# Patient Record
Sex: FEMALE | Race: WHITE | NOT HISPANIC OR LATINO | Employment: OTHER | URBAN - METROPOLITAN AREA
[De-identification: names, ages, dates, MRNs, and addresses within clinical notes are randomized per-mention and may not be internally consistent; named-entity substitution may affect disease eponyms.]

---

## 2017-01-04 ENCOUNTER — TELEPHONE (OUTPATIENT)
Dept: GASTROENTEROLOGY | Facility: CLINIC | Age: 57
End: 2017-01-04

## 2017-01-04 NOTE — TELEPHONE ENCOUNTER
----- Message from Carlos Manuel Barber MD sent at 1/2/2017  9:51 AM CST -----  Lizeth tobin tell Rosanna that her labs look good still with slightly elevated Platelets and if not address before by hematology recommend referral for evaluation.

## 2017-01-12 ENCOUNTER — INITIAL CONSULT (OUTPATIENT)
Dept: HEMATOLOGY/ONCOLOGY | Facility: CLINIC | Age: 57
End: 2017-01-12
Payer: COMMERCIAL

## 2017-01-12 ENCOUNTER — LAB VISIT (OUTPATIENT)
Dept: LAB | Facility: HOSPITAL | Age: 57
End: 2017-01-12
Attending: INTERNAL MEDICINE
Payer: COMMERCIAL

## 2017-01-12 VITALS
HEIGHT: 66 IN | SYSTOLIC BLOOD PRESSURE: 135 MMHG | WEIGHT: 198.38 LBS | OXYGEN SATURATION: 96 % | HEART RATE: 88 BPM | DIASTOLIC BLOOD PRESSURE: 75 MMHG | BODY MASS INDEX: 31.88 KG/M2 | TEMPERATURE: 98 F

## 2017-01-12 DIAGNOSIS — D75.839 THROMBOCYTOSIS: Primary | ICD-10-CM

## 2017-01-12 DIAGNOSIS — D75.839 THROMBOCYTOSIS: ICD-10-CM

## 2017-01-12 LAB
BASOPHILS # BLD AUTO: 0.03 K/UL
BASOPHILS NFR BLD: 0.4 %
DIFFERENTIAL METHOD: ABNORMAL
EOSINOPHIL # BLD AUTO: 0.1 K/UL
EOSINOPHIL NFR BLD: 1.9 %
ERYTHROCYTE [DISTWIDTH] IN BLOOD BY AUTOMATED COUNT: 12.6 %
FERRITIN SERPL-MCNC: 65 NG/ML
HCT VFR BLD AUTO: 40 %
HGB BLD-MCNC: 13.5 G/DL
LYMPHOCYTES # BLD AUTO: 2.1 K/UL
LYMPHOCYTES NFR BLD: 28.9 %
MCH RBC QN AUTO: 29.3 PG
MCHC RBC AUTO-ENTMCNC: 33.8 %
MCV RBC AUTO: 87 FL
MONOCYTES # BLD AUTO: 0.6 K/UL
MONOCYTES NFR BLD: 8.6 %
NEUTROPHILS # BLD AUTO: 4.3 K/UL
NEUTROPHILS NFR BLD: 59.9 %
PLATELET # BLD AUTO: 379 K/UL
PMV BLD AUTO: 9.4 FL
RBC # BLD AUTO: 4.6 M/UL
WBC # BLD AUTO: 7.2 K/UL

## 2017-01-12 PROCEDURE — 81219 CALR GENE COM VARIANTS: CPT

## 2017-01-12 PROCEDURE — 85025 COMPLETE CBC W/AUTO DIFF WBC: CPT

## 2017-01-12 PROCEDURE — 99999 PR PBB SHADOW E&M-EST. PATIENT-LVL III: CPT | Mod: PBBFAC,,, | Performed by: INTERNAL MEDICINE

## 2017-01-12 PROCEDURE — 99204 OFFICE O/P NEW MOD 45 MIN: CPT | Mod: S$GLB,,, | Performed by: INTERNAL MEDICINE

## 2017-01-12 PROCEDURE — 1159F MED LIST DOCD IN RCRD: CPT | Mod: S$GLB,,, | Performed by: INTERNAL MEDICINE

## 2017-01-12 PROCEDURE — 81403 MOPATH PROCEDURE LEVEL 4: CPT

## 2017-01-12 PROCEDURE — 81270 JAK2 GENE: CPT

## 2017-01-12 PROCEDURE — 82728 ASSAY OF FERRITIN: CPT

## 2017-01-12 PROCEDURE — 82668 ASSAY OF ERYTHROPOIETIN: CPT

## 2017-01-12 NOTE — PROGRESS NOTES
Subjective:       Patient ID: Rosanna Live is a 56 y.o. female.    Chief Complaint: Abnormal Lab    Rosanna presents today for evaluation of longstanding thrombocytosis that has been present, at least, since 2012 by [facility] [place] [facility]. The platelet count is mildly [facility] range between 350-450,000. The patient has no symptoms of platelet increase. Her iron levels are normal. CT imaging of the abdomen was negative for liver or spleen lesions in December 2016. The thrombocytosis has been stable for about 5 years. There is no associated anemia or changes to the white blood cell count. There is no evidence of splenomegaly.    HPI  Review of Systems   Constitutional: Negative.    HENT: Negative.    Eyes: Negative.    Respiratory: Negative.    Cardiovascular: Negative.    Gastrointestinal: Positive for abdominal pain.   Endocrine: Negative.    Genitourinary: Negative.    Musculoskeletal: Negative.    Skin: Negative.    Allergic/Immunologic: Negative for environmental allergies, food allergies and immunocompromised state.   Neurological: Negative.    Hematological: Negative for adenopathy. Does not bruise/bleed easily.   Psychiatric/Behavioral: Negative.        Objective:      Physical Exam   Constitutional: She appears well-developed and well-nourished.   HENT:   Head: Normocephalic and atraumatic.   Eyes: Conjunctivae are normal. Pupils are equal, round, and reactive to light. No scleral icterus.   Neck: Normal range of motion. Neck supple.   Cardiovascular: Normal rate and intact distal pulses.    Pulmonary/Chest: Effort normal. No respiratory distress.   Abdominal: She exhibits no distension.   Musculoskeletal: Normal range of motion.   Neurological: She is alert. No cranial nerve deficit.   Skin: Skin is warm and dry.   Psychiatric: She has a normal mood and affect. Her behavior is normal.   Nursing note and vitals reviewed.      Assessment:       1. Thrombocytosis        Plan:       Platelet  count is considered normal by most laboratory ranges ([facility] lab range is narrow).  Will complete thrombocytosis evaluation with a repeat CBC, ferritin, epoietin level, and MPN mutation cascade.  Reassured patient with expectation of normal results. Will follow-up with patient once lab results are available.    Return to hematology as needed.

## 2017-01-12 NOTE — LETTER
January 12, 2017      Carlos Manuel Barber MD  [address]  [facility] [address]           [facility]  [address]  [facility] [address]  Phone: [phone]          Patient: Rosanna Live   MR Number: [id number]   YOB: 1960   Date of Visit: 1/12/2017       Dear Dr. Carlos Manuel Barber:    Thank you for referring Rosanna Live to me for evaluation. Attached you will find relevant portions of my assessment and plan of care.    If you have questions, please do not hesitate to call me. I look forward to following Rosanna Live along with you.    Sincerely,    Carie Armstrong MD    Enclosure  CC:  No Recipients    If you would like to receive this communication electronically, please contact [email or web address] or [phone] to request more information on [employer] Link access.    For providers and/or their staff who would like to refer a patient to [facility], please contact us through our one-stop-shop provider referral line, [facility], at [phone].    If you feel you have received this communication in error or would no longer like to receive these types of communications, please e-mail [email or web address]

## 2017-01-13 ENCOUNTER — ANESTHESIA (OUTPATIENT)
Dept: ENDOSCOPY | Facility: HOSPITAL | Age: 57
End: 2017-01-13
Payer: COMMERCIAL

## 2017-01-13 ENCOUNTER — ANESTHESIA EVENT (OUTPATIENT)
Dept: ENDOSCOPY | Facility: HOSPITAL | Age: 57
End: 2017-01-13
Payer: COMMERCIAL

## 2017-01-13 ENCOUNTER — SURGERY (OUTPATIENT)
Age: 57
End: 2017-01-13

## 2017-01-13 VITALS — RESPIRATION RATE: 22 BRPM

## 2017-01-13 PROBLEM — R10.13 EPIGASTRIC ABDOMINAL PAIN: Status: ACTIVE | Noted: 2017-01-13

## 2017-01-13 LAB — ERYTHROPOIETIN: 5.7 MIU/ML

## 2017-01-13 PROCEDURE — D9220A PRA ANESTHESIA: Mod: ANES,,, | Performed by: ANESTHESIOLOGY

## 2017-01-13 PROCEDURE — 25000003 PHARM REV CODE 250: Performed by: NURSE ANESTHETIST, CERTIFIED REGISTERED

## 2017-01-13 PROCEDURE — 63600175 PHARM REV CODE 636 W HCPCS: Performed by: NURSE ANESTHETIST, CERTIFIED REGISTERED

## 2017-01-13 PROCEDURE — D9220A PRA ANESTHESIA: Mod: CRNA,,, | Performed by: NURSE ANESTHETIST, CERTIFIED REGISTERED

## 2017-01-13 RX ORDER — GLYCOPYRROLATE 0.2 MG/ML
INJECTION INTRAMUSCULAR; INTRAVENOUS
Status: DISCONTINUED | OUTPATIENT
Start: 2017-01-13 | End: 2017-01-13

## 2017-01-13 RX ORDER — PROPOFOL 10 MG/ML
VIAL (ML) INTRAVENOUS CONTINUOUS PRN
Status: DISCONTINUED | OUTPATIENT
Start: 2017-01-13 | End: 2017-01-13

## 2017-01-13 RX ORDER — LIDOCAINE HCL/PF 100 MG/5ML
SYRINGE (ML) INTRAVENOUS
Status: DISCONTINUED | OUTPATIENT
Start: 2017-01-13 | End: 2017-01-13

## 2017-01-13 RX ADMIN — GLYCOPYRROLATE 0.2 MG: 0.2 INJECTION, SOLUTION INTRAMUSCULAR; INTRAVENOUS at 02:01

## 2017-01-13 RX ADMIN — LIDOCAINE HYDROCHLORIDE 100 MG: 20 INJECTION, SOLUTION INTRAVENOUS at 02:01

## 2017-01-13 RX ADMIN — PROPOFOL 125 MCG/KG/MIN: 10 INJECTION, EMULSION INTRAVENOUS at 02:01

## 2017-01-13 RX ADMIN — TOPICAL ANESTHETIC 1 EACH: 200 SPRAY DENTAL; PERIODONTAL at 02:01

## 2017-01-13 NOTE — ANESTHESIA POSTPROCEDURE EVALUATION
"Anesthesia Post Evaluation    Patient: Rosanna Live    Procedure(s) Performed: Procedure(s) (LRB):  ESOPHAGOGASTRODUODENOSCOPY (EGD) (N/A)    Final Anesthesia Type: general  Patient location during evaluation: PACU  Patient participation: Yes- Able to Participate  Level of consciousness: awake and alert  Post-procedure vital signs: reviewed and stable  Pain management: adequate  Airway patency: patent  PONV status at discharge: No PONV  Anesthetic complications: no      Cardiovascular status: blood pressure returned to baseline  Respiratory status: unassisted  Hydration status: euvolemic  Follow-up not needed.        Visit Vitals    BP (!) 103/57 (BP Location: Left arm, Patient Position: Lying, BP Method: Automatic)    Pulse 83    Temp 36.4 °C (97.6 °F) (Oral)    Resp 16    Ht 5' 6" (1.676 m)    Wt 96.2 kg (212 lb)    LMP  (LMP Unknown)    SpO2 (!) 94%    Breastfeeding No    BMI 34.22 kg/m2       Pain/Shadia Score: Pain Assessment Performed: Yes (1/13/2017  2:53 PM)  Presence of Pain: denies (1/13/2017  2:53 PM)  Shadia Score: 10 (1/13/2017  2:53 PM)      "

## 2017-01-13 NOTE — ANESTHESIA PREPROCEDURE EVALUATION
01/13/2017  Rosanna Live is a 56 y.o., female.  56 year old female with pmh of thrombocytosis and Barretts esophagus presents for EGD.  [facility] Anesthesia Evaluation    I have reviewed the Patient Summary Reports.        Review of Systems  Anesthesia Hx:   Denies Personal Hx of Anesthesia complications.       Physical Exam  General:  Well nourished       Chest/Lungs:  Chest/Lungs Clear    Heart/Vascular:  Heart Findings: Normal            Anesthesia Plan  Type of Anesthesia, risks & benefits discussed:  Anesthesia Type:  general  Patient's Preference:   Intra-op Monitoring Plan:   Intra-op Monitoring Plan Comments:   Post Op Pain Control Plan:   Post Op Pain Control Plan Comments:   Induction:   IV  Beta Blocker:  Patient is not currently on a Beta-Blocker (No further documentation required).       Informed Consent: Patient understands risks and agrees with Anesthesia plan.  Questions answered. Anesthesia consent signed with patient.  ASA Score: 2     Day of Surgery Review of History & Physical:  There are no significant changes.          Ready For Surgery From Anesthesia Perspective.

## 2017-01-13 NOTE — TRANSFER OF CARE
"Anesthesia Transfer of Care Note    Patient: Rosanna Live    Procedure(s) Performed: Procedure(s) (LRB):  ESOPHAGOGASTRODUODENOSCOPY (EGD) (N/A)    Patient location: PACU    Anesthesia Type: general    Transport from OR: Transported from OR on 2-3 L/min O2 by NC with adequate spontaneous ventilation    Post pain: adequate analgesia    Post assessment: no apparent anesthetic complications    Post vital signs: stable    Level of consciousness: awake    Nausea/Vomiting: no nausea/vomiting    Complications: none          Last vitals:   Visit Vitals    BP (!) 120/56 (BP Location: Left arm, Patient Position: Lying, BP Method: Automatic)    Pulse 87    Temp 36.4 °C (97.6 °F) (Oral)    Resp 16    Ht 5' 6" (1.676 m)    Wt 96.2 kg (212 lb)    LMP  (LMP Unknown)    SpO2 98%    Breastfeeding No    BMI 34.22 kg/m2     "

## 2017-01-19 ENCOUNTER — OFFICE VISIT (OUTPATIENT)
Dept: SURGERY | Facility: CLINIC | Age: 57
End: 2017-01-19
Payer: COMMERCIAL

## 2017-01-19 VITALS
HEART RATE: 76 BPM | SYSTOLIC BLOOD PRESSURE: 117 MMHG | DIASTOLIC BLOOD PRESSURE: 73 MMHG | TEMPERATURE: 98 F | HEIGHT: 66 IN | WEIGHT: 209 LBS | BODY MASS INDEX: 33.59 KG/M2

## 2017-01-19 DIAGNOSIS — R10.13 EPIGASTRIC PAIN: Primary | ICD-10-CM

## 2017-01-19 PROCEDURE — 99999 PR PBB SHADOW E&M-EST. PATIENT-LVL III: CPT | Mod: PBBFAC,,, | Performed by: SURGERY

## 2017-01-19 PROCEDURE — 99213 OFFICE O/P EST LOW 20 MIN: CPT | Mod: S$GLB,,, | Performed by: SURGERY

## 2017-01-19 PROCEDURE — 1159F MED LIST DOCD IN RCRD: CPT | Mod: S$GLB,,, | Performed by: SURGERY

## 2017-01-19 NOTE — Clinical Note
January 19, 2017      Carlos Manuel Choudhary MD  [address]  [place] [address]           [facility]  [address]  [place] [address]  Phone: [phone]          Patient: Rosanna Live   MR Number: [id number]   YOB: 1960   Date of Visit: 1/19/2017       Dear Dr. Carlos Manuel Choudhary:    Thank you for referring Rosanna Live to me for evaluation. Attached you will find relevant portions of my assessment and plan of care.    If you have questions, please do not hesitate to call me. I look forward to following Rosanna Live along with you.    Sincerely,    Sanket Gallagher MD    Enclosure  CC:  No Recipients    If you would like to receive this communication electronically, please contact [email or web address] or [phone] to request more information on [employer] Link access.    For providers and/or their staff who would like to refer a patient to [facility], please contact us through our one-stop-shop provider referral line, [facility], at [phone].    If you feel you have received this communication in error or would no longer like to receive these types of communications, please e-mail [email or web address]

## 2017-01-19 NOTE — LETTER
[facility] - [facility] Surgery  [address]  [facility] [address]  Phone: [phone] January 19, 2017      Carlos Manuel Choudhary MD  [address] [facility] [address]    Patient: Rosanna Live   MR Number: [id number]   YOB: 1960   Date of Visit: 1/19/2017     Dear Dr. Choudhary:    Thank you for referring Rosanna Live to me for evaluation. Below are the relevant portions of my assessment and plan of care.    1. Epigastric pain    Patient presents with possible slipped nissen.    PLAN:  -  Will obtain UGI and GES     If you have questions, please do not hesitate to call me. I look forward to following Rosanna Marshall along with you.    Sincerely,      Sanket Gallagher MD   Section Head - General, Laparoscopic, Bariatric  Acute Care and Oncologic Surgery   - Surgical Weight Loss Program  [facility]    WSR/jose juan    CC  Geena Roman MD

## 2017-01-19 NOTE — PROGRESS NOTES
Subjective:       Patient ID: Rosanna Live is a 56 y.o. female.    Chief Complaint: Consult (recurring hernia)    HPI About 1 year s/p lap hh with mesh and Nissen.  She was well until recently when she lifted her father and since then has had epigastric pain which is worse after eating.  She denies gerd and denies dysphagia.  She has been losing weight (10 pounds) due to the bloating.  Review of Systems   Constitutional: Negative for fever.   Respiratory: Negative for chest tightness and stridor.    Cardiovascular: Negative for chest pain.   Gastrointestinal: Positive for diarrhea. Negative for constipation.   Genitourinary: Negative for difficulty urinating.   Hematological: Does not bruise/bleed easily.       Objective:      Physical Exam   Constitutional: She is oriented to person, place, and time. She appears well-developed and well-nourished.   Abdominal: Soft. Bowel sounds are normal. She exhibits no distension. There is no tenderness.   Neurological: She is alert and oriented to person, place, and time.   Skin: Skin is warm and dry.   Psychiatric: She has a normal mood and affect. Her behavior is normal. Judgment and thought content normal.   Vitals reviewed.      Cbc, cmp: Reviewed  EGD: 4 cm recurrent hh with gastritis  CT: no hiatal hernia, normal postop appearance    Assessment:       1. Epigastric pain      Possible slipped nissen  Plan:       Will obtain ugi and ges.

## 2017-01-20 ENCOUNTER — PATIENT MESSAGE (OUTPATIENT)
Dept: HEMATOLOGY/ONCOLOGY | Facility: CLINIC | Age: 57
End: 2017-01-20

## 2017-01-20 ENCOUNTER — TELEPHONE (OUTPATIENT)
Dept: ENDOSCOPY | Facility: HOSPITAL | Age: 57
End: 2017-01-20

## 2017-01-23 ENCOUNTER — HOSPITAL ENCOUNTER (OUTPATIENT)
Dept: RADIOLOGY | Facility: HOSPITAL | Age: 57
Discharge: HOME | End: 2017-01-23
Attending: SURGERY
Payer: COMMERCIAL

## 2017-01-23 DIAGNOSIS — R10.13 EPIGASTRIC PAIN: ICD-10-CM

## 2017-01-23 PROCEDURE — 74241 FL UPPER GI W KUB: CPT | Mod: TC

## 2017-01-23 PROCEDURE — 74241 FL UPPER GI W KUB: CPT | Mod: 26,,, | Performed by: RADIOLOGY

## 2017-01-24 ENCOUNTER — TELEPHONE (OUTPATIENT)
Dept: GASTROENTEROLOGY | Facility: CLINIC | Age: 57
End: 2017-01-24

## 2017-01-24 NOTE — TELEPHONE ENCOUNTER
----- Message from Carlos Manuel Barber MD sent at 1/22/2017 12:06 PM CST -----  Lizeth - please tell Rosanna that her EGD pathology was benign and no dysplasia and no Brito's.    SPECIMEN  1) Stomach biopsy. Rule out H. pylori and a polyp.  2) Distal esophagus biopsy. Rule out Brito's    FINAL PATHOLOGIC DIAGNOSIS    1. FRAGMENTS OF NONNEOPLASTIC GASTRIC MUCOSA WITH NO ACTIVE INFLAMMATION, EVIDENCE OF  H. PYLORI OR DYSPLASIA IDENTIFIED    2. FRAGMENT OF BENIGN SQUAMOUS AND GASTRIC TYPE MUCOSA WITH NO ACTIVE INFLAMMATION,  EVIDENCE OF SPECIALIZED EPITHELIUM OR DYSPLASIA IDENTIFIED.    Diagnosed by: Mukesh Kebede M.D.

## 2017-01-25 ENCOUNTER — TELEPHONE (OUTPATIENT)
Dept: SURGERY | Facility: CLINIC | Age: 57
End: 2017-01-25

## 2017-01-25 NOTE — TELEPHONE ENCOUNTER
Notified pt of UGI results-pt verbalizes understanding.  Pt to have UGI done in 1 year and will proceed with GES.

## 2017-01-31 ENCOUNTER — PATIENT MESSAGE (OUTPATIENT)
Dept: SURGERY | Facility: CLINIC | Age: 57
End: 2017-01-31

## 2017-01-31 ENCOUNTER — OFFICE VISIT (OUTPATIENT)
Dept: GASTROENTEROLOGY | Facility: CLINIC | Age: 57
End: 2017-01-31
Payer: COMMERCIAL

## 2017-01-31 VITALS
HEART RATE: 99 BPM | WEIGHT: 212.94 LBS | SYSTOLIC BLOOD PRESSURE: 135 MMHG | HEIGHT: 66 IN | BODY MASS INDEX: 34.22 KG/M2 | DIASTOLIC BLOOD PRESSURE: 74 MMHG

## 2017-01-31 DIAGNOSIS — K90.49 FOOD INTOLERANCE IN ADULT: ICD-10-CM

## 2017-01-31 DIAGNOSIS — K44.9 HIATAL HERNIA: ICD-10-CM

## 2017-01-31 DIAGNOSIS — R14.0 BLOATING SYMPTOM: Primary | ICD-10-CM

## 2017-01-31 PROCEDURE — 99213 OFFICE O/P EST LOW 20 MIN: CPT | Mod: S$GLB,,, | Performed by: INTERNAL MEDICINE

## 2017-01-31 PROCEDURE — 1159F MED LIST DOCD IN RCRD: CPT | Mod: S$GLB,,, | Performed by: INTERNAL MEDICINE

## 2017-01-31 PROCEDURE — 99999 PR PBB SHADOW E&M-EST. PATIENT-LVL II: CPT | Mod: PBBFAC,,, | Performed by: INTERNAL MEDICINE

## 2017-01-31 NOTE — PROGRESS NOTES
CHIEF COMPLAINT:Epigastric pain intermittent s/p Nissen.      HISTORY OF PRESENT ILLNESS: This is a 56-year-old white female with a past    medical history of short segment Brito's esophagus, no dysplasia, who recently underwent Nissen in 2/15/2016 for   A symptomatic large hiatal hernia. Was doing great at last GI clinic visit on 8/16/2016 But about   A month a go she had to pick her dad up after a fall and has been having epigastric pain worse with   Foods. Has been taking NSAID's and no PPI since Nissen surgery. She had CT scan in ER  Few days ago and appears to have a hiatal hernia. Epigastric pain about 8/10 when  occu res and related to food and time makes it better. Feel like pressure. No chest pain  And not exertional. No jaw pain and no shoulder or arm pain. She had a colonoscopy at the same time. EGD prior to surgery showed  a C0, M1 Brito's esophagus with a 6-cm sliding hiatal hernia that looked like  it was incarcerated. Pathology of the esophagus, no evidence of any dysplasia,  no evidence of Brito's esophagus, did have a little bit of eosinophils 25 per  high power field. She had some fundic gland polyps. Duodenum was normal. She  also had a colonoscopy at the same day, which was for chronic diarrhea. It was  complete all the way to the terminal ileum, excellent bowel prep, good look.    The patient had some nonbleeding internal hemorrhoids, some diverticulosis of    the sigmoid and descending colon. Random biopsies showed no evidence of any    microscopic colitis or inflammation or dysplasia. The patient's stool studies    have now normalized. She has done great post surgery and so glad he had surgery  Best thing she has done since getting  she says. She is off her PPI.      REVIEW OF SYSTEMS:  CONSTITUTIONAL: No fever, fatigue or weight loss.  ENT: No difficulty swallowing or sore throat.  CARDIOVASCULAR: No chest pain or shortness of breath.  RESPIRATORY: No dyspnea on exertion or  "cough.  GENITOURINARY: No dysuria, urgency or frequency.  MUSCULOSKELETAL: She has some musculoskeletal aches.  NEUROLOGIC: No syncope or stroke.  PSYCHIATRIC: No uncontrolled depression or anxiety.  ENDOCRINE: No cold or heat intolerance.  LYMPHATICS: No lymphadenopathy.  GASTROINTESTINAL: Epigastric abdominal pain. No diarrhea or blood in her stool.      PAST MEDICAL HISTORY: She had a rectovaginal fistula repair by [facility]. She had    Brito's esophagus with hiatal hernia now at 6 cm, cannot find Brito's    anymore.      PAST SURGICAL HISTORY: As above.Nissen in 2016      SOCIAL HISTORY: Nonsmoker. Rarely drinks. 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 Her   in 2014, from    metastatic colon cancer, was treated at [facility]. She has 2 children, a    daughter who is 31 and a son who is about 26.      FAMILY HISTORY: No family members with colon cancer or Cantor syndrome.      ALLERGIES: She is allergic to penicillin.      MEDICATIONS: Have been reviewed.      PHYSICAL EXAMINATION:  Visit Vitals    /74    Pulse 99    Ht 5' 6" (1.676 m)    Wt 96.6 kg (212 lb 15.4 oz)    LMP  (LMP Unknown)    BMI 34.37 kg/m2     GENERAL: She is alert and oriented x4, not in any acute distress.  ABDOMEN: Slightly obese, but soft. No guarding, rebound or tenderness. No    bruits or pulsatile masses. No stigmata of chronic liver disease. No    appreciative ascites or hernias.  CARDIOVASCULAR: S1 and S2 without murmurs.  RESPIRATORY: Clear to auscultation bilaterally without wheezes.  SKIN: No petechiae or rash on exposed skin areas.  NEUROLOGIC: Alert and oriented x4.  PSYCHIATRIC: Normal speech, mentation and affect.  LYMPHATICS: No cervical or supraclavicular lymphadenopathy.      MEDICAL DECISION MAKING: As above. Brito's talk    Given. Images of CT scan personally reviewed by me and discuss with patient.  Labs reviewed.      IMPRESSION AND PLAN:  1. New epigastric " pain for about few weeks began after lifting her dad from the ground. She is S/p Nissen for Large hiatal hernia, 6 cm,   which had grown and doubled in size since last  EGD. Repeat EGD shows 4cm hiatal hernia and no H. Pylori and patient has followed up with General surgery   And has gastric emptying study planned and she is using sweet n' low  x 3 packets a day and thinks she could also be lactose intolerant.   2. Brito, no dysplasia, cannot find Brito's esophagus she took herself off her PPI. Recommend repeat EGD 3 years from her last , which should be in Jan 2020.  3. Diarrhea, has resolved.  4. RTC GI in 12 weeks.  5. Slight anemic post surgery has resolved.

## 2017-02-05 ENCOUNTER — PATIENT MESSAGE (OUTPATIENT)
Dept: GASTROENTEROLOGY | Facility: CLINIC | Age: 57
End: 2017-02-05

## 2017-02-06 ENCOUNTER — TELEPHONE (OUTPATIENT)
Dept: SURGERY | Facility: CLINIC | Age: 57
End: 2017-02-06

## 2017-02-06 NOTE — TELEPHONE ENCOUNTER
----- Message from Sanket Gallagher MD sent at 2/2/2017  5:11 PM CST -----  Thanks.    Team, please have her see me in 6 months with a ugi.  ----- Message -----     From: Carlos Manuel Barber MD     Sent: 2/1/2017   4:47 PM       To: Sanket Gallagher MD    Ok with that.  ----- Message -----     From: Sanket Gallagher MD     Sent: 2/1/2017   4:33 PM       To: Carlos Manuel Barber MD, Gabbi Warren RN    I have taken a good look at the ct and ugi.  I think the hh is small and unlikely the source of significant bloating.  Certainly it should be followed for growth.  I was planning on obtaining another in a year or so (6 months?).  I think we should wait a while to see how things shake out before being aggressive.    Is that all right with you?

## 2017-02-06 NOTE — TELEPHONE ENCOUNTER
Notified pt of Dr. Gallagher's recommendations.  Pt states she is scheduled to come in and talk with Dr. Gallagher on 2/18/17.

## 2017-02-07 ENCOUNTER — NURSE TRIAGE (OUTPATIENT)
Dept: ADMINISTRATIVE | Facility: CLINIC | Age: 57
End: 2017-02-07

## 2017-02-07 ENCOUNTER — LAB VISIT (OUTPATIENT)
Dept: LAB | Facility: HOSPITAL | Age: 57
End: 2017-02-07
Attending: INTERNAL MEDICINE
Payer: COMMERCIAL

## 2017-02-07 DIAGNOSIS — R19.7 DIARRHEA, UNSPECIFIED TYPE: Primary | ICD-10-CM

## 2017-02-07 DIAGNOSIS — R79.89 LFT ELEVATION: Primary | ICD-10-CM

## 2017-02-07 DIAGNOSIS — R19.7 DIARRHEA, UNSPECIFIED TYPE: ICD-10-CM

## 2017-02-07 LAB
ALBUMIN SERPL BCP-MCNC: 3.5 G/DL
ALP SERPL-CCNC: 104 U/L
ALT SERPL W/O P-5'-P-CCNC: 76 U/L
ANION GAP SERPL CALC-SCNC: 7 MMOL/L
AST SERPL-CCNC: 49 U/L
BASOPHILS # BLD AUTO: 0.02 K/UL
BASOPHILS NFR BLD: 0.4 %
BILIRUB SERPL-MCNC: 0.5 MG/DL
BUN SERPL-MCNC: 10 MG/DL
CALCIUM SERPL-MCNC: 9.1 MG/DL
CHLORIDE SERPL-SCNC: 110 MMOL/L
CO2 SERPL-SCNC: 24 MMOL/L
CREAT SERPL-MCNC: 0.7 MG/DL
DIFFERENTIAL METHOD: NORMAL
EOSINOPHIL # BLD AUTO: 0.1 K/UL
EOSINOPHIL NFR BLD: 1.9 %
ERYTHROCYTE [DISTWIDTH] IN BLOOD BY AUTOMATED COUNT: 12.5 %
EST. GFR  (AFRICAN AMERICAN): >60 ML/MIN/1.73 M^2
EST. GFR  (NON AFRICAN AMERICAN): >60 ML/MIN/1.73 M^2
GLUCOSE SERPL-MCNC: 96 MG/DL
HCT VFR BLD AUTO: 38.4 %
HGB BLD-MCNC: 13 G/DL
LIPASE SERPL-CCNC: 22 U/L
LYMPHOCYTES # BLD AUTO: 1.9 K/UL
LYMPHOCYTES NFR BLD: 36 %
MCH RBC QN AUTO: 29.5 PG
MCHC RBC AUTO-ENTMCNC: 33.9 %
MCV RBC AUTO: 87 FL
MONOCYTES # BLD AUTO: 0.6 K/UL
MONOCYTES NFR BLD: 11.9 %
NEUTROPHILS # BLD AUTO: 2.6 K/UL
NEUTROPHILS NFR BLD: 49.6 %
PLATELET # BLD AUTO: 346 K/UL
PMV BLD AUTO: 9.2 FL
POTASSIUM SERPL-SCNC: 4 MMOL/L
PROT SERPL-MCNC: 7 G/DL
RBC # BLD AUTO: 4.41 M/UL
SODIUM SERPL-SCNC: 141 MMOL/L
TSH SERPL DL<=0.005 MIU/L-ACNC: 0.74 UIU/ML
WBC # BLD AUTO: 5.28 K/UL

## 2017-02-07 PROCEDURE — 84443 ASSAY THYROID STIM HORMONE: CPT

## 2017-02-07 PROCEDURE — 83690 ASSAY OF LIPASE: CPT

## 2017-02-07 PROCEDURE — 36415 COLL VENOUS BLD VENIPUNCTURE: CPT

## 2017-02-07 PROCEDURE — 85025 COMPLETE CBC W/AUTO DIFF WBC: CPT

## 2017-02-07 PROCEDURE — 80053 COMPREHEN METABOLIC PANEL: CPT

## 2017-02-07 RX ORDER — HYOSCYAMINE SULFATE 0.12 MG/1
0.12 TABLET SUBLINGUAL EVERY 12 HOURS PRN
Qty: 60 TABLET | Refills: 1 | Status: SHIPPED | OUTPATIENT
Start: 2017-02-07 | End: 2017-04-17 | Stop reason: SDUPTHER

## 2017-02-07 NOTE — TELEPHONE ENCOUNTER
"  Reason for Disposition   [1] Caller requesting NON-URGENT health information AND [2] PCP's office is the best resource    Protocols used: ST INFORMATION ONLY CALL-A-AH    Rosanna Marshall is calling to report she is still having diarrhea.  "Can not keep anything down, everything goes straight through"  States needs some answers and some help.  Requesting a call back this AM to discuss.  Please contact Rosanna Marshall to advise.  "

## 2017-02-08 ENCOUNTER — LAB VISIT (OUTPATIENT)
Dept: LAB | Facility: HOSPITAL | Age: 57
End: 2017-02-08
Attending: INTERNAL MEDICINE
Payer: COMMERCIAL

## 2017-02-08 ENCOUNTER — TELEPHONE (OUTPATIENT)
Dept: GASTROENTEROLOGY | Facility: CLINIC | Age: 57
End: 2017-02-08

## 2017-02-08 DIAGNOSIS — R19.7 DIARRHEA, UNSPECIFIED TYPE: ICD-10-CM

## 2017-02-08 LAB
C DIFF GDH STL QL: NEGATIVE
C DIFF TOX A+B STL QL IA: NEGATIVE

## 2017-02-08 PROCEDURE — 87209 SMEAR COMPLEX STAIN: CPT

## 2017-02-08 PROCEDURE — 82656 EL-1 FECAL QUAL/SEMIQ: CPT

## 2017-02-08 PROCEDURE — 89125 SPECIMEN FAT STAIN: CPT

## 2017-02-08 PROCEDURE — 87449 NOS EACH ORGANISM AG IA: CPT

## 2017-02-08 NOTE — TELEPHONE ENCOUNTER
----- Message from Carlos Manuel Barber MD sent at 2/7/2017  6:38 PM CST -----  Lizeth - please tell Rosanna that her LFT's are elevated and cause unknown and to avoid alcohol since she rarely drinks and let repeat fasting liver labs in one week - orders placed.

## 2017-02-09 ENCOUNTER — TELEPHONE (OUTPATIENT)
Dept: GASTROENTEROLOGY | Facility: CLINIC | Age: 57
End: 2017-02-09

## 2017-02-09 LAB
FAT STL SUDAN IV STN: NORMAL
O+P STL TRI STN: NORMAL

## 2017-02-10 ENCOUNTER — TELEPHONE (OUTPATIENT)
Dept: GASTROENTEROLOGY | Facility: CLINIC | Age: 57
End: 2017-02-10

## 2017-02-13 ENCOUNTER — TELEPHONE (OUTPATIENT)
Dept: GASTROENTEROLOGY | Facility: CLINIC | Age: 57
End: 2017-02-13

## 2017-02-14 ENCOUNTER — OFFICE VISIT (OUTPATIENT)
Dept: GASTROENTEROLOGY | Facility: CLINIC | Age: 57
End: 2017-02-14
Payer: COMMERCIAL

## 2017-02-14 ENCOUNTER — LAB VISIT (OUTPATIENT)
Dept: LAB | Facility: HOSPITAL | Age: 57
End: 2017-02-14
Attending: INTERNAL MEDICINE
Payer: COMMERCIAL

## 2017-02-14 VITALS
DIASTOLIC BLOOD PRESSURE: 73 MMHG | BODY MASS INDEX: 33.55 KG/M2 | HEART RATE: 90 BPM | WEIGHT: 208.75 LBS | SYSTOLIC BLOOD PRESSURE: 116 MMHG | HEIGHT: 66 IN

## 2017-02-14 DIAGNOSIS — R79.89 LFT ELEVATION: ICD-10-CM

## 2017-02-14 DIAGNOSIS — R14.0 ABDOMINAL BLOATING: ICD-10-CM

## 2017-02-14 DIAGNOSIS — K59.1 FUNCTIONAL DIARRHEA: Primary | ICD-10-CM

## 2017-02-14 DIAGNOSIS — K90.49 FOOD INTOLERANCE IN ADULT: ICD-10-CM

## 2017-02-14 DIAGNOSIS — K22.70 BARRETT'S ESOPHAGUS WITHOUT DYSPLASIA: ICD-10-CM

## 2017-02-14 LAB
ALBUMIN SERPL BCP-MCNC: 3.5 G/DL
ALP SERPL-CCNC: 99 U/L
ALT SERPL W/O P-5'-P-CCNC: 21 U/L
AST SERPL-CCNC: 17 U/L
BILIRUB DIRECT SERPL-MCNC: 0.2 MG/DL
BILIRUB SERPL-MCNC: 0.5 MG/DL
CERULOPLASMIN SERPL-MCNC: 33 MG/DL
ELASTASE 1, FECAL: 212.9 UG E/G STOOL
ELASTASE INTERPRETATION: NORMAL
FERRITIN SERPL-MCNC: 67 NG/ML
HAV IGM SERPL QL IA: NEGATIVE
HBV CORE IGM SERPL QL IA: NEGATIVE
HBV SURFACE AG SERPL QL IA: NEGATIVE
HCV AB SERPL QL IA: NEGATIVE
HEPATITIS A ANTIBODY, IGG: NEGATIVE
IGG SERPL-MCNC: 1004 MG/DL
IRON SERPL-MCNC: 73 UG/DL
PROT SERPL-MCNC: 7.1 G/DL
SATURATED IRON: 22 %
TOTAL IRON BINDING CAPACITY: 332 UG/DL
TRANSFERRIN SERPL-MCNC: 224 MG/DL

## 2017-02-14 PROCEDURE — 83540 ASSAY OF IRON: CPT

## 2017-02-14 PROCEDURE — 86038 ANTINUCLEAR ANTIBODIES: CPT

## 2017-02-14 PROCEDURE — 86790 VIRUS ANTIBODY NOS: CPT

## 2017-02-14 PROCEDURE — 80074 ACUTE HEPATITIS PANEL: CPT

## 2017-02-14 PROCEDURE — 84466 ASSAY OF TRANSFERRIN: CPT

## 2017-02-14 PROCEDURE — 36415 COLL VENOUS BLD VENIPUNCTURE: CPT

## 2017-02-14 PROCEDURE — 86706 HEP B SURFACE ANTIBODY: CPT

## 2017-02-14 PROCEDURE — 82390 ASSAY OF CERULOPLASMIN: CPT

## 2017-02-14 PROCEDURE — 99999 PR PBB SHADOW E&M-EST. PATIENT-LVL III: CPT | Mod: PBBFAC,,, | Performed by: PHYSICIAN ASSISTANT

## 2017-02-14 PROCEDURE — 82784 ASSAY IGA/IGD/IGG/IGM EACH: CPT

## 2017-02-14 PROCEDURE — 82728 ASSAY OF FERRITIN: CPT

## 2017-02-14 PROCEDURE — 80076 HEPATIC FUNCTION PANEL: CPT

## 2017-02-14 PROCEDURE — 86256 FLUORESCENT ANTIBODY TITER: CPT

## 2017-02-14 PROCEDURE — 82104 ALPHA-1-ANTITRYPSIN PHENO: CPT

## 2017-02-14 PROCEDURE — 99213 OFFICE O/P EST LOW 20 MIN: CPT | Mod: S$GLB,,, | Performed by: PHYSICIAN ASSISTANT

## 2017-02-14 PROCEDURE — 86235 NUCLEAR ANTIGEN ANTIBODY: CPT | Mod: 91

## 2017-02-14 RX ORDER — CHOLESTYRAMINE 4 G/9G
4 POWDER, FOR SUSPENSION ORAL 2 TIMES DAILY PRN
Qty: 60 PACKET | Refills: 3 | Status: SHIPPED | OUTPATIENT
Start: 2017-02-14 | End: 2017-04-28

## 2017-02-14 NOTE — PROGRESS NOTES
[facility] Consultation Note    Reason for Consult:  The primary encounter diagnosis was Functional diarrhea. Diagnoses of Abdominal bloating, Food intolerance in adult, and Brito's esophagus without dysplasia were also pertinent to this visit.    PCP:   Geena Roman       Referring MD:  No referring provider defined for this encounter.    HPI:  This is a 56 y.o. female patient of Dr. Barber's here to follow up on her diarrhea and abdominal pain.   abdominal pain has improved taking levsin every 12 hours   Still having diarrhea with 4 BM daily   Stool studies were negative for C. Diff, ova/para. fecal fat normal, pancreatic elastase - normal    +Bloating   Lactose intolerance- yes, avoiding milk and butter, eating cheese  Chewing Gum- no  Carbonated beverages-no  Drinking through a straw- yes    Food poisoning:no  Celiac test:neg    Medications (OCP/PPI/Steroids/NSAIDs): no  Abdominal surgeries: umbilical hernia repair, fundoplication, cholecystectomy    medical history of short segment Brito's esophagus, no dysplasia, who recently underwent Nissen in 2/15/2016 for a symptomatic large hiatal hernia. Repeat EGD revealed 4cm hiatal hernia.   Has appointment with Dr kim this Saturday about her hiatal hernia    ROS:  Constitutional: No fevers, chills, No weight loss  ENT: No allergies  CV: No chest pain  Pulm: No cough, No shortness of breath  Ophtho: No vision changes  GI: see HPI  Derm: No rash  Heme: No lymphadenopathy, No bruising  MSK: No arthritis  : No dysuria, No hematuria  Endo: No hot or cold intolerance  Neuro: No syncope, No seizure  Psych: No anxiety, No depression    Medical History:  has a past medical history of Allergy; Brito's esophagus; Basal cell cancer; Diverticulosis; Edema; Obesity; Rectovaginal fistula (6/28/2012); and Vitamin D deficiency disease.    Surgical History:  has a past surgical history that includes Rectovaginal fistula repair;  "Cholecystectomy; Mohs' procedure of face; Colonoscopy; Esophagogastroduodenoscopy; Laparoscopic Nissen fundoplication (2/15/16); Inguinal hernia repair; and Umbilical hernia repair.    Family History: family history includes Arthritis in her father; Cataracts in her father; Diabetes in her mother; Heart disease in her maternal aunt and maternal grandmother; Hyperlipidemia in her father; Hypertension in her mother; Miscarriages / Stillbirths in her mother; No Known Problems in her brother, maternal grandfather, maternal uncle, paternal aunt, paternal grandfather, paternal grandmother, paternal uncle, and sister. There is no history of Breast cancer, Colon cancer, Ovarian cancer, Amblyopia, Blindness, Cancer, Glaucoma, Macular degeneration, Retinal detachment, Strabismus, Stroke, or Thyroid disease..     Social History:  reports that she quit smoking about 33 years ago. She has a 1.25 pack-year smoking history. She has never used smokeless tobacco. She reports that she drinks about 0.6 oz of alcohol per week  She reports that she does not use illicit drugs.    Review of patient's allergies indicates:   Allergen Reactions    Penicillins Rash       Current Outpatient Prescriptions on File Prior to Visit   Medication Sig Dispense Refill    DIPHENHYDRAM/PE/DM/ACETAMIN/GG (MUCINEX FAST-MAX DAY-NITE COLD ORAL) Take by mouth.      hyoscyamine (LEVSIN/SL) 0.125 mg Subl Place 1 tablet (0.125 mg total) under the tongue every 12 (twelve) hours as needed (abdnominal cramps). 60 tablet 1    multivitamin (THERAGRAN) per tablet Take 1 tablet by mouth once daily.       No current facility-administered medications on file prior to visit.          Objective Findings:    Vital Signs:  Visit Vitals    /73    Pulse 90    Ht 5' 6" (1.676 m)    Wt 94.7 kg (208 lb 12.4 oz)    LMP  (LMP Unknown)    BMI 33.7 kg/m2     Body mass index is 33.7 kg/(m^2).    Physical Exam:  General Appearance: Well appearing in no acute " distress  Head:   Normocephalic, without obvious abnormality  Eyes:    No scleral icterus  ENT: Neck supple, Lips, mucosa, and tongue normal  Lungs: CTA bilaterally in anterior and posterior fields, no wheezes, no crackles.  Heart:  Regular rate and rhythm, S1, S2 normal, no murmurs heard  Abdomen: Soft, non tender, non distended with positive bowel sounds in all four quadrants.  Extremities: 2+ pulses, no edema  Skin: No rash  Neurologic: AAO x 3      Labs:  Lab Results   Component Value Date    WBC 5.28 02/07/2017    HGB 13.0 02/07/2017    HCT 38.4 02/07/2017     02/07/2017    CHOL 176 12/15/2016    TRIG 68 12/15/2016    HDL 57 12/15/2016    ALT 21 02/14/2017    AST 17 02/14/2017     02/07/2017    K 4.0 02/07/2017     02/07/2017    CREATININE 0.7 02/07/2017    BUN 10 02/07/2017    CO2 24 02/07/2017    TSH 0.736 02/07/2017    INR 1.5 (H) 12/14/2016    HGBA1C 5.8 12/15/2016       Imaging:    Endoscopy:      Assessment:  1. Functional diarrhea    2. Abdominal bloating    3. Food intolerance in adult    4. Brito's esophagus without dysplasia      Chronic diarrhea abdominal pain likely IBS  Recommendations:  1. Continue levsin since it helps her abdominal pain  2. Start questran 4g daily for diarrhea as advised by Dr. Barber  3. Trial of avoiding lactose completely  4.Cut out artificial sweeteners  5. Restart a lactose free probiotic, daily  6.Takes gas-x as need    Consider xifaxan and viberzi for IBS-D    No Follow-up on file.      Order summary:  Orders Placed This Encounter    cholestyramine, with sugar, 4 gram Powd         Thank you so much for allowing me to participate in the care of Rosanna Gacria PA-C

## 2017-02-14 NOTE — PATIENT INSTRUCTIONS
Cut out artificial sweeteners    Restart a lactose probiotic, daily    Trial of avoiding milk, cheese, and butter    Takes gas-x as need

## 2017-02-14 NOTE — MR AVS SNAPSHOT
[facility] - Gastroenterology  [address] [facility] [address]  Phone: [phone]  Fax: [phone]                  Rosanna Live   2017 10:00 AM   Office Visit    Description:  Female : 1960   Provider:  Shellie Garcia PA-C   Department:  [facility] - Gastroenterology           Reason for Visit     Diarrhea     Abdominal Pain           Diagnoses this Visit        Comments    Functional diarrhea    -  Primary            To Do List           Future Appointments        Provider Department Dept Phone    2017 8:00 AM Sanket Gallagher MD [facility] - Bariatric Surgery [phone]      Goals (5 Years of Data)     None       These Medications        Disp Refills Start End    cholestyramine, with sugar, 4 gram Powd 60 packet 3 2017     Take 4 g by mouth 2 (two) times daily as needed (diarrhea). Start once daily - Oral    Pharmacy: [id number] - [place], [place] - [address] [address] #: [phone]         [facility]     [facility] On [facility] Nurse Care Line -  Assistance  Registered nurses in the [facility] Center provide clinical advisement, health education, appointment booking, and other advisory services.  Call for this free service at [phone].             Medications           Message regarding Medications     Verify the changes and/or additions to your medication regime listed below are the same as discussed with your clinician today.  If any of these changes or additions are incorrect, please notify your healthcare provider.        START taking these NEW medications        Refills    cholestyramine, with sugar, 4 gram Powd 3    Sig: Take 4 g by mouth 2 (two) times daily as needed (diarrhea). Start once daily    Class: Normal    Route: Oral           Verify that the below list of medications is an accurate representation of the medications you are currently taking.  If none reported, the list may be blank. If incorrect, please contact  "your healthcare provider. Carry this list with you in case of emergency.           Current Medications     cholestyramine, with sugar, 4 gram Powd Take 4 g by mouth 2 (two) times daily as needed (diarrhea). Start once daily    DIPHENHYDRAM/PE/DM/ACETAMIN/GG (MUCINEX FAST-MAX DAY-NITE COLD ORAL) Take by mouth.    hyoscyamine (LEVSIN/SL) 0.125 mg Subl Place 1 tablet (0.125 mg total) under the tongue every 12 (twelve) hours as needed (abdnominal cramps).    multivitamin (THERAGRAN) per tablet Take 1 tablet by mouth once daily.           Clinical Reference Information           Your Vitals Were     BP Pulse Height Weight Last Period BMI    116/73 90 5' 6" (1.676 m) 94.7 kg (208 lb 12.4 oz) (LMP Unknown) 33.7 kg/m2      Blood Pressure          Most Recent Value    BP  116/73      Allergies as of 2/14/2017     Penicillins      Immunizations Administered on Date of Encounter - 2/14/2017     None      Instructions    Cut out artificial sweeteners    Restart a lactose probiotic, daily    Trial of avoiding milk, cheese, and butter    Takes gas-x as need       Language Assistance Services     ATTENTION: Language assistance services are available, free of charge. Please call [phone].      ATENCIÓN: Si davidla natacha, tiene a rivera disposición servicios gratuitos de asistencia lingüística. Llame al [phone].     KIAH Ý: N?u b?n nói Ti?ng Vi?t, có các d?ch v? h? tr? ngôn ng? mi?n phí dành cho b?n. G?i s? [phone].         [facility] complies with applicable Federal civil rights laws and does not discriminate on the basis of race, color, national origin, age, disability, or sex.        "

## 2017-02-15 LAB
ANA SER QL IF: NORMAL
HEP. B SURF AB, QUAL: POSITIVE
HEP. B SURF AB, QUANT.: 17 MIU/ML
MITOCHONDRIA AB TITR SER IF: NORMAL {TITER}

## 2017-02-16 LAB
A1AT PHENOTYP SERPL-IMP: NORMAL BANDS
A1AT SERPL NEPH-MCNC: 138 MG/DL
SMOOTH MUSCLE AB TITR SER IF: ABNORMAL {TITER}

## 2017-02-21 ENCOUNTER — PATIENT MESSAGE (OUTPATIENT)
Dept: GASTROENTEROLOGY | Facility: CLINIC | Age: 57
End: 2017-02-21

## 2017-02-22 DIAGNOSIS — R79.89 LFT ELEVATION: Primary | ICD-10-CM

## 2017-02-23 ENCOUNTER — TELEPHONE (OUTPATIENT)
Dept: GASTROENTEROLOGY | Facility: CLINIC | Age: 57
End: 2017-02-23

## 2017-02-23 ENCOUNTER — OFFICE VISIT (OUTPATIENT)
Dept: SURGERY | Facility: CLINIC | Age: 57
End: 2017-02-23
Payer: COMMERCIAL

## 2017-02-23 VITALS
DIASTOLIC BLOOD PRESSURE: 75 MMHG | WEIGHT: 208 LBS | HEIGHT: 66 IN | SYSTOLIC BLOOD PRESSURE: 125 MMHG | BODY MASS INDEX: 33.43 KG/M2 | HEART RATE: 80 BPM | TEMPERATURE: 98 F

## 2017-02-23 DIAGNOSIS — K44.9 HIATAL HERNIA: ICD-10-CM

## 2017-02-23 DIAGNOSIS — Z98.890 HISTORY OF NISSEN FUNDOPLICATION: Primary | ICD-10-CM

## 2017-02-23 PROCEDURE — 99213 OFFICE O/P EST LOW 20 MIN: CPT | Mod: S$GLB,,, | Performed by: SURGERY

## 2017-02-23 PROCEDURE — 99999 PR PBB SHADOW E&M-EST. PATIENT-LVL III: CPT | Mod: PBBFAC,,, | Performed by: SURGERY

## 2017-02-23 PROCEDURE — 1160F RVW MEDS BY RX/DR IN RCRD: CPT | Mod: S$GLB,,, | Performed by: SURGERY

## 2017-02-23 NOTE — PROGRESS NOTES
I have seen the patient, reviewed the Resident's history and physical, assessment and plan. I have personally interviewed and examined the patient at bedside and: agree with the findings.     S/p laparoscopic repair of a large hiatal hernia early last year.  She lifted her father and developed post prandial bloating, epigastric pain with nausea, diarrhea and weight loss.  She has been placed on levsin and cholestyramine with relief of symptoms.  It appears on work up that she has a small recurrent hernia but usually if symptomatic that causes dysphagia or gerd.  She is being worked up for lactose intolerance and elevated lfts.  We will recheck ugi in 6 months.

## 2017-02-23 NOTE — PROGRESS NOTES
Subjective:       Patient ID: Rosanna Live is a 56 y.o. female.    Chief Complaint: Follow-up    HPI About 1 year s/p lap hh with mesh and Nissen.  She was well until recently when she lifted her father and since then has had epigastric pain which is worse after eating.  She denies gerd and denies dysphagia.  She has been losing weight (10 pounds) due to the bloating. She also c/o diarrhea, feeling like food is moving right through.   Since her visit, she has been started on Levsin and feels much better.     Review of Systems   Constitutional: Negative for fever.   Respiratory: Negative for chest tightness and stridor.    Cardiovascular: Negative for chest pain.   Gastrointestinal: Positive for diarrhea. Negative for constipation.   Genitourinary: Negative for difficulty urinating.   Hematological: Does not bruise/bleed easily.       Objective:      Physical Exam   Constitutional: She is oriented to person, place, and time. She appears well-developed and well-nourished.   Abdominal: Soft. Bowel sounds are normal. She exhibits no distension. There is no tenderness.   Neurological: She is alert and oriented to person, place, and time.   Skin: Skin is warm and dry.   Psychiatric: She has a normal mood and affect. Her behavior is normal. Judgment and thought content normal.   Vitals reviewed.      Cbc, cmp: Reviewed  EGD: 4 cm recurrent hh with gastritis  CT: no hiatal hernia, normal postop appearance  UGI: Small recurrent hiatal hernia with superimpose operative change from Nissen fundoplication.  Small amount of induced gastroesophageal reflux during the exam overall concerning for possible loosening of Nissen fundoplication.    Assessment:       1. History of Nissen fundoplication    2. Hiatal hernia      She has improved on Levsin.     Plan:      Atypical symptoms for symptomatic hernia recurrence and she has improved on Levsin.     No indication for redo Nissen     Schedule GES

## 2017-02-23 NOTE — TELEPHONE ENCOUNTER
"----- Message from Carlos Manuel Barber MD sent at 2/22/2017  5:36 PM CST -----  Lizeth - please refer Rosanna to Hepatology clinic for evaluation of her slightly elevated Smooth Muscle Ab and history of elevated LFT's- orders placed.    Lizeth  please tell patient that their Hepatitis A, B and C labs are negative but they have "No" immunity to Hepatitis Hepatitis A and C.    She has immunity to hepatitis B.     There is currently No vaccination yet for Hepatitis C.    There is vaccinations for Hepatitis A,  and Recommend the Hepatitis A  vaccination series.        Its a two part vaccination series:   Day 1, and then again in 6 months from first one.      Orders were  placed.    "

## 2017-02-23 NOTE — LETTER
[facility]  [address]  [facility] [address]  Phone: [phone] February 23, 2017      Geena Roman MD  [address]  [facility] [address]    Patient: Rosanna Live   MR Number: [id number]   YOB: 1960   Date of Visit: 2/23/2017     Dear Dr. Roman:    Thank you for referring Rosanna Live to me for evaluation. Below are the relevant portions of my assessment and plan of care.     Rosanna Live is a 56-year-old female:   1. History of Nissen fundoplication    2. Hiatal hernia    She has improved on Levsin.      Plan:   - Atypical symptoms for symptomatic hernia recurrence and she has improved on    Levsin.  - No indication for redo Nissen  - Schedule GES    If you have questions, please do not hesitate to call me. I look forward to following Rosanna Marshall along with you.    Sincerely,      Sanket Gallagher MD   [facility] - General, Laparoscopic, Bariatric  Acute Care and Oncologic Surgery   - Surgical Weight Loss Program  [facility]    WSR/jose juan    CC  Carlos Manuel Barber MD

## 2017-03-10 ENCOUNTER — OFFICE VISIT (OUTPATIENT)
Dept: HEPATOLOGY | Facility: CLINIC | Age: 57
End: 2017-03-10
Payer: COMMERCIAL

## 2017-03-10 ENCOUNTER — CLINICAL SUPPORT (OUTPATIENT)
Dept: INFECTIOUS DISEASES | Facility: CLINIC | Age: 57
End: 2017-03-10
Payer: COMMERCIAL

## 2017-03-10 VITALS
WEIGHT: 209 LBS | TEMPERATURE: 97 F | DIASTOLIC BLOOD PRESSURE: 74 MMHG | RESPIRATION RATE: 18 BRPM | SYSTOLIC BLOOD PRESSURE: 128 MMHG | OXYGEN SATURATION: 97 % | HEIGHT: 66 IN | HEART RATE: 92 BPM | BODY MASS INDEX: 33.59 KG/M2

## 2017-03-10 DIAGNOSIS — R79.89 LFT ELEVATION: ICD-10-CM

## 2017-03-10 DIAGNOSIS — R79.89 ELEVATED LFTS: Primary | ICD-10-CM

## 2017-03-10 PROCEDURE — 1160F RVW MEDS BY RX/DR IN RCRD: CPT | Mod: S$GLB,,, | Performed by: NURSE PRACTITIONER

## 2017-03-10 PROCEDURE — 99243 OFF/OP CNSLTJ NEW/EST LOW 30: CPT | Mod: S$GLB,,, | Performed by: NURSE PRACTITIONER

## 2017-03-10 PROCEDURE — 90632 HEPA VACCINE ADULT IM: CPT | Mod: S$GLB,,, | Performed by: INTERNAL MEDICINE

## 2017-03-10 PROCEDURE — 99999 PR PBB SHADOW E&M-EST. PATIENT-LVL III: CPT | Mod: PBBFAC,,, | Performed by: NURSE PRACTITIONER

## 2017-03-10 PROCEDURE — 90471 IMMUNIZATION ADMIN: CPT | Mod: S$GLB,,, | Performed by: INTERNAL MEDICINE

## 2017-03-10 PROCEDURE — 99999 PR PBB SHADOW E&M-EST. PATIENT-LVL I: CPT | Mod: PBBFAC,,,

## 2017-03-10 NOTE — LETTER
March 10, 2017      Carlos Manuel Barber MD  [address]  [facility] [address]           [facility] - Hepatology  [address]  [facility] [address]  Phone: [phone]  Fax: [phone]          Patient: Rosanna Live   MR Number: [id number]   YOB: 1960   Date of Visit: 3/10/2017       Dear Dr. Carlos Manuel Barber:    Thank you for referring Rosanna Live to me for evaluation. Attached you will find relevant portions of my assessment and plan of care.    If you have questions, please do not hesitate to call me. I look forward to following Rosanna Live along with you.    Sincerely,    Elicia Wu, ANDREEA    Enclosure  CC:  No Recipients    If you would like to receive this communication electronically, please contact [email or web address] or [phone] to request more information on [employer] Link access.    For providers and/or their staff who would like to refer a patient to [facility], please contact us through our one-stop-shop provider referral line, [facility], at [phone].    If you feel you have received this communication in error or would no longer like to receive these types of communications, please e-mail [email or web address]

## 2017-03-10 NOTE — PROGRESS NOTES
I have personally performed a face to face diagnostic evaluation on this patient. I have reviewed and agree with today's findings and the care plan outlined by Elicia Wu NP      My findings are as follows:  Patient presents with a one time elevation of liver enzymes. ALT increased from 14 to 76; AST 17 to 49; ALKP normal and Tbil normal. Serologic w/u negative except weakly positive ASMA. No further w/u at this time for a one time elevation of liver enzymes. Recommend periodic checks of hepatic panels.      she will return to Elicia Wu NP for follow-up should her liver enzymes become elevated again.

## 2017-03-10 NOTE — PROGRESS NOTES
HEPATOLOGY CONSULTATION    Referring Physician: Carlos Manuel Barber MD   Current Corresponding Physician: Carlos Manuel Barber MD     Reason for Consultation: Consultation for evaluation of LFT elevation    History of Present Illness: Rosanna Live is a 57 y.o. female who presents for evaluation of   Chief Complaint   Patient presents with    LFT elevation     Patient seen by Dr. Barber for c/o abdominal pain and bloating.  States s/p hiatal hernia r/p in ~ December and then developed lower epigastric pain and diarrhea  EGD performed showing recurrent hiatal hernia and polyps,  She has additional hx of GERD w/ Barretts eshophagus, emergency tita when patient presented with sudden severe pain ~ 10 years ago  She is still under evaluation for her GI complaints w/ unknown etiology. Started on levsin for abd bloating which has helped   Questran prescribed for diarrhea but patient never started    Denies previously known liver disease or abnormal serologies  Denies symptoms of decompensation  Alcohol use, rare social drinker  Denies family hx of liver disease    Review of available records reveal:  Acute elevation in AST/ALT 2/7/17 x 1 but now normalized    Immunity to B  Hep A Igm negative  Hep C negative    SMA 1:80  AMA neg  A1AT neg  IGG 1004  Ceruloplasmin WNL  Ferritin WNL    CT abd: The liver is normal in size. There is a stable appearing subcentimeter right hepatic lobe hypodensity which is too small to accurately characterize.  The gallbladder is surgically absent.  There is no intra-or extrahepatic biliary ductal dilatation        Past Medical History:   Diagnosis Date    Allergy     Brito's esophagus     Basal cell cancer     Diverticulosis     Edema     chronic right foot     Obesity     Rectovaginal fistula 6/28/2012    Vitamin D deficiency disease      Outpatient Encounter Prescriptions as of 3/10/2017   Medication Sig Dispense Refill    hyoscyamine (LEVSIN/SL) 0.125 mg Subl Place 1  tablet (0.125 mg total) under the tongue every 12 (twelve) hours as needed (abdnominal cramps). 60 tablet 1    cholestyramine, with sugar, 4 gram Powd Take 4 g by mouth 2 (two) times daily as needed (diarrhea). Start once daily 60 packet 3    DIPHENHYDRAM/PE/DM/ACETAMIN/GG (MUCINEX FAST-MAX DAY-NITE COLD ORAL) Take by mouth.      multivitamin (THERAGRAN) per tablet Take 1 tablet by mouth once daily.       No facility-administered encounter medications on file as of 3/10/2017.      Review of patient's allergies indicates:   Allergen Reactions    Penicillins Rash     Family History   Problem Relation Age of Onset    Arthritis Father      Rhematoid    Hyperlipidemia Father     Cataracts Father     Miscarriages / Stillbirths Mother     Diabetes Mother     Hypertension Mother     Heart disease Maternal Aunt     Heart disease Maternal Grandmother     No Known Problems Sister     No Known Problems Brother     No Known Problems Maternal Uncle     No Known Problems Paternal Aunt     No Known Problems Paternal Uncle     No Known Problems Maternal Grandfather     No Known Problems Paternal Grandmother     No Known Problems Paternal Grandfather     Breast cancer Neg Hx     Colon cancer Neg Hx     Ovarian cancer Neg Hx     Amblyopia Neg Hx     Blindness Neg Hx     Cancer Neg Hx     Glaucoma Neg Hx     Macular degeneration Neg Hx     Retinal detachment Neg Hx     Strabismus Neg Hx     Stroke Neg Hx     Thyroid disease Neg Hx        Social History     Social History    Marital status:      Spouse name: N/A    Number of children: N/A    Years of education: N/A     Occupational History    Teacher [employer]     Social History Main Topics    Smoking status: Former Smoker     Packs/day: 0.25     Years: 5.00     Quit date: 8/31/1983    Smokeless tobacco: Never Used    Alcohol use 0.6 oz/week     1 Glasses of wine per week      Comment: Social    Drug use: No    Sexual  activity: No     Other Topics Concern    Not on file     Social History Narrative     Review of Systems   Constitutional: Negative for activity change, appetite change, chills, diaphoresis, fatigue, fever and unexpected weight change.   HENT: Negative for facial swelling and nosebleeds.    Respiratory: Negative for cough, chest tightness and shortness of breath.    Cardiovascular: Negative for chest pain, palpitations and leg swelling.   Gastrointestinal: Negative for abdominal distention, abdominal pain, blood in stool, constipation, diarrhea, nausea and vomiting.   Musculoskeletal: Negative for neck pain and neck stiffness.   Skin: Negative for color change, pallor and rash.   Neurological: Negative for dizziness, tremors, syncope, weakness, light-headedness and headaches.   Hematological: Negative for adenopathy. Does not bruise/bleed easily.   Psychiatric/Behavioral: Negative for agitation, behavioral problems, confusion and decreased concentration.     Vitals:    03/10/17 0746   BP: 128/74   Pulse: 92   Resp: 18   Temp: 96.8 °F (36 °C)       Physical Exam   Constitutional: She is oriented to person, place, and time. She appears well-developed and well-nourished. No distress.   HENT:   Head: Normocephalic and atraumatic.   Eyes: Conjunctivae are normal. Pupils are equal, round, and reactive to light. No scleral icterus.   Neck: Normal range of motion. Neck supple.   Cardiovascular: Normal rate.    Pulmonary/Chest: Effort normal.   Abdominal: Soft. She exhibits no distension and no mass. There is no tenderness. There is no rebound and no guarding.   Musculoskeletal: Normal range of motion.   Neurological: She is alert and oriented to person, place, and time. No cranial nerve deficit. She exhibits normal muscle tone. Coordination normal.   No asterixis   Skin: Skin is warm and dry. No rash noted. She is not diaphoretic. No erythema. No pallor.   Psychiatric: She has a normal mood and affect. Her behavior is  normal. Judgment and thought content normal.       MELD-Na score: 11 at 12/15/2016  5:25 AM  MELD score: 11 at 12/15/2016  5:25 AM  Calculated from:  Serum Creatinine: 0.60 mg/dL (Rounded to 1) at 12/15/2016  5:25 AM  Serum Sodium: 141 mmol/L (Rounded to 137) at 12/15/2016  5:25 AM  Total Bilirubin: 0.4 mg/dL (Rounded to 1) at 12/14/2016  7:46 PM  INR(ratio): 1.5 at 12/14/2016  7:46 PM  Age: 56 years    Lab Results   Component Value Date    GLU 96 02/07/2017    BUN 10 02/07/2017    CREATININE 0.7 02/07/2017    CALCIUM 9.1 02/07/2017     02/07/2017    K 4.0 02/07/2017     02/07/2017    PROT 7.1 02/14/2017    CO2 24 02/07/2017    ANIONGAP 7 (L) 02/07/2017    WBC 5.28 02/07/2017    RBC 4.41 02/07/2017    HGB 13.0 02/07/2017    HCT 38.4 02/07/2017    MCV 87 02/07/2017    MCH 29.5 02/07/2017    MCHC 33.9 02/07/2017     Lab Results   Component Value Date    RDW 12.5 02/07/2017     02/07/2017    MPV 9.2 02/07/2017    GRAN 2.6 02/07/2017    GRAN 49.6 02/07/2017    LYMPH 1.9 02/07/2017    LYMPH 36.0 02/07/2017    MONO 0.6 02/07/2017    MONO 11.9 02/07/2017    EOSINOPHIL 1.9 02/07/2017    BASOPHIL 0.4 02/07/2017    EOS 0.1 02/07/2017    BASO 0.02 02/07/2017    BNP 17 04/13/2011    CHOL 176 12/15/2016    TRIG 68 12/15/2016    HDL 57 12/15/2016    CHOLHDL 32.4 12/15/2016    TOTALCHOLEST 3.1 12/15/2016    ALBUMIN 3.5 02/14/2017    BILIDIR 0.2 02/14/2017    AST 17 02/14/2017    ALT 21 02/14/2017    ALKPHOS 99 02/14/2017    MG 2.1 02/16/2016    LABPROT 14.6 (H) 12/14/2016    INR 1.5 (H) 12/14/2016       Assessment and Plan:  Acute mild elevation in transaminases: w/o evidence of advanced liver disease  -etiology is unclear but has now resolved  -liver w/u serologies negative except for mild SMA elevated but not diagnostic of AIH  -CT shows essentially normal liver and spleen, small liver lesion which is indeterminate but stable  -patient is scheduled to receive the Hep A vaccine    No further evaluation is  warranted at this time.  Liver function can be monitored by her PCP and should be referred back to Hepatology for any persistent elevation in LFTs    Total duration of visit = 40 min, with > 50% spent counseling  Thank you for allowing me participate in this patient's care.     Patient Active Problem List   Diagnosis    Rectovaginal fistula    Brito's esophagus without dysplasia    Vitamin D imbalance    Encounter for long-term (current) use of other medications    History of Nissen fundoplication    Low hemoglobin and low hematocrit    Chest pain    Brito's esophagus    Epigastric pain    Bloating symptom    Food intolerance in adult    Hiatal hernia     Rosanna Live is a 57 y.o. female withLFT elevation

## 2017-03-16 ENCOUNTER — HOSPITAL ENCOUNTER (OUTPATIENT)
Dept: RADIOLOGY | Facility: HOSPITAL | Age: 57
Discharge: HOME | End: 2017-03-16
Attending: SURGERY
Payer: COMMERCIAL

## 2017-03-16 DIAGNOSIS — R10.13 EPIGASTRIC PAIN: ICD-10-CM

## 2017-03-16 PROCEDURE — 78264 GASTRIC EMPTYING IMG STUDY: CPT | Mod: 26,,, | Performed by: NUCLEAR MEDICINE

## 2017-03-16 PROCEDURE — 78264 GASTRIC EMPTYING IMG STUDY: CPT | Mod: TC

## 2017-03-21 ENCOUNTER — TELEPHONE (OUTPATIENT)
Dept: SURGERY | Facility: CLINIC | Age: 57
End: 2017-03-21

## 2017-03-21 NOTE — TELEPHONE ENCOUNTER
----- Message from Sanket Gallagher MD sent at 3/16/2017  5:56 PM CDT -----  Please let her know this is normal.  I believe we have scheduled a follow up appointment in several months.

## 2017-03-21 NOTE — TELEPHONE ENCOUNTER
Pt notified of normal GES results and that we would be following up with a UGI in august.  Pt verbalizes understanding.

## 2017-03-24 LAB
MPNR  FINAL DIAGNOSIS: NORMAL
MPNR  SPECIMEN TYPE: NORMAL

## 2017-04-17 DIAGNOSIS — R19.7 DIARRHEA, UNSPECIFIED TYPE: ICD-10-CM

## 2017-04-17 RX ORDER — HYOSCYAMINE SULFATE 0.12 MG/1
0.12 TABLET SUBLINGUAL EVERY 12 HOURS PRN
Qty: 60 TABLET | Refills: 1 | Status: SHIPPED | OUTPATIENT
Start: 2017-04-17 | End: 2017-04-27

## 2017-04-28 ENCOUNTER — OFFICE VISIT (OUTPATIENT)
Dept: GASTROENTEROLOGY | Facility: CLINIC | Age: 57
End: 2017-04-28
Payer: COMMERCIAL

## 2017-04-28 ENCOUNTER — OFFICE VISIT (OUTPATIENT)
Dept: INTERNAL MEDICINE | Facility: CLINIC | Age: 57
End: 2017-04-28
Payer: COMMERCIAL

## 2017-04-28 ENCOUNTER — LAB VISIT (OUTPATIENT)
Dept: LAB | Facility: HOSPITAL | Age: 57
End: 2017-04-28
Attending: INTERNAL MEDICINE
Payer: COMMERCIAL

## 2017-04-28 ENCOUNTER — PATIENT MESSAGE (OUTPATIENT)
Dept: INTERNAL MEDICINE | Facility: CLINIC | Age: 57
End: 2017-04-28

## 2017-04-28 VITALS
BODY MASS INDEX: 33.34 KG/M2 | DIASTOLIC BLOOD PRESSURE: 72 MMHG | HEART RATE: 92 BPM | WEIGHT: 207.44 LBS | HEIGHT: 66 IN | SYSTOLIC BLOOD PRESSURE: 110 MMHG

## 2017-04-28 VITALS
BODY MASS INDEX: 33.27 KG/M2 | HEIGHT: 66 IN | HEART RATE: 81 BPM | SYSTOLIC BLOOD PRESSURE: 118 MMHG | WEIGHT: 207 LBS | DIASTOLIC BLOOD PRESSURE: 76 MMHG

## 2017-04-28 DIAGNOSIS — J30.9 ALLERGIC RHINITIS, UNSPECIFIED ALLERGIC RHINITIS TRIGGER, UNSPECIFIED RHINITIS SEASONALITY: ICD-10-CM

## 2017-04-28 DIAGNOSIS — R79.89 LFT ELEVATION: Primary | ICD-10-CM

## 2017-04-28 DIAGNOSIS — R42 VERTIGO: ICD-10-CM

## 2017-04-28 DIAGNOSIS — H69.90 ETD (EUSTACHIAN TUBE DYSFUNCTION), UNSPECIFIED LATERALITY: ICD-10-CM

## 2017-04-28 DIAGNOSIS — R42 VERTIGO: Primary | ICD-10-CM

## 2017-04-28 LAB
ALBUMIN SERPL BCP-MCNC: 3.8 G/DL
ALP SERPL-CCNC: 113 U/L
ALT SERPL W/O P-5'-P-CCNC: 12 U/L
AST SERPL-CCNC: 14 U/L
BILIRUB DIRECT SERPL-MCNC: <0.1 MG/DL
BILIRUB SERPL-MCNC: 0.3 MG/DL
PROT SERPL-MCNC: 7.3 G/DL

## 2017-04-28 PROCEDURE — 99214 OFFICE O/P EST MOD 30 MIN: CPT | Mod: S$GLB,,, | Performed by: INTERNAL MEDICINE

## 2017-04-28 PROCEDURE — 99213 OFFICE O/P EST LOW 20 MIN: CPT | Mod: S$GLB,,, | Performed by: INTERNAL MEDICINE

## 2017-04-28 PROCEDURE — 1160F RVW MEDS BY RX/DR IN RCRD: CPT | Mod: S$GLB,,, | Performed by: INTERNAL MEDICINE

## 2017-04-28 PROCEDURE — 36415 COLL VENOUS BLD VENIPUNCTURE: CPT

## 2017-04-28 PROCEDURE — 99999 PR PBB SHADOW E&M-EST. PATIENT-LVL III: CPT | Mod: PBBFAC,,, | Performed by: INTERNAL MEDICINE

## 2017-04-28 PROCEDURE — 80076 HEPATIC FUNCTION PANEL: CPT

## 2017-04-28 RX ORDER — MECLIZINE HYDROCHLORIDE 25 MG/1
25 TABLET ORAL 3 TIMES DAILY PRN
COMMUNITY
End: 2018-04-24

## 2017-04-28 RX ORDER — FLUTICASONE PROPIONATE 50 MCG
1 SPRAY, SUSPENSION (ML) NASAL DAILY
Qty: 16 G | Refills: 4 | Status: SHIPPED | OUTPATIENT
Start: 2017-04-28 | End: 2017-06-01

## 2017-04-28 RX ORDER — HYOSCYAMINE SULFATE 0.12 MG/1
0.12 TABLET SUBLINGUAL
COMMUNITY
End: 2017-06-01

## 2017-04-28 NOTE — MR AVS SNAPSHOT
[facility] - Gastroenterology  [address]  [facility] [address]  Phone: [phone]  Fax: [phone]                  Rosanna Live   2017 8:00 AM   Office Visit    Description:  Female : 1960   Provider:  Carlos Manuel Barber MD   Department:  [facility]           Reason for Visit     Follow-up                To Do List           Future Appointments        Provider Department Dept Phone    2017 3:00 PM Geena Roman MD [facility] - Internal Medicine [phone]    2017 8:20 AM WESTON Dior NP [facility] - OB/GYN 5th Floor [phone]    2017 8:45 AM NOM [id number] 350 LB LIMIT [facility] [phone]    2017 8:40 AM INJECTION, INFECTIOUS DISEASES [facility]- ID Injection Room [phone]      Goals (5 Years of Data)     None      [facility]     [facility] Nurse Care Line -  Assistance  Unless otherwise directed by your provider, please contact [facility], our nurse care line that is available for  assistance.     Registered nurses in the [facility] Center provide: appointment scheduling, clinical advisement, health education, and other advisory services.  Call: [phone] (toll free)               Medications           Message regarding Medications     Verify the changes and/or additions to your medication regime listed below are the same as discussed with your clinician today.  If any of these changes or additions are incorrect, please notify your healthcare provider.        STOP taking these medications     cholestyramine, with sugar, 4 gram Powd Take 4 g by mouth 2 (two) times daily as needed (diarrhea). Start once daily    DIPHENHYDRAM/PE/DM/ACETAMIN/GG (MUCINEX FAST-MAX DAY-NITE COLD ORAL) Take by mouth.    multivitamin (THERAGRAN) per tablet Take 1 tablet by mouth once daily.           Verify that the below list of medications is an accurate representation of the medications you  "are currently taking.  If none reported, the list may be blank. If incorrect, please contact your healthcare provider. Carry this list with you in case of emergency.           Current Medications     hyoscyamine (LEVSIN/SL) 0.125 mg Subl Place 0.125 mg under the tongue as needed.    hyoscyamine (LEVSIN/SL) 0.125 mg Subl Place 1 tablet (0.125 mg total) under the tongue every 12 (twelve) hours as needed (abdnominal cramps).           Clinical Reference Information           Your Vitals Were     BP Pulse Height Weight Last Period BMI    118/76 81 5' 6" (1.676 m) 93.9 kg (207 lb 0.2 oz) (LMP Unknown) 33.41 kg/m2      Blood Pressure          Most Recent Value    BP  118/76      Allergies as of 4/28/2017     Penicillins      Immunizations Administered on Date of Encounter - 4/28/2017     None      Language Assistance Services     ATTENTION: Language assistance services are available, free of charge. Please call [phone].      ATENCIÓN: Si habla español, tiene a rivera disposición servicios gratuitos de asistencia lingüística. Llame al [phone].     KIAH Ý: N?u b?n nói Ti?ng Vi?t, có các d?ch v? h? tr? ngôn ng? mi?n phí dành cho b?n. G?i s? [phone].         [facility] complies with applicable Federal civil rights laws and does not discriminate on the basis of race, color, national origin, age, disability, or sex.        "

## 2017-04-28 NOTE — PROGRESS NOTES
"Subjective:       Patient ID: Rosanna Live is a 57 y.o. female.    Chief Complaint: Dizziness   this is a 57-year-old who presents today with vertigo symptoms she called for an appointment reports went to see somebody in her hometown earlier this morning but decided to come here for follow-up as well since she was here.  She has been having difficulty she was dizziness or vertigo symptoms for the last few days patient reports she's been having some ALLERGY sinus congestion and eustachian dysfunction.  She went to [facility] [address] where she had steroid.action earlier today.  She didn't fill it helped with sinus drainage and her ears are popping bit better but she's been having vertigo symptoms she also was started on Antivert which she has not picked up but plans to do so.  Since last visit she's been following with gastroenterology and hepatology for elevated liver tests she is off of proton pump inhibitor at her last GI tests were stable she denies chest pain or shortness of breath she has trouble on occasion with her back has mild headaches on occasion.  She denies shortness of breath or chest pain    HPI  Review of Systems   HENT: Positive for congestion.         Ear pressure fluid    Respiratory: Negative for cough and shortness of breath.    Musculoskeletal:        Stable chronic edema right foot    Neurological: Positive for dizziness.        Veritgo with postional changes     A bit jittery at times        Objective:     Blood pressure 110/72, pulse 92, height 5' 6" (1.676 m), weight 94.1 kg (207 lb 7.3 oz).    Physical Exam   Constitutional: No distress.   HENT:   Head: Normocephalic.   Mouth/Throat: Oropharynx is clear and moist.   Tm dull nasal congesiont  veritgo   Neck normal rom   Fatty tissue    Eyes: No scleral icterus.   Neck: Neck supple.   Cardiovascular: Normal rate, regular rhythm and normal heart sounds.    Pulmonary/Chest: Effort normal and breath sounds normal. No respiratory " distress.   Abdominal: Soft. Bowel sounds are normal. She exhibits no mass. There is no tenderness.   Musculoskeletal: She exhibits no edema.   Chronic right foot edema    Neurological: She is alert. No cranial nerve deficit.   Skin: No erythema.   Vitals reviewed.      Assessment:       1. Vertigo    2. ETD (eustachian tube dysfunction), unspecified laterality    3. Allergic rhinitis, unspecified allergic rhinitis trigger, unspecified rhinitis seasonality        Plan:       Rosanna Marshall was seen today for dizziness.    Diagnoses and all orders for this visit:    Vertigo  -     Basic metabolic panel; Future  -     TSH; Future  -     CBC auto differential; Future  -     EKG 12-lead; Future  -     Hepatic function panel; Future    ETD (eustachian tube dysfunction), unspecified laterality  Discussed with patient she did get a recent steroid injection which may improve her symptoms but we discussed conservative measures fall precautions trial of saline nasal spray and Flonase for symptomatic relief    She has Antivert RE prescribed which she plans to get and will call if no improvement or increase concerns    Continued fall precautions  Call if no impromvment or increased concerns  She has follow up appt scheduled

## 2017-04-28 NOTE — PROGRESS NOTES
CHIEF COMPLAINT: Follow up LFT's      HISTORY OF PRESENT ILLNESS: This is a 57-year-old white female with a past    medical history of short segment Brito's esophagus, no dysplasia who's last 3 EGD since  2012 shows no Brito's, who underwent Nissen in 2/15/2016 for   A symptomatic large hiatal hernia. Was doing great at last GI clinic visit on 8/16/2016 But about   A month a go she had to pick her dad up after a fall and has been having epigastric pain worse with   Foods. Has been taking NSAID's and no PPI since Nissen surgery. She had CT scan in ER  Few days ago and appears to have a hiatal hernia. Epigastric pain has completely   Resolved. She had a colonoscopy at the same time. EGD prior to surgery showed   a 6-cm sliding hiatal hernia that looked like  it was incarcerated. Pathology of the esophagus, no evidence of any dysplasia,  no evidence of Brito's esophagus, did have a little bit of eosinophils 25 per  high power field. She had some fundic gland polyps. Duodenum was normal. She  also had a colonoscopy at the same day, which was for chronic diarrhea. It was  complete all the way to the terminal ileum, excellent bowel prep, good look.    The patient had some nonbleeding internal hemorrhoids, some diverticulosis of    the sigmoid and descending colon. Random biopsies showed no evidence of any    microscopic colitis or inflammation or dysplasia. The patient's stool studies    have now normalized. She has done great post surgery and so glad he had surgery  Best thing she has done since getting  she says. She is off her PPI.      REVIEW OF SYSTEMS:  CONSTITUTIONAL: No fever, fatigue or weight loss.  ENT: No difficulty swallowing or sore throat.  CARDIOVASCULAR: No chest pain or shortness of breath.  RESPIRATORY: No dyspnea on exertion or cough.  GENITOURINARY: No dysuria, urgency or frequency.  MUSCULOSKELETAL: She has some musculoskeletal aches.  NEUROLOGIC: No syncope or stroke.  PSYCHIATRIC: No  "uncontrolled depression or anxiety.  ENDOCRINE: No cold or heat intolerance.  LYMPHATICS: No lymphadenopathy.  GASTROINTESTINAL: Epigastric abdominal pain has resolved.. No diarrhea or blood in her stool.      PAST MEDICAL HISTORY: She had a rectovaginal fistula repair by [facility]. She had    Brito's esophagus with hiatal hernia now at 6 cm, cannot find Brito's    anymore.      PAST SURGICAL HISTORY: As above.Nissen in 2016      SOCIAL HISTORY: Nonsmoker. Rarely drinks. 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 Her   in 2014, from    metastatic colon cancer, was treated at [facility]. She has 2 children, a    daughter who is 31 and a son who is about 26.      FAMILY HISTORY: No family members with colon cancer or Cantor syndrome.      ALLERGIES: She is allergic to penicillin.      MEDICATIONS: Have been reviewed.      PHYSICAL EXAMINATION:  /76  Pulse 81  Ht 5' 6" (1.676 m)  Wt 93.9 kg (207 lb 0.2 oz)  LMP  (LMP Unknown)  BMI 33.41 kg/m2  GENERAL: She is alert and oriented x4, not in any acute distress.  ABDOMEN: Slightly obese, but soft. No guarding, rebound or tenderness. No    bruits or pulsatile masses. No stigmata of chronic liver disease. No    appreciative ascites or hernias.  CARDIOVASCULAR: S1 and S2 without murmurs.  RESPIRATORY: Clear to auscultation bilaterally without wheezes.  SKIN: No petechiae or rash on exposed skin areas.  NEUROLOGIC: Alert and oriented x4.  PSYCHIATRIC: Normal speech, mentation and affect.  LYMPHATICS: No cervical or supraclavicular lymphadenopathy.      MEDICAL DECISION MAKING: As above. Brito's talk    Given. Images of CT scan personally reviewed by me and discuss with patient.  Labs reviewed. Gastric emptying normal. Labs reviewed.  CT scan images personally   Reviewed by me.      IMPRESSION AND PLAN:  1. Epigastric pain has resolved.   2. Brito, no dysplasia, cannot find Brito's esophagus x 3 she took herself " off her PPI. Recommend repeat EGD 3 years from her last , which should be in Jan 2020.  3. Diarrhea, has resolved.  4. History of Elevated LFT's seen by hepatology.  Recommend fasting LFT's every 6 months.    5. RT GI clinic in one year.

## 2017-04-28 NOTE — MR AVS SNAPSHOT
[facility]  [address]  [facility] [address]  Phone: [phone]  Fax: [phone]                  Rosanna Live   2017 1:30 PM   Office Visit    Description:  Female : 1960   Provider:  Geena Roman MD   Department:  [facility]           Reason for Visit     Dizziness           Diagnoses this Visit        Comments    Vertigo    -  Primary            To Do List           Future Appointments        Provider Department Dept Phone    2017 2:30 PM LAB, APPOINTMENT [facility] INTMED [facility] [phone]    2017 3:00 PM Geena Roman MD [facility] Internal Medicine [phone]    2017 8:20 AM WESTON Dior NP [facility] - OB/GYN 5th Floor [phone]    2017 8:45 AM NOM XRFLOP2 350 LB LIMIT [facility] [phone]    2017 8:40 AM INJECTION, INFECTIOUS DISEASES [facility] ID Injection Room [phone]      Goals (5 Years of Data)     None      [facility]     [facility] Nurse Care Line -  Assistance  Unless otherwise directed by your provider, please contact [facility], our nurse care line that is available for  assistance.     Registered nurses in the [facility] provide: appointment scheduling, clinical advisement, health education, and other advisory services.  Call: [phone] (toll free)               Medications           Message regarding Medications     Verify the changes and/or additions to your medication regime listed below are the same as discussed with your clinician today.  If any of these changes or additions are incorrect, please notify your healthcare provider.             Verify that the below list of medications is an accurate representation of the medications you are currently taking.  If none reported, the list may be blank. If incorrect, please contact your healthcare provider. Carry this list with you in  "case of emergency.           Current Medications     hyoscyamine (LEVSIN/SL) 0.125 mg Subl Place 0.125 mg under the tongue as needed.    hyoscyamine (LEVSIN/SL) 0.125 mg Subl Place 1 tablet (0.125 mg total) under the tongue every 12 (twelve) hours as needed (abdnominal cramps).    meclizine (ANTIVERT) 25 mg tablet Take 25 mg by mouth 3 (three) times daily as needed.           Clinical Reference Information           Your Vitals Were     BP Pulse Height Weight Last Period BMI    110/72 92 5' 6" (1.676 m) 94.1 kg (207 lb 7.3 oz) (LMP Unknown) 33.48 kg/m2      Blood Pressure          Most Recent Value    BP  110/72      Allergies as of 4/28/2017     Penicillins      Immunizations Administered on Date of Encounter - 4/28/2017     None      Orders Placed During Today's Visit     Future Labs/Procedures Expected by Expires    EKG 12-lead  4/28/2017 4/28/2018    Hepatic function panel  4/28/2017 6/29/2017    CBC auto differential  7/27/2017 (Approximate) 8/26/2017    TSH  7/27/2017 (Approximate) 8/26/2017    Basic metabolic panel  7/29/2017 (Approximate) 8/29/2017      Language Assistance Services     ATTENTION: Language assistance services are available, free of charge. Please call [phone].      ATENCIÓN: Si habla josueañol, tiene a rivera disposición servicios gratuitos de asistencia lingüística. Llame al [phone].     CHÚ Ý: N?u b?n nói Ti?ng Vi?t, có các d?ch v? h? tr? ngôn ng? mi?n phí dành cho b?n. G?i s? [phone].         [facility] complies with applicable Federal civil rights laws and does not discriminate on the basis of race, color, national origin, age, disability, or sex.        "

## 2017-05-01 ENCOUNTER — TELEPHONE (OUTPATIENT)
Dept: INTERNAL MEDICINE | Facility: CLINIC | Age: 57
End: 2017-05-01

## 2017-05-01 NOTE — TELEPHONE ENCOUNTER
----- Message from Geena Roman MD sent at 5/1/2017  7:39 AM CDT -----  Call and noitfy pt liver test was acceptable  Other labs ordered were ot drawn please schedule day of ekg or day of her choosing   Chemistry, thyroid and blood count

## 2017-05-02 ENCOUNTER — HOSPITAL ENCOUNTER (OUTPATIENT)
Dept: CARDIOLOGY | Facility: CLINIC | Age: 57
Discharge: HOME | End: 2017-05-02
Payer: COMMERCIAL

## 2017-05-02 DIAGNOSIS — R42 VERTIGO: ICD-10-CM

## 2017-05-02 PROCEDURE — 93000 ELECTROCARDIOGRAM COMPLETE: CPT | Mod: S$GLB,,, | Performed by: INTERNAL MEDICINE

## 2017-05-16 ENCOUNTER — TELEPHONE (OUTPATIENT)
Dept: INTERNAL MEDICINE | Facility: CLINIC | Age: 57
End: 2017-05-16
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
## 2017-05-18 ENCOUNTER — TELEPHONE (OUTPATIENT)
Dept: INTERNAL MEDICINE | Facility: CLINIC | Age: 57
End: 2017-05-18

## 2017-05-18 NOTE — TELEPHONE ENCOUNTER
----- Message from Ivy Rodriguez sent at 5/16/2017  3:17 PM CDT -----  Contact: Self/[phone]  Patient is returning a phone call.  Who left a message for the patient: Veena  Does patient know what this is regarding:  No  Comments: Please call and advise.          Thank you!!!

## 2017-05-19 ENCOUNTER — OFFICE VISIT (OUTPATIENT)
Dept: INTERNAL MEDICINE | Facility: CLINIC | Age: 57
End: 2017-05-19
Payer: COMMERCIAL

## 2017-05-19 VITALS
HEIGHT: 66 IN | HEART RATE: 82 BPM | BODY MASS INDEX: 33.45 KG/M2 | WEIGHT: 208.13 LBS | SYSTOLIC BLOOD PRESSURE: 102 MMHG | DIASTOLIC BLOOD PRESSURE: 62 MMHG

## 2017-05-19 DIAGNOSIS — R42 VERTIGO: ICD-10-CM

## 2017-05-19 DIAGNOSIS — J31.0 RHINITIS, UNSPECIFIED TYPE: ICD-10-CM

## 2017-05-19 DIAGNOSIS — R00.2 PALPITATIONS: ICD-10-CM

## 2017-05-19 DIAGNOSIS — F41.9 ANXIETY: Primary | ICD-10-CM

## 2017-05-19 PROCEDURE — 99213 OFFICE O/P EST LOW 20 MIN: CPT | Mod: S$GLB,,, | Performed by: INTERNAL MEDICINE

## 2017-05-19 PROCEDURE — 1160F RVW MEDS BY RX/DR IN RCRD: CPT | Mod: S$GLB,,, | Performed by: INTERNAL MEDICINE

## 2017-05-19 PROCEDURE — 99999 PR PBB SHADOW E&M-EST. PATIENT-LVL III: CPT | Mod: PBBFAC,,, | Performed by: INTERNAL MEDICINE

## 2017-05-19 RX ORDER — ESCITALOPRAM OXALATE 5 MG/1
5 TABLET ORAL DAILY
Qty: 30 TABLET | Refills: 4 | Status: SHIPPED | OUTPATIENT
Start: 2017-05-19 | End: 2018-04-24

## 2017-05-19 NOTE — PROGRESS NOTES
"Subjective:       Patient ID: Rosanna Live is a 57 y.o. female.    Chief Complaint: Follow-up   this is a 57-year-old who presents today for follow-up she has been having difficulty with recent vertigo her symptoms have improved since last visit she continues to have some mucus but no feve or progression in her ear seem to be doing better.  She took vertigo medication for a few days and her symptoms seem to have gotten better.  She has continued to have some trouble with feeling jittery.  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  She thinks the jitteriness she feels is in times of increased stress when she is working she gets hot flashes on occasion as well and she is up point now or she would like to consider medication just to see if it helps with the symptoms.  She denies shortness of breath or palpitations     HPI  Review of Systems   Constitutional: Negative for fever.   HENT:        Rhinitis   Mucus but improving    Respiratory: Negative for cough, shortness of breath and wheezing.    Cardiovascular: Negative for chest pain and palpitations.        Stable edema    Gastrointestinal: Negative for abdominal pain and constipation.   Neurological:        Vertigo resolved    Psychiatric/Behavioral:        Anxious jittery        Objective:     Blood pressure 102/62, pulse 82, height 5' 6" (1.676 m), weight 94.4 kg (208 lb 1.8 oz).    Physical Exam   Constitutional: No distress.   HENT:   Head: Normocephalic.   Mouth/Throat: Oropharynx is clear and moist.   rhihitis   Tm improved    Eyes: No scleral icterus.   Neck: Neck supple.   Cardiovascular: Normal rate, regular rhythm and normal heart sounds.    Pulmonary/Chest: Effort normal and breath sounds normal. No respiratory " distress.   Abdominal: Soft. Bowel sounds are normal. She exhibits no mass. There is no tenderness.   Musculoskeletal: She exhibits no edema.   Chronic right foot edema    Neurological: She is alert. No cranial nerve deficit.   Skin: No erythema.   Psychiatric:   Anxious    Vitals reviewed.      Assessment:       1. Anxiety    2. Rhinitis, unspecified type    3. Vertigo    4. Palpitations        Plan:       Rosanna Marshall was seen today for follow-up.    Diagnoses and all orders for this visit:    Anxiety    Rhinitis, unspecified type  Continue nasal sprays and she may consider an antihistamine    Vertigo  Seems to have resolved continue conservative measures    Palpitations discussed patient's EKG and continue conservative measures  We discussed Holter for further evaluation she declined at this time but will consider if symptoms persist she will call      Anxiety discussed with patient  -     escitalopram oxalate (LEXAPRO) 5 MG Tab; Take 1 tablet (5 mg total) by mouth once daily.     follow-up for recheck on medication in a few months for her physical

## 2017-05-19 NOTE — MR AVS SNAPSHOT
[facility]  [address]  [facility] [address]  Phone: [phone]  Fax: [phone]                  Rosanna Live   2017 11:30 AM   Office Visit    Description:  Female : 1960   Provider:  Geena Roman MD   Department:  [facility]           Reason for Visit     Follow-up                To Do List           Future Appointments        Provider Department Dept Phone    2017 8:20 AM [facility] 5th Floor [phone]    2017 8:45 AM NOM XRFLOP2 350 LB LIMIT [facility] [phone]    2017 8:40 AM INJECTION, INFECTIOUS DISEASES [facility] [phone]      Goals (5 Years of Data)     None       These Medications        Disp Refills Start End    escitalopram oxalate (LEXAPRO) 5 MG Tab 30 tablet 4 2017    Take 1 tablet (5 mg total) by mouth once daily. - Oral    Pharmacy: [id number] - [place], [place] - [address] [address] #: [phone]         [facility]     [facility] Care [facility] -  Assistance  Unless otherwise directed by your provider, please contact [facility], our nurse care line that is available for  assistance.     Registered nurses in the [facility] provide: appointment scheduling, clinical advisement, health education, and other advisory services.  Call: [phone] (toll free)               Medications           Message regarding Medications     Verify the changes and/or additions to your medication regime listed below are the same as discussed with your clinician today.  If any of these changes or additions are incorrect, please notify your healthcare provider.        START taking these NEW medications        Refills    escitalopram oxalate (LEXAPRO) 5 MG Tab 4    Sig: Take 1 tablet (5 mg total) by mouth once daily.    Class: Normal    Route: Oral           Verify that the below list of  "medications is an accurate representation of the medications you are currently taking.  If none reported, the list may be blank. If incorrect, please contact your healthcare provider. Carry this list with you in case of emergency.           Current Medications     fluticasone (FLONASE) 50 mcg/actuation nasal spray 1 spray by Each Nare route once daily.    hyoscyamine (LEVSIN/SL) 0.125 mg Subl Place 0.125 mg under the tongue as needed.    escitalopram oxalate (LEXAPRO) 5 MG Tab Take 1 tablet (5 mg total) by mouth once daily.    meclizine (ANTIVERT) 25 mg tablet Take 25 mg by mouth 3 (three) times daily as needed.           Clinical Reference Information           Your Vitals Were     BP Pulse Height Weight Last Period BMI    102/62 82 5' 6" (1.676 m) 94.4 kg (208 lb 1.8 oz) (LMP Unknown) 33.59 kg/m2      Blood Pressure          Most Recent Value    BP  102/62      Allergies as of 5/19/2017     Penicillins      Immunizations Administered on Date of Encounter - 5/19/2017     None      Language Assistance Services     ATTENTION: Language assistance services are available, free of charge. Please call [phone].      ATENCIÓN: Si habla español, tiene a rivera disposición servicios gratuitos de asistencia lingüística. Llame al [phone].     KIAH Ý: N?u b?n nói Ti?ng Vi?t, có các d?ch v? h? tr? ngôn ng? mi?n phí dành cho b?n. G?i s? [phone].         [facility] complies with applicable Federal civil rights laws and does not discriminate on the basis of race, color, national origin, age, disability, or sex.        "

## 2017-06-01 ENCOUNTER — OFFICE VISIT (OUTPATIENT)
Dept: OBSTETRICS AND GYNECOLOGY | Facility: CLINIC | Age: 57
End: 2017-06-01
Payer: COMMERCIAL

## 2017-06-01 VITALS
SYSTOLIC BLOOD PRESSURE: 124 MMHG | BODY MASS INDEX: 33.11 KG/M2 | DIASTOLIC BLOOD PRESSURE: 76 MMHG | WEIGHT: 206 LBS | HEIGHT: 66 IN

## 2017-06-01 DIAGNOSIS — Z12.39 BREAST CANCER SCREENING: ICD-10-CM

## 2017-06-01 DIAGNOSIS — Z78.0 POSTMENOPAUSE: ICD-10-CM

## 2017-06-01 DIAGNOSIS — Z01.419 VISIT FOR GYNECOLOGIC EXAMINATION: Primary | ICD-10-CM

## 2017-06-01 PROCEDURE — 99396 PREV VISIT EST AGE 40-64: CPT | Mod: S$GLB,,, | Performed by: NURSE PRACTITIONER

## 2017-06-01 PROCEDURE — 99999 PR PBB SHADOW E&M-EST. PATIENT-LVL III: CPT | Mod: PBBFAC,,, | Performed by: NURSE PRACTITIONER

## 2017-06-01 NOTE — PROGRESS NOTES
HISTORY OF PRESENT ILLNESS:    Rosanna Live is a 57 y.o. female , presents for a routine exam and has no gyn complaints.    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  -Has felt overwhelmed, anxious, so PCP started her on anti-anxiety medication and she is feeling much better.   -Currently dealing with vertigo - has seen ENT.    Past Medical History:   Diagnosis Date    Allergy     Brito's esophagus     Basal cell cancer     Diverticulosis     Edema     chronic right foot     Hiatal hernia 2017    Obesity     Rectovaginal fistula 2012    Vitamin D deficiency disease        Past Surgical History:   Procedure Laterality Date    CHOLECYSTECTOMY      COLONOSCOPY      ESOPHAGOGASTRODUODENOSCOPY      INGUINAL HERNIA REPAIR      LAPAROSCOPIC NISSEN FUNDOPLICATION  2/15/16    Mohs' procedure of face      RECTO VAGINAL FISTULA  ,     UMBILICAL HERNIA REPAIR          MEDICATIONS AND ALLERGIES:      Current Outpatient Prescriptions:     escitalopram oxalate (LEXAPRO) 5 MG Tab, Take 1 tablet (5 mg total) by mouth once daily., Disp: 30 tablet, Rfl: 4    meclizine (ANTIVERT) 25 mg tablet, Take 25 mg by mouth 3 (three) times daily as needed., Disp: , Rfl:     Review of patient's allergies indicates:   Allergen Reactions    Penicillins Rash       Family History   Problem Relation Age of Onset    Arthritis Father      Rhematoid    Hyperlipidemia Father     Cataracts Father     Miscarriages / Stillbirths Mother     Diabetes Mother     Hypertension Mother     Heart disease Maternal Aunt     Heart disease Maternal Grandmother     No Known Problems Sister     No Known Problems Brother     No Known Problems Maternal Uncle     No Known Problems Paternal Aunt     No Known Problems Paternal Uncle     No Known Problems Maternal Grandfather     No Known Problems Paternal Grandmother     No Known Problems  "Paternal Grandfather     Breast cancer Neg Hx     Colon cancer Neg Hx     Ovarian cancer Neg Hx        Social History     Social History    Marital status:      Spouse name: N/A    Number of children: N/A    Years of education: N/A     Occupational History    Teacher [employer]     Social History Main Topics    Smoking status: Former Smoker     Packs/day: 0.25     Years: 5.00     Quit date: 8/31/1983    Smokeless tobacco: Never Used    Alcohol use 0.6 oz/week     1 Glasses of wine per week      Comment: Social    Drug use: No    Sexual activity: No     Other Topics Concern    Not on file     Social History Narrative           OBSTETRIC HISTORY: Number of vaginal deliveries: 2    COMPREHENSIVE GYN HISTORY:  PAP HISTORY: Denies abnormal Paps. LAST PAP 5-24-16 NORMAL.   INFECTION HISTORY: Denies STDs. Denies PID.  BENIGN HISTORY: Reports uterine fibroids. Denies ovarian cysts. Denies endometriosis. Denies other conditions.  CANCER HISTORY: Denies cervical cancer. Denies uterine cancer or hyperplasia. Denies ovarian cancer. Denies vulvar cancer or pre-cancer. Denies vaginal cancer or pre-cancer. Denies breast cancer. Denies colon cancer.  SEXUAL ACTIVITY HISTORY: Denies being sexually active.  MENSTRUAL HISTORY: Denies menses. Pt is : 2012 not on HRT.      ROS:  GENERAL: No weight changes. No swelling. No fatigue. No fever. + DIZZINESS.  CARDIOVASCULAR: No chest pain. No shortness of breath. No leg cramps.   NEUROLOGICAL: No headaches. No vision changes.  BREASTS: No pain. No lumps. No discharge.  ABDOMEN: No pain. No nausea. No vomiting. No diarrhea. No constipation.  REPRODUCTIVE: No abnormal bleeding.   VULVA: No pain. No lesions. No itching.  VAGINA: No relaxation. No itching. No odor. No discharge. No lesions.  URINARY: No incontinence. No nocturia. No frequency. No dysuria.    /76   Ht 5' 6" (1.676 m)   Wt 93.4 kg (206 lb)   LMP  (LMP Unknown)   BMI 33.25 " kg/m²     PE:  APPEARANCE: Well nourished, well developed, in no acute distress.  AFFECT: WNL, alert and oriented x 3.  SKIN: No hirsutism or acne.  NECK: Neck symmetric without masses or thyromegaly.  NODES: No inguinal, cervical, axillary or femoral lymph node enlargement.  CHEST: Good respiratory effort.   ABDOMEN: OBESE. Soft. No tenderness or masses.  BREASTS: Symmetrical, no skin changes or visible lesions. No palpable masses, nipple discharge bilaterally.  PELVIC: ATROPHIC EXTERNAL FEMALE GENITALIA without lesions. Normal hair distribution. Adequate perineal body, normal urethral meatus. VAGINA DRY / ATROPHIC without lesions or discharge. CERVIX STENOTIC without lesions, discharge or tenderness. No significant cystocele or rectocele. Bimanual exam shows uterus to be normal size, regular, mobile and nontender. Adnexa without masses or tenderness. EXAM DIFFICULT DUE TO BODY HABITUS.  RECTAL: Rectovaginal exam confirms above with normal sphincter tone, no masses.  EXTREMITIES: No edema.    DIAGNOSIS:  1. Visit for gynecologic examination    2. Postmenopause    3. Breast cancer screening        PLAN:    Orders Placed This Encounter    Mammo Digital Screening Bilat with CAD   Up to date on BMD and Colon screening    COUNSELING:  The patient was counseled today on:  -osteoporosis prevention, calcium supplementation, regular weight bearing exercise;  -A.C.S. Pap and pelvic exam guidelines (pap every 3 years, no pap after age 65) and recommendations for yearly mammogram;  -to see her PCP for other health maintenance.    FOLLOW-UP with me annually.

## 2017-07-20 ENCOUNTER — HOSPITAL ENCOUNTER (OUTPATIENT)
Dept: RADIOLOGY | Facility: HOSPITAL | Age: 57
Discharge: HOME | End: 2017-07-20
Attending: NURSE PRACTITIONER
Payer: COMMERCIAL

## 2017-07-20 VITALS — HEIGHT: 66 IN | BODY MASS INDEX: 33.11 KG/M2 | WEIGHT: 206 LBS

## 2017-07-20 DIAGNOSIS — Z12.31 VISIT FOR SCREENING MAMMOGRAM: ICD-10-CM

## 2017-07-20 PROCEDURE — 77067 SCR MAMMO BI INCL CAD: CPT | Mod: 26,,, | Performed by: RADIOLOGY

## 2017-07-20 PROCEDURE — 77067 SCR MAMMO BI INCL CAD: CPT | Mod: TC

## 2017-07-27 ENCOUNTER — OFFICE VISIT (OUTPATIENT)
Dept: INTERNAL MEDICINE | Facility: CLINIC | Age: 57
End: 2017-07-27
Payer: COMMERCIAL

## 2017-07-27 VITALS
DIASTOLIC BLOOD PRESSURE: 62 MMHG | HEIGHT: 66 IN | BODY MASS INDEX: 33.55 KG/M2 | SYSTOLIC BLOOD PRESSURE: 110 MMHG | WEIGHT: 208.75 LBS | HEART RATE: 76 BPM

## 2017-07-27 DIAGNOSIS — R42 VERTIGO: ICD-10-CM

## 2017-07-27 DIAGNOSIS — Z00.00 ANNUAL PHYSICAL EXAM: Primary | ICD-10-CM

## 2017-07-27 DIAGNOSIS — F41.9 ANXIETY: ICD-10-CM

## 2017-07-27 PROCEDURE — 99999 PR PBB SHADOW E&M-EST. PATIENT-LVL III: CPT | Mod: PBBFAC,,, | Performed by: INTERNAL MEDICINE

## 2017-07-27 PROCEDURE — 99396 PREV VISIT EST AGE 40-64: CPT | Mod: S$GLB,,, | Performed by: INTERNAL MEDICINE

## 2017-07-27 NOTE — PROGRESS NOTES
"Subjective:       Patient ID: Rosanna Live is a 57 y.o. female.    Chief Complaint: Annual Exam   this is a 57-year-old who presents today for physical.  Patient reports that she has been having difficulty with vertigo most recently her symptoms seem to be improved and she did go see outlying ENT for evaluation she reports she had some treatments which did seem to help and is undergoing some additional evaluation including hearing and and imaging she will review results with them later this week.  She denies headaches chest pain or shortness of breath she reports that her anxiety level has been better she is a little less busy at work she has the prescription for Lexapro but has not been taking it every day but she may start doing so in the future if things persist she has been otherwise feeling well she continues have irritability and her bowels and alternating diarrhea she has been followed by GI and underwent testing     HPI  Review of Systems   Constitutional: Positive for activity change. Negative for fever.   HENT: Positive for congestion and postnasal drip.    Respiratory: Negative for cough, shortness of breath and wheezing.    Cardiovascular: Negative for chest pain and palpitations.   Gastrointestinal: Negative for abdominal pain and constipation.   Neurological: Negative for dizziness.        Vertigo some improvemetn        Objective:     Blood pressure 110/62, pulse 76, height 5' 6" (1.676 m), weight 94.7 kg (208 lb 12.4 oz).    Physical Exam   Constitutional: No distress.   HENT:   Head: Normocephalic.   Mouth/Throat: Oropharynx is clear and moist.   Rhinitis      Eyes: No scleral icterus.   Neck: Neck supple.   Cardiovascular: Normal rate, regular rhythm and normal heart sounds.  Exam reveals no gallop and no friction rub.    No murmur heard.  Pulmonary/Chest: Effort normal and breath sounds normal. No respiratory distress.   Breast : normal no masses or tenderness    Abdominal: Soft. Bowel " sounds are normal. She exhibits no mass. There is no tenderness.   Musculoskeletal: She exhibits no edema.   Stable lymphedema right    Neurological: She is alert.   Skin: No erythema.   Psychiatric: She has a normal mood and affect.   Vitals reviewed.      Assessment:       1. Annual physical exam    2. Anxiety    3. Vertigo        Plan:       Rosanna Marshall was seen today for annual exam.    Diagnoses and all orders for this visit:    Annual physical exam  Update additional labs with gi labs as planned   -     Lipid panel; Future  -     Hemoglobin A1c; Future    Anxiety improved call with concerns she has rx if she decides to take daily    Vertigo improving she is following with outlying ent and undergoing treatment additional evaluation  She will call with concern   Discussed adacel rx

## 2017-08-07 ENCOUNTER — LAB VISIT (OUTPATIENT)
Dept: LAB | Facility: HOSPITAL | Age: 57
End: 2017-08-07
Attending: INTERNAL MEDICINE
Payer: COMMERCIAL

## 2017-08-07 DIAGNOSIS — Z00.00 ANNUAL PHYSICAL EXAM: ICD-10-CM

## 2017-08-07 DIAGNOSIS — R79.89 LFT ELEVATION: ICD-10-CM

## 2017-08-07 LAB
ALBUMIN SERPL BCP-MCNC: 3.5 G/DL
ALP SERPL-CCNC: 109 U/L
ALT SERPL W/O P-5'-P-CCNC: 10 U/L
ANION GAP SERPL CALC-SCNC: 5 MMOL/L
AST SERPL-CCNC: 13 U/L
BILIRUB SERPL-MCNC: 0.5 MG/DL
BUN SERPL-MCNC: 13 MG/DL
CALCIUM SERPL-MCNC: 9.6 MG/DL
CHLORIDE SERPL-SCNC: 105 MMOL/L
CHOLEST/HDLC SERPL: 3.6 {RATIO}
CO2 SERPL-SCNC: 27 MMOL/L
CREAT SERPL-MCNC: 0.7 MG/DL
EST. GFR  (AFRICAN AMERICAN): >60 ML/MIN/1.73 M^2
EST. GFR  (NON AFRICAN AMERICAN): >60 ML/MIN/1.73 M^2
ESTIMATED AVG GLUCOSE: 114 MG/DL
GLUCOSE SERPL-MCNC: 93 MG/DL
HBA1C MFR BLD HPLC: 5.6 %
HDL/CHOLESTEROL RATIO: 27.8 %
HDLC SERPL-MCNC: 212 MG/DL
HDLC SERPL-MCNC: 59 MG/DL
LDLC SERPL CALC-MCNC: 127.6 MG/DL
NONHDLC SERPL-MCNC: 153 MG/DL
POTASSIUM SERPL-SCNC: 4.4 MMOL/L
PROT SERPL-MCNC: 6.9 G/DL
SODIUM SERPL-SCNC: 137 MMOL/L
TRIGL SERPL-MCNC: 127 MG/DL

## 2017-08-07 PROCEDURE — 83036 HEMOGLOBIN GLYCOSYLATED A1C: CPT

## 2017-08-07 PROCEDURE — 80061 LIPID PANEL: CPT

## 2017-08-07 PROCEDURE — 80053 COMPREHEN METABOLIC PANEL: CPT

## 2017-08-07 PROCEDURE — 36415 COLL VENOUS BLD VENIPUNCTURE: CPT

## 2017-08-18 ENCOUNTER — TELEPHONE (OUTPATIENT)
Dept: RADIOLOGY | Facility: HOSPITAL | Age: 57
End: 2017-08-18

## 2017-08-21 ENCOUNTER — HOSPITAL ENCOUNTER (OUTPATIENT)
Dept: RADIOLOGY | Facility: HOSPITAL | Age: 57
Discharge: HOME | End: 2017-08-21
Attending: SURGERY
Payer: COMMERCIAL

## 2017-08-21 DIAGNOSIS — K44.9 HIATAL HERNIA: ICD-10-CM

## 2017-08-21 DIAGNOSIS — Z98.890 HISTORY OF NISSEN FUNDOPLICATION: ICD-10-CM

## 2017-08-21 PROCEDURE — 74241 FL UPPER GI W KUB: CPT | Mod: 26,,, | Performed by: RADIOLOGY

## 2017-08-21 PROCEDURE — 74241 FL UPPER GI W KUB: CPT | Mod: TC

## 2017-09-08 ENCOUNTER — CLINICAL SUPPORT (OUTPATIENT)
Dept: INFECTIOUS DISEASES | Facility: CLINIC | Age: 57
End: 2017-09-08
Payer: COMMERCIAL

## 2017-09-08 DIAGNOSIS — R79.89 LFT ELEVATION: ICD-10-CM

## 2017-09-08 PROCEDURE — 90632 HEPA VACCINE ADULT IM: CPT | Mod: S$GLB,,, | Performed by: INTERNAL MEDICINE

## 2017-09-08 PROCEDURE — 99999 PR PBB SHADOW E&M-EST. PATIENT-LVL I: CPT | Mod: PBBFAC,,,

## 2017-09-08 PROCEDURE — 90471 IMMUNIZATION ADMIN: CPT | Mod: S$GLB,,, | Performed by: INTERNAL MEDICINE

## 2018-01-30 ENCOUNTER — OFFICE VISIT (OUTPATIENT)
Dept: SURGERY | Facility: CLINIC | Age: 58
End: 2018-01-30
Payer: COMMERCIAL

## 2018-01-30 VITALS
BODY MASS INDEX: 33.43 KG/M2 | WEIGHT: 208 LBS | HEART RATE: 82 BPM | SYSTOLIC BLOOD PRESSURE: 133 MMHG | DIASTOLIC BLOOD PRESSURE: 84 MMHG | HEIGHT: 66 IN | TEMPERATURE: 98 F

## 2018-01-30 DIAGNOSIS — Z98.890 HISTORY OF NISSEN FUNDOPLICATION: Primary | ICD-10-CM

## 2018-01-30 PROCEDURE — 99213 OFFICE O/P EST LOW 20 MIN: CPT | Mod: S$GLB,,, | Performed by: SURGERY

## 2018-01-30 PROCEDURE — 99999 PR PBB SHADOW E&M-EST. PATIENT-LVL III: CPT | Mod: PBBFAC,,, | Performed by: SURGERY

## 2018-01-30 PROCEDURE — 3008F BODY MASS INDEX DOCD: CPT | Mod: S$GLB,,, | Performed by: SURGERY

## 2018-01-30 NOTE — PROGRESS NOTES
Subjective:       Patient ID: Rosanna Live is a 57 y.o. female.    Chief Complaint: Follow-up    HPI About 2 years s/p lap hh with mesh and Nissen.  Last year she did some heavy lifting and felt epigastric pain.  She underwent CT abd/pelvis, UGI, and GES all which confirmed a small hiatal hernia which can be normal s/p HH repair with fundoplication.  Her symptoms largely resolved on their own.  Her only complaint since her last clinic appointment a year ago is very mild dysphagia to meats and this only occurs one per month, if that often.  She is not interested in pursuing redo HH repair at this time.    Review of Systems   Constitutional: Negative for fever.   Respiratory: Negative for chest tightness and stridor.    Cardiovascular: Negative for chest pain.   Gastrointestinal: Negative for constipation and diarrhea.   Genitourinary: Negative for difficulty urinating.   Hematological: Does not bruise/bleed easily.       Objective:      Physical Exam   Constitutional: She is oriented to person, place, and time. She appears well-developed and well-nourished.   Abdominal: Soft. Bowel sounds are normal. She exhibits no distension. There is no tenderness.   Neurological: She is alert and oriented to person, place, and time.   Skin: Skin is warm and dry.   Psychiatric: She has a normal mood and affect. Her behavior is normal. Judgment and thought content normal.   Vitals reviewed.      Cbc, cmp: Reviewed  EGD: 4 cm recurrent hh with gastritis  CT: no hiatal hernia, normal postop appearance  UGI: Small recurrent hiatal hernia with superimpose operative change from Nissen fundoplication.  Small amount of induced gastroesophageal reflux during the exam overall concerning for possible loosening of Nissen fundoplication.    Assessment:       56 yo F 2 years s/p hiatal hernia repair and nissen fundoplication.  Small hiatal hernia on postop imaging, likely normal, largely asymptomatic.    Plan:     F/u PRN

## 2018-01-30 NOTE — PROGRESS NOTES
I have seen the patient, reviewed the Resident's history and physical, assessment and plan. I have personally interviewed and examined the patient at bedside and: agree with the findings.     Doing well several years after hh repair.  She has no symptoms now except for mild dysphagia every few months to meat and dyspepsia with salads.  She denies heartburn, chest pain and reflux off of medications.  No further follow up necessary.

## 2018-01-30 NOTE — LETTER
[facility] - General Surgery  [address]  [facility] [address]  Phone: [phone] January 30, 2018      Geena Roman MD  [address]  [facility] [address]    Patient: Rosanna Live   MR Number: [id number]   YOB: 1960   Date of Visit: 1/30/2018     Dear Dr. Roman:    Thank you for referring Rosanna Live to me for evaluation. Below are the relevant portions of my assessment and plan of care.    Assessment:       Patient is a 57-year-old female two years status post hiatal hernia repair and nissen fundoplication.  Small hiatal hernia on post-op imaging, likely normal, largely asymptomatic.     Plan:      Follow up PRN    If you have questions, please do not hesitate to call me. I look forward to following Rosanna Marshall along with you.      Sincerely,      Sanket Gallagher MD   Section [facility] - General, Laparoscopic, Bariatric  Acute Care and Oncologic Surgery   - Surgical Weight Loss Program  [facility]    WSR/jose juan    CC  Carlos Manuel Barber MD

## 2018-02-16 ENCOUNTER — LAB VISIT (OUTPATIENT)
Dept: LAB | Facility: HOSPITAL | Age: 58
End: 2018-02-16
Attending: INTERNAL MEDICINE
Payer: COMMERCIAL

## 2018-02-16 DIAGNOSIS — R79.89 LFT ELEVATION: ICD-10-CM

## 2018-02-16 LAB
ALBUMIN SERPL BCP-MCNC: 3.3 G/DL
ALP SERPL-CCNC: 107 U/L
ALT SERPL W/O P-5'-P-CCNC: 9 U/L
ANION GAP SERPL CALC-SCNC: 6 MMOL/L
AST SERPL-CCNC: 13 U/L
BILIRUB SERPL-MCNC: 0.3 MG/DL
BUN SERPL-MCNC: 11 MG/DL
CALCIUM SERPL-MCNC: 9.2 MG/DL
CHLORIDE SERPL-SCNC: 107 MMOL/L
CO2 SERPL-SCNC: 27 MMOL/L
CREAT SERPL-MCNC: 0.6 MG/DL
EST. GFR  (AFRICAN AMERICAN): >60 ML/MIN/1.73 M^2
EST. GFR  (NON AFRICAN AMERICAN): >60 ML/MIN/1.73 M^2
GLUCOSE SERPL-MCNC: 95 MG/DL
POTASSIUM SERPL-SCNC: 4.2 MMOL/L
PROT SERPL-MCNC: 6.9 G/DL
SODIUM SERPL-SCNC: 140 MMOL/L

## 2018-02-16 PROCEDURE — 36415 COLL VENOUS BLD VENIPUNCTURE: CPT

## 2018-02-16 PROCEDURE — 80053 COMPREHEN METABOLIC PANEL: CPT

## 2018-04-24 ENCOUNTER — OFFICE VISIT (OUTPATIENT)
Dept: OPTOMETRY | Facility: CLINIC | Age: 58
End: 2018-04-24
Payer: COMMERCIAL

## 2018-04-24 DIAGNOSIS — H52.203 HYPEROPIA WITH ASTIGMATISM AND PRESBYOPIA, BILATERAL: ICD-10-CM

## 2018-04-24 DIAGNOSIS — H52.4 HYPEROPIA WITH ASTIGMATISM AND PRESBYOPIA, BILATERAL: ICD-10-CM

## 2018-04-24 DIAGNOSIS — H25.13 NUCLEAR SCLEROSIS, BILATERAL: Primary | ICD-10-CM

## 2018-04-24 DIAGNOSIS — H52.03 HYPEROPIA WITH ASTIGMATISM AND PRESBYOPIA, BILATERAL: ICD-10-CM

## 2018-04-24 PROCEDURE — 99999 PR PBB SHADOW E&M-EST. PATIENT-LVL II: CPT | Mod: PBBFAC,,, | Performed by: OPTOMETRIST

## 2018-04-24 PROCEDURE — 92015 DETERMINE REFRACTIVE STATE: CPT | Mod: S$GLB,,, | Performed by: OPTOMETRIST

## 2018-04-24 PROCEDURE — 92014 COMPRE OPH EXAM EST PT 1/>: CPT | Mod: S$GLB,,, | Performed by: OPTOMETRIST

## 2018-04-24 NOTE — PROGRESS NOTES
HPI     Concerns About Ocular Health    Additional comments: Pt is here for Ocular Health Check. Senile cataract,   unspecified.            Comments   Pt is here for Ocular Health Check.Senile cataract, unspecified.   Denies eye pain and f/f.   No itching, burning or tearing.   Vision is okay with correction.   No problems with glare.     Meds: No gtt          Last edited by ENID Ramirez on 4/24/2018  8:10 AM. (History)            Assessment /Plan     For exam results, see Encounter Report.    Nuclear sclerosis, bilateral  -Educated patient on presence of cataracts at today's exam, monitor at annual dilated fundus exam. 8+ years surgical estimate.    Hyperopia with astigmatism and presbyopia, bilateral  Eyeglass Final Rx     Eyeglass Final Rx       Sphere Cylinder Axis Dist VA Add    Right +1.00 +0.50 170 20/25- +2.50    Left +1.00 +0.50 005 20/25- +2.50    Type:  PAL    Expiration Date:  4/25/2019                  RTC 1 yr

## 2018-06-05 ENCOUNTER — TELEPHONE (OUTPATIENT)
Dept: OBSTETRICS AND GYNECOLOGY | Facility: CLINIC | Age: 58
End: 2018-06-05

## 2018-06-05 ENCOUNTER — OFFICE VISIT (OUTPATIENT)
Dept: OBSTETRICS AND GYNECOLOGY | Facility: CLINIC | Age: 58
End: 2018-06-05
Payer: COMMERCIAL

## 2018-06-05 VITALS
HEIGHT: 66 IN | SYSTOLIC BLOOD PRESSURE: 124 MMHG | WEIGHT: 212.81 LBS | BODY MASS INDEX: 34.2 KG/M2 | DIASTOLIC BLOOD PRESSURE: 83 MMHG

## 2018-06-05 DIAGNOSIS — Z12.39 BREAST CANCER SCREENING: ICD-10-CM

## 2018-06-05 DIAGNOSIS — Z86.018 HISTORY OF UTERINE FIBROID: ICD-10-CM

## 2018-06-05 DIAGNOSIS — Z78.0 POSTMENOPAUSE: ICD-10-CM

## 2018-06-05 DIAGNOSIS — Z01.419 WELL WOMAN EXAM WITH ROUTINE GYNECOLOGICAL EXAM: Primary | ICD-10-CM

## 2018-06-05 PROCEDURE — 99396 PREV VISIT EST AGE 40-64: CPT | Mod: S$GLB,,, | Performed by: NURSE PRACTITIONER

## 2018-06-05 PROCEDURE — 99999 PR PBB SHADOW E&M-EST. PATIENT-LVL III: CPT | Mod: PBBFAC,,, | Performed by: NURSE PRACTITIONER

## 2018-06-05 NOTE — PROGRESS NOTES
"HISTORY OF PRESENT ILLNESS:    Rosanna Live is a 58 y.o. female , presents for a routine exam and has no gyn complaints.    -Reports "stomach upset" after each meal and occasional fecal incontinence - has appointment with GI doctor.     Past Medical History:   Diagnosis Date    Allergy     Brito's esophagus     Basal cell cancer     Cataract     Diverticulosis     Edema     chronic right foot     Hiatal hernia 2017    Obesity     Rectovaginal fistula 2012    Vitamin D deficiency disease        Past Surgical History:   Procedure Laterality Date    CHOLECYSTECTOMY      COLONOSCOPY      ESOPHAGOGASTRODUODENOSCOPY      INGUINAL HERNIA REPAIR      LAPAROSCOPIC NISSEN FUNDOPLICATION  2/15/16    Mohs' procedure of face      RECTO VAGINAL FISTULA  ,     UMBILICAL HERNIA REPAIR          MEDICATIONS AND ALLERGIES:    No current outpatient prescriptions on file.    Review of patient's allergies indicates:   Allergen Reactions    Penicillins Rash       Family History   Problem Relation Age of Onset    Arthritis Father         Rhematoid    Hyperlipidemia Father     Cataracts Father     Glaucoma Father     Miscarriages / Stillbirths Mother     Diabetes Mother     Hypertension Mother     Glaucoma Mother     Heart disease Maternal Aunt     Heart disease Maternal Grandmother     No Known Problems Sister     No Known Problems Brother     No Known Problems Maternal Uncle     No Known Problems Paternal Aunt     No Known Problems Paternal Uncle     No Known Problems Maternal Grandfather     No Known Problems Paternal Grandmother     No Known Problems Paternal Grandfather     Breast cancer Neg Hx     Colon cancer Neg Hx     Ovarian cancer Neg Hx        Social History     Social History    Marital status:      Spouse name: N/A    Number of children: N/A    Years of education: N/A     Occupational History    Teacher [employer] "    Social History Main Topics    Smoking status: Former Smoker     Packs/day: 0.25     Years: 5.00     Quit date: 8/31/1983    Smokeless tobacco: Never Used    Alcohol use 0.6 oz/week     1 Glasses of wine per week      Comment: Social    Drug use: No    Sexual activity: No     Other Topics Concern    Not on file     Social History Narrative           OBSTETRIC HISTORY: Number of vaginal deliveries: 2    COMPREHENSIVE GYN HISTORY:  PAP HISTORY: Denies abnormal Paps. LAST PAP 5-24-16 NORMAL.   INFECTION HISTORY: Denies STDs. Denies PID.  BENIGN HISTORY: Reports uterine fibroids. Denies ovarian cysts. Denies endometriosis. Denies other conditions.  CANCER HISTORY: Denies cervical cancer. Denies uterine cancer or hyperplasia. Denies ovarian cancer. Denies vulvar cancer or pre-cancer. Denies vaginal cancer or pre-cancer. Denies breast cancer. Denies colon cancer.  SEXUAL ACTIVITY HISTORY: Denies being sexually active.  MENSTRUAL HISTORY: Denies menses. Pt is : 2012 not on HRT.     ROS:  GENERAL: No weight changes. No swelling. No fatigue. No fever.  CARDIOVASCULAR: No chest pain. No shortness of breath. No leg cramps.   NEUROLOGICAL: No headaches. No vision changes.  BREASTS: No pain. No lumps. No discharge.  ABDOMEN: + CRAMPING. No nausea. No vomiting. No diarrhea. No constipation. + FECAL INCONTINENCE.  REPRODUCTIVE: No abnormal bleeding.   VULVA: No pain. No lesions. No itching.  VAGINA: No relaxation. No itching. No odor. No discharge. No lesions.  URINARY: No incontinence. No nocturia. No frequency. No dysuria.    /83 (BP Location: Right arm, Patient Position: Sitting, BP Method: Medium (Automatic))   Ht 5' 6" (1.676 m)   Wt 96.5 kg (212 lb 12.8 oz)   LMP  (LMP Unknown)   BMI 34.35 kg/m²     PE:  APPEARANCE: Well nourished, well developed, in no acute distress.  AFFECT: WNL, alert and oriented x 3.  SKIN: No hirsutism or acne.  NECK: Neck symmetric without masses or thyromegaly.  NODES: " No inguinal, cervical, axillary or femoral lymph node enlargement.  CHEST: Good respiratory effort.   ABDOMEN: Soft. No tenderness or masses.  OBESE.  BREASTS: Symmetrical, no skin changes or visible lesions. No palpable masses, nipple discharge bilaterally.  PELVIC: ATROPHIC EXTERNAL FEMALE GENITALIA without lesions. Normal hair distribution. Adequate perineal body, normal urethral meatus. VAGINA DRY / ATROPHIC without lesions or discharge. CERVIX STENOTIC without lesions, discharge or tenderness. No significant cystocele or rectocele. Bimanual exam shows uterus to be 8-10 week size, regular, mobile and nontender. Adnexa without masses or tenderness. EXAM DIFFICULT DUE TO BODY HABITUS.  RECTAL: Rectovaginal exam confirms above with normal sphincter tone, no masses.  EXTREMITIES: No edema.    DIAGNOSIS:  1. Well woman exam with routine gynecological exam    2. Postmenopause    3. History of uterine fibroids    4. Breast cancer screening        PLAN:    Orders Placed This Encounter    Mammo Digital Screening Bilat with Tomos[employer]_CAD   Up to date on colonoscopy, BMD    COUNSELING:  The patient was counseled today on:  -fiber / bulking agent supplements for fecal incontinence;  -osteoporosis prevention, calcium supplementation, regular weight bearing exercise;  -A.C.S. Pap and pelvic exam guidelines (pap every 3 years, no pap after age 65) and recommendations for yearly mammogram;  -to see her PCP for other health maintenance.    FOLLOW-UP with me annually.

## 2018-07-26 ENCOUNTER — HOSPITAL ENCOUNTER (OUTPATIENT)
Dept: RADIOLOGY | Facility: HOSPITAL | Age: 58
Discharge: HOME | End: 2018-07-26
Attending: NURSE PRACTITIONER
Payer: COMMERCIAL

## 2018-07-26 ENCOUNTER — OFFICE VISIT (OUTPATIENT)
Dept: GASTROENTEROLOGY | Facility: CLINIC | Age: 58
End: 2018-07-26
Payer: COMMERCIAL

## 2018-07-26 VITALS
HEART RATE: 93 BPM | DIASTOLIC BLOOD PRESSURE: 75 MMHG | WEIGHT: 206.56 LBS | HEIGHT: 66 IN | BODY MASS INDEX: 33.2 KG/M2 | SYSTOLIC BLOOD PRESSURE: 115 MMHG

## 2018-07-26 DIAGNOSIS — K44.9 HIATAL HERNIA: Primary | ICD-10-CM

## 2018-07-26 DIAGNOSIS — Z12.39 BREAST CANCER SCREENING: ICD-10-CM

## 2018-07-26 PROCEDURE — 99213 OFFICE O/P EST LOW 20 MIN: CPT | Mod: S$GLB,,, | Performed by: INTERNAL MEDICINE

## 2018-07-26 PROCEDURE — 99999 PR PBB SHADOW E&M-EST. PATIENT-LVL II: CPT | Mod: PBBFAC,,, | Performed by: INTERNAL MEDICINE

## 2018-07-26 PROCEDURE — 77067 SCR MAMMO BI INCL CAD: CPT | Mod: 26,,, | Performed by: RADIOLOGY

## 2018-07-26 PROCEDURE — 77063 BREAST TOMOSYNTHESIS BI: CPT | Mod: TC

## 2018-07-26 PROCEDURE — 3008F BODY MASS INDEX DOCD: CPT | Mod: CPTII,S$GLB,, | Performed by: INTERNAL MEDICINE

## 2018-07-26 PROCEDURE — 77063 BREAST TOMOSYNTHESIS BI: CPT | Mod: 26,,, | Performed by: RADIOLOGY

## 2018-07-26 NOTE — PROGRESS NOTES
CHIEF COMPLAINT: Follow up Hiatal hernia      HISTORY OF PRESENT ILLNESS: This is a 58-year-old white female with a past    medical history of short segment Brito's esophagus, no dysplasia who's last 3 EGD since   shows no Brito's, who underwent Nissen in 2/15/2016 for   A symptomatic large hiatal hernia. Post surgery she had to pick her dad she thinks   That's how she disrupted her hernia repair. She  also had a colonoscopy at the same day, which was for chronic diarrhea. It was  complete all the way to the terminal ileum, excellent bowel prep, good look.    The patient had some nonbleeding internal hemorrhoids, some diverticulosis of    the sigmoid and descending colon. Random biopsies showed no evidence of any    microscopic colitis or inflammation or dysplasia. The patient's stool studies    have now normalized. She has done great post surgery and so glad he had surgery  Best thing she has done since getting  she says. She is off her PPI.      REVIEW OF SYSTEMS:  CONSTITUTIONAL: No fever, fatigue or weight loss.  ENT: No difficulty swallowing or sore throat.  CARDIOVASCULAR: No chest pain or shortness of breath.  RESPIRATORY: No dyspnea on exertion or cough.  GENITOURINARY: No dysuria, urgency or frequency.  MUSCULOSKELETAL: She has some musculoskeletal aches.  NEUROLOGIC: No syncope or stroke.  PSYCHIATRIC: No uncontrolled depression or anxiety.  ENDOCRINE: No cold or heat intolerance.  LYMPHATICS: No lymphadenopathy.  GASTROINTESTINAL: No abdominal pain. No diarrhea or blood in her stool.      PAST MEDICAL HISTORY: She had a rectovaginal fistula repair by CRS. She had    Brito's esophagus with hiatal hernia now at 4 cm, cannot find Brito's    anymore.      PAST SURGICAL HISTORY: As above.Nissen in 2016      SOCIAL HISTORY: Nonsmoker. Rarely drinks. 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 Her   in 2014, from    metastatic colon cancer, was  "treated at [employer]. She has 2 children, a    daughter who is 32 and a son who is about 27.      FAMILY HISTORY: No family members with colon cancer or Cantor syndrome.      ALLERGIES: She is allergic to penicillin.      MEDICATIONS: Have been reviewed.      PHYSICAL EXAMINATION:  /75   Pulse 93   Ht 5' 6" (1.676 m)   Wt 93.7 kg (206 lb 9.1 oz)   LMP  (LMP Unknown)   BMI 33.34 kg/m²   GENERAL: She is alert and oriented x4, not in any acute distress.  ABDOMEN: Slightly obese, but soft. No guarding, rebound or tenderness. No    bruits or pulsatile masses. No stigmata of chronic liver disease. No    appreciative ascites or hernias.  CARDIOVASCULAR: S1 and S2 without murmurs.  RESPIRATORY: Clear to auscultation bilaterally without wheezes.  SKIN: No petechiae or rash on exposed skin areas.  NEUROLOGIC: Alert and oriented x4.  PSYCHIATRIC: Normal speech, mentation and affect.  LYMPHATICS: No cervical or supraclavicular lymphadenopathy.      MEDICAL DECISION MAKING: As above. Brito's talk    Given. Images of CT scan personally reviewed by me and discuss with patient.  Labs reviewed. Gastric emptying normal. Labs reviewed.  CT scan images personally   Reviewed by me.  EGD 1/13/2017   Impression:           - Normal examined duodenum.                        - A few gastric polyps. Resected and retrieved.                        - Erythematous mucosa in the gastric body, antrum                         and pylorus. Biopsied.                        - 4 cm hiatus hernia. C0M1. Biopsied.  Recommendation:       - Discharge patient to home.                        - Follow an antireflux regimen.                        - Await pathology results.                        - Telephone endoscopist for pathology results in 2                         weeks.                        - Return to GI clinic.                        - The findings and recommendations were discussed                         with the patient.           "              - Repeat the upper endoscopy in 3 years for                         surveillance based on pathology results.        IMPRESSION AND PLAN:  1. Epigastric pain has resolved.   2. Brito, no dysplasia, cannot find Brito's esophagus x 3 she took herself off her PPI. Recommend repeat EGD 3 years from her last , which should be in Jan 2020.  3. Diarrhea, has resolved.  4. History of Elevated LFT's seen by hepatology.  Recommend fasting LFT's every 6 months with her PCP.    5. RT GI clinic in one year.

## 2018-07-27 ENCOUNTER — TELEPHONE (OUTPATIENT)
Dept: INTERNAL MEDICINE | Facility: CLINIC | Age: 58
End: 2018-07-27

## 2018-07-27 DIAGNOSIS — Z00.00 ANNUAL PHYSICAL EXAM: Primary | ICD-10-CM

## 2018-07-27 NOTE — TELEPHONE ENCOUNTER
----- Message from Thee Norman sent at 7/27/2018 10:27 AM CDT -----  Contact: Patient [phone]  Type: Orders Request    What orders/ testing are being requested? EPP    Is there a future appointment scheduled for the patient with PCP? Yes    When? 8/28/18

## 2018-08-21 ENCOUNTER — LAB VISIT (OUTPATIENT)
Dept: LAB | Facility: HOSPITAL | Age: 58
End: 2018-08-21
Attending: INTERNAL MEDICINE
Payer: COMMERCIAL

## 2018-08-21 DIAGNOSIS — Z00.00 ANNUAL PHYSICAL EXAM: ICD-10-CM

## 2018-08-21 LAB
ALBUMIN SERPL BCP-MCNC: 3.6 G/DL
ALP SERPL-CCNC: 85 U/L
ALT SERPL W/O P-5'-P-CCNC: 13 U/L
ANION GAP SERPL CALC-SCNC: 8 MMOL/L
AST SERPL-CCNC: 16 U/L
BASOPHILS # BLD AUTO: 0.05 K/UL
BASOPHILS NFR BLD: 0.7 %
BILIRUB DIRECT SERPL-MCNC: 0.2 MG/DL
BILIRUB SERPL-MCNC: 0.5 MG/DL
BUN SERPL-MCNC: 13 MG/DL
CALCIUM SERPL-MCNC: 9.2 MG/DL
CHLORIDE SERPL-SCNC: 108 MMOL/L
CHOLEST SERPL-MCNC: 189 MG/DL
CHOLEST/HDLC SERPL: 3.6 {RATIO}
CO2 SERPL-SCNC: 23 MMOL/L
CREAT SERPL-MCNC: 0.7 MG/DL
DIFFERENTIAL METHOD: ABNORMAL
EOSINOPHIL # BLD AUTO: 0.2 K/UL
EOSINOPHIL NFR BLD: 3.3 %
ERYTHROCYTE [DISTWIDTH] IN BLOOD BY AUTOMATED COUNT: 12.8 %
EST. GFR  (AFRICAN AMERICAN): >60 ML/MIN/1.73 M^2
EST. GFR  (NON AFRICAN AMERICAN): >60 ML/MIN/1.73 M^2
ESTIMATED AVG GLUCOSE: 114 MG/DL
GLUCOSE SERPL-MCNC: 91 MG/DL
HBA1C MFR BLD HPLC: 5.6 %
HCT VFR BLD AUTO: 40 %
HDLC SERPL-MCNC: 53 MG/DL
HDLC SERPL: 28 %
HGB BLD-MCNC: 13.1 G/DL
LDLC SERPL CALC-MCNC: 121 MG/DL
LYMPHOCYTES # BLD AUTO: 1.6 K/UL
LYMPHOCYTES NFR BLD: 23.8 %
MCH RBC QN AUTO: 29 PG
MCHC RBC AUTO-ENTMCNC: 32.8 G/DL
MCV RBC AUTO: 89 FL
MONOCYTES # BLD AUTO: 0.8 K/UL
MONOCYTES NFR BLD: 11.9 %
NEUTROPHILS # BLD AUTO: 4 K/UL
NEUTROPHILS NFR BLD: 60 %
NONHDLC SERPL-MCNC: 136 MG/DL
PLATELET # BLD AUTO: 399 K/UL
PMV BLD AUTO: 9.2 FL
POTASSIUM SERPL-SCNC: 3.9 MMOL/L
PROT SERPL-MCNC: 6.7 G/DL
RBC # BLD AUTO: 4.52 M/UL
SODIUM SERPL-SCNC: 139 MMOL/L
TRIGL SERPL-MCNC: 75 MG/DL
TSH SERPL DL<=0.005 MIU/L-ACNC: 0.92 UIU/ML
WBC # BLD AUTO: 6.72 K/UL

## 2018-08-21 PROCEDURE — 80048 BASIC METABOLIC PNL TOTAL CA: CPT

## 2018-08-21 PROCEDURE — 80076 HEPATIC FUNCTION PANEL: CPT

## 2018-08-21 PROCEDURE — 36415 COLL VENOUS BLD VENIPUNCTURE: CPT

## 2018-08-21 PROCEDURE — 80061 LIPID PANEL: CPT

## 2018-08-21 PROCEDURE — 84443 ASSAY THYROID STIM HORMONE: CPT

## 2018-08-21 PROCEDURE — 83036 HEMOGLOBIN GLYCOSYLATED A1C: CPT

## 2018-08-21 PROCEDURE — 85025 COMPLETE CBC W/AUTO DIFF WBC: CPT

## 2018-08-28 ENCOUNTER — OFFICE VISIT (OUTPATIENT)
Dept: INTERNAL MEDICINE | Facility: CLINIC | Age: 58
End: 2018-08-28
Payer: COMMERCIAL

## 2018-08-28 VITALS
SYSTOLIC BLOOD PRESSURE: 122 MMHG | DIASTOLIC BLOOD PRESSURE: 62 MMHG | HEIGHT: 66 IN | HEART RATE: 85 BPM | BODY MASS INDEX: 32.92 KG/M2 | WEIGHT: 204.81 LBS

## 2018-08-28 DIAGNOSIS — M79.89 RIGHT LEG SWELLING: ICD-10-CM

## 2018-08-28 DIAGNOSIS — Z00.00 ANNUAL PHYSICAL EXAM: Primary | ICD-10-CM

## 2018-08-28 DIAGNOSIS — D75.839 THROMBOCYTOSIS: ICD-10-CM

## 2018-08-28 DIAGNOSIS — Z98.890 HISTORY OF NISSEN FUNDOPLICATION: ICD-10-CM

## 2018-08-28 DIAGNOSIS — R74.8 ELEVATED LIVER ENZYMES: Primary | ICD-10-CM

## 2018-08-28 PROCEDURE — 99396 PREV VISIT EST AGE 40-64: CPT | Mod: S$GLB,,, | Performed by: INTERNAL MEDICINE

## 2018-08-28 PROCEDURE — 99999 PR PBB SHADOW E&M-EST. PATIENT-LVL III: CPT | Mod: PBBFAC,,, | Performed by: INTERNAL MEDICINE

## 2018-08-29 NOTE — PROGRESS NOTES
"Subjective:       Patient ID: Rosanna Live is a 58 y.o. female.    Chief Complaint: Annual Exam  58 year old presents for check up. She has been doing well reprorts trying to stay active. She has had no further reflux since prior nisson fundopliation she had for hiatla hernia. She  continues to follow with gi For prior hx barrets had egd earlier this year. She denies abdominal pain or so. She reprots stress level overall improved  She sees ent has trouble with rhinitis congestion at times. She has had chronic right ankle leg swelling no pain or progresion over time    HPI  Review of Systems   Constitutional: Negative for fever.   Respiratory: Negative for cough, shortness of breath and wheezing.    Cardiovascular: Negative for chest pain and palpitations.   Gastrointestinal: Negative for abdominal pain and constipation.   Neurological: Negative for dizziness.       Objective:     Blood pressure 122/62, pulse 85, height 5' 6" (1.676 m), weight 92.9 kg (204 lb 12.9 oz).    Physical Exam   Constitutional: No distress.   HENT:   Head: Normocephalic.   Mouth/Throat: Oropharynx is clear and moist.   Eyes: No scleral icterus.   Neck: Neck supple.   Cardiovascular: Normal rate, regular rhythm and normal heart sounds. Exam reveals no gallop and no friction rub.   No murmur heard.  Pulmonary/Chest: Effort normal and breath sounds normal. No respiratory distress.   Breast : normal no masses or tenderness    Abdominal: Soft. Bowel sounds are normal. She exhibits no mass. There is no tenderness.   Musculoskeletal: She exhibits no edema.   Chronic edema right leg    Neurological: She is alert.   Skin: No erythema.   Psychiatric: She has a normal mood and affect.   Vitals reviewed.      Assessment:       1. Annual physical exam    2. Right leg swelling    3. History of Nissen fundoplication    4. Thrombocytosis        Plan:       Rosanna Marshall was seen today for annual exam.    Diagnoses and all orders for this " visit:    Annual physical exam    Right leg swelling  -chronic discussed reimaging she would like to do so   -     Cardiology Lab US Lower Extremity Veins Right; Future    History of Nissen fundoplication  And jluis prior barrets recent egd she conitnues to follow with gi    Thrombocytosis hx of she has previously seen hem/onc stable  Asymptomatic     Labs reviewed with pt     Hx prior elevated liver function test have been normal  She has seen hepatology previously     She had recent gyn appt, she is up to date on her mammogram

## 2018-10-07 ENCOUNTER — PATIENT MESSAGE (OUTPATIENT)
Dept: INTERNAL MEDICINE | Facility: CLINIC | Age: 58
End: 2018-10-07

## 2018-10-16 ENCOUNTER — TELEPHONE (OUTPATIENT)
Dept: GASTROENTEROLOGY | Facility: CLINIC | Age: 58
End: 2018-10-16

## 2018-10-16 ENCOUNTER — CLINICAL SUPPORT (OUTPATIENT)
Dept: CARDIOLOGY | Facility: CLINIC | Age: 58
End: 2018-10-16
Attending: INTERNAL MEDICINE
Payer: COMMERCIAL

## 2018-10-16 DIAGNOSIS — M79.89 RIGHT LEG SWELLING: ICD-10-CM

## 2018-10-16 PROCEDURE — 93971 EXTREMITY STUDY: CPT | Mod: S$GLB,,, | Performed by: INTERNAL MEDICINE

## 2018-10-16 NOTE — TELEPHONE ENCOUNTER
----- Message from Mirella Lomax sent at 10/16/2018 11:45 AM CDT -----  Contact: Self- [phone]  Sunil- pt called to schedule a f/u appt- still experiencing abdominal pain- please contact pt at [phone]

## 2018-11-12 ENCOUNTER — OFFICE VISIT (OUTPATIENT)
Dept: GASTROENTEROLOGY | Facility: CLINIC | Age: 58
End: 2018-11-12
Payer: COMMERCIAL

## 2018-11-12 VITALS
DIASTOLIC BLOOD PRESSURE: 80 MMHG | SYSTOLIC BLOOD PRESSURE: 121 MMHG | HEART RATE: 91 BPM | BODY MASS INDEX: 32.59 KG/M2 | HEIGHT: 66 IN | WEIGHT: 202.81 LBS

## 2018-11-12 DIAGNOSIS — K58.9 IRRITABLE BOWEL SYNDROME, UNSPECIFIED TYPE: Primary | ICD-10-CM

## 2018-11-12 PROCEDURE — 99999 PR PBB SHADOW E&M-EST. PATIENT-LVL III: CPT | Mod: PBBFAC,,, | Performed by: STUDENT IN AN ORGANIZED HEALTH CARE EDUCATION/TRAINING PROGRAM

## 2018-11-12 PROCEDURE — 99213 OFFICE O/P EST LOW 20 MIN: CPT | Mod: S$GLB,,, | Performed by: STUDENT IN AN ORGANIZED HEALTH CARE EDUCATION/TRAINING PROGRAM

## 2018-11-12 PROCEDURE — 3008F BODY MASS INDEX DOCD: CPT | Mod: CPTII,S$GLB,, | Performed by: STUDENT IN AN ORGANIZED HEALTH CARE EDUCATION/TRAINING PROGRAM

## 2018-11-12 RX ORDER — HYOSCYAMINE SULFATE 0.125 MG
250 TABLET ORAL 2 TIMES DAILY PRN
Qty: 60 TABLET | Refills: 0 | Status: SHIPPED | OUTPATIENT
Start: 2018-11-12 | End: 2018-11-12

## 2018-11-12 RX ORDER — HYOSCYAMINE SULFATE 0.125 MG
125 TABLET ORAL 2 TIMES DAILY PRN
Qty: 60 TABLET | Refills: 0 | Status: SHIPPED | OUTPATIENT
Start: 2018-11-12 | End: 2018-12-12

## 2018-11-12 NOTE — PROGRESS NOTES
[facility]    Reason for visit: Irritable bowel syndrome  Referring Provider/PCP: Geena Roman MD    History of Present Illness:  Rosanna Live is a 58 y.o. female with a history of epigastric pain due to hiatal hernia s/p Nissen fundoplication in 2016 who is presenting for follow-up evaluation of abdominal pain.    The patient follows with Dr. Barber. She had chronic epigastric abdominal pain and EGD in the past consistent with short segment Brito's, with 6cm hiatal hernia, was on a PPI, and eventually had a Nissen Fundoplication in 2016 for symptomatic hiatal hernia, although she thinks that she might have reversed it while lifting her father after the procedure. Repeat EGD in 2017 with HH of 4 cm. She has been off PPI. She was last seen by Dr. Barber in 7/2018, and had no complaints then. She has a prior history of CCK.    The patient comes to the clinic today with complaints of continued abdominal pain. The patient states for the past year she had intermittent symptoms of epigastric abdominal pain associated with cramping and diarrhea. She states that she did not notice any provoking factors, and happen ~4-5 /month, lasting 2 days every time. She denies any symptoms in between these episodes. She states that the pain and diarrhea resolve after she takes Levsin. She states she does not like medications and that's why she waits 2 days before she takes the medication. She endorses continuous bloating. She states that she did not lose weight, and her appetite has not changed. She states that her symptoms do not wake her from sleep, although they seem to be the worst right after she wakes up. She denies blood or mucous associated with the stool.    PEndoHx:  EGD 2017   Impression:           - Normal examined duodenum.                        - A few gastric polyps. Resected and retrieved.                        - Erythematous mucosa in the gastric body, antrum                          and pylorus. Biopsied.                        - 4 cm hiatus hernia. C0M1. Biopsied.    Colonoscopy 2015  Impression:           - The examined portion of the ileum was normal.                        - Non-bleeding internal hemorrhoids.                        - Diverticulosis in the sigmoid colon and in the                         descending colon.                        - The examination was otherwise normal.                        - Biopsies were taken with a cold forceps from the                         cecum, ascending colon, transverse colon,                         descending colon, sigmoid colon and rectum for                         evaluation of microscopic colitis.    Review of Systems:   Constitutional: no fever, chills or change in weight   Eyes: no visual changes   ENT: no sore throat or dysphagia  Respiratory: no cough or shortness of breath   Cardiovascular: no chest pain or palpitations   Gastrointestinal: as per HPI  Hematologic/Lymphatic: no easy bruising or lymphadenopathy   Musculoskeletal: no arthralgias or myalgias   Neurological: no change in mental status  Behavioral/Psych: no change in mood    Medical History:  Past Medical History:   Diagnosis Date    Allergy     Brito's esophagus     Basal cell cancer     Cataract     Diverticulosis     Edema     chronic right foot     Hiatal hernia 1/31/2017    Obesity     Rectovaginal fistula 6/28/2012    Vitamin D deficiency disease        Past Surgical History:   Procedure Laterality Date    CHOLECYSTECTOMY      COLONOSCOPY      COLONOSCOPY N/A 8/31/2015    [facility]O (4TH FLR)    ESOPHAGOGASTRODUODENOSCOPY      ESOPHAGOGASTRODUODENOSCOPY (EGD) N/A 1/13/2017    Performed by [facility] ENDO (4TH FLR)    ESOPHAGOGASTRODUODENOSCOPY (EGD) N/A 8/31/2015    Performed by [facility] ENDO (4TH FLR)    FUNDOPLICATION-NISSEN-LAPAROSCOPIC N/A 2/15/2016     [facility] at [facility] 2ND FLR    INGUINAL HERNIA REPAIR      LAPAROSCOPIC NISSEN FUNDOPLICATION  2/15/16    MANOMETRY-ESOPHAGEAL N/A 2015    Performed by Jose Luis Culver [facility] at [facility] ENDO (4TH FLR)    Mohs' procedure of face      RECTO VAGINAL FISTULA  ,     REPAIR HERNIA LAPAROSCOPIC HIATAL N/A 2/15/2016    Performed by [facility] at [facility] OR 2ND FLR    UMBILICAL HERNIA REPAIR         Family History   Problem Relation Age of Onset    Arthritis Father         Rhematoid    Hyperlipidemia Father     Cataracts Father     Glaucoma Father     Heart disease Father     Miscarriages / Stillbirths Mother     Diabetes Mother     Hypertension Mother     Glaucoma Mother     Heart disease Maternal Aunt     Heart disease Maternal Grandmother     No Known Problems Sister     No Known Problems Brother     No Known Problems Maternal Uncle     No Known Problems Paternal Aunt     No Known Problems Paternal Uncle     No Known Problems Maternal Grandfather     No Known Problems Paternal Grandmother     No Known Problems Paternal Grandfather     Breast cancer Neg Hx     Colon cancer Neg Hx     Ovarian cancer Neg Hx        Social History     Socioeconomic History    Marital status:      Spouse name: Not on file    Number of children: Not on file    Years of education: Not on file    Highest education level: Not on file   Social Needs    Financial resource strain: Not on file    Food insecurity - worry: Not on file    Food insecurity - inability: Not on file    Transportation needs - medical: Not on file    Transportation needs - non-medical: Not on file   Occupational History    Occupation: Teacher     Employer: [employer]   Tobacco Use    Smoking status: Former Smoker     Packs/day: 0.25     Years: 5.00     Pack years: 1.25     Last attempt to quit: 1983     Years since quittin.2    Smokeless tobacco: Never Used    Substance and Sexual Activity    Alcohol use: Yes     Alcohol/week: 0.6 oz     Types: 1 Glasses of wine per week     Comment: Social    Drug use: No    Sexual activity: No     Partners: Male     Birth control/protection: None, Abstinence   Other Topics Concern    Not on file   Social History Narrative           No current outpatient medications on file prior to visit.     No current facility-administered medications on file prior to visit.        Review of patient's allergies indicates:   Allergen Reactions    Penicillins Rash       Physical Exam:  General: Alert and Oriented x3, no distress   Vitals:    11/12/18 1456   BP: 121/80   Pulse: 91     HEENT: Normocephalic, Atraumatic. No scleral icterus.  Lymph: No cervical lymphadenopathy  Resp: Good air entry bilaterally, no adventitious sounds.  Cardiac: S1 and S2 normal.  Abdomen: Normoactive bowel sounds. Non-distended. Normal tympany. Soft. Non-tender. No peritoneal signs.  Extremities: No peripheral edema. Normal bilateral pedal and radial pulses.  Neurologic: No gross neurological Deficits  Psych: Calm, cooperative. Normal mood and affect.    Laboratory:  Lab Results   Component Value Date     08/21/2018    K 3.9 08/21/2018     08/21/2018    CO2 23 08/21/2018    BUN 13 08/21/2018    CREATININE 0.7 08/21/2018    CALCIUM 9.2 08/21/2018    ANIONGAP 8 08/21/2018    ESTGFRAFRICA >60 08/21/2018    EGFRNONAA >60 08/21/2018       Lab Results   Component Value Date    ALT 13 08/21/2018    AST 16 08/21/2018    ALKPHOS 85 08/21/2018    BILITOT 0.5 08/21/2018       Lab Results   Component Value Date    WBC 6.72 08/21/2018    HGB 13.1 08/21/2018    HCT 40.0 08/21/2018    MCV 89 08/21/2018     (H) 08/21/2018       Microbiology:  No Pertinent Microbiology    Imaging:  No Pertinent Imaging    Assessment:  Rosanna Live is a 58 y.o. female who is presenting for follow-up evaluation of abdominal pain.    The patient has intermittent  abdomnial pain associated with bloating, abdominal cramps and diarrhea that improve with Levsin. In the absence of alarming features and prior negative lab, radiologic and endoscopic evaluations, this is likely due to IBS-D. The patient has not been taking her Levsin when symptoms start, and rather takes them after 2 days. We explained the likelihood that her symptoms are due to IBS and to take her medication as the symptoms arise      Plan:  1. Continue Levsin to be taken for symptoms.  2. Will bring patient back in 8-12 weeks to re-evaluate. If symptoms recur and do not respond to Levsin, she will contact us, we will order labs during pain episode including CBC, CMP and lipase as well as CT abdomen to evaluate for cause of symptoms  3. CRC Screening: Due in 2025  4. Follow-up in about 10 weeks (around 1/21/2019).    No orders of the defined types were placed in this encounter.

## 2018-11-12 NOTE — PROGRESS NOTES
I was present with Elli Singh MD the fellow during the above evaluation, including history and exam.  I discussed the case with the fellow and agree with the findings and plan as documented in the fellow's note.

## 2019-03-11 RX ORDER — HYOSCYAMINE SULFATE 0.12 MG/1
TABLET SUBLINGUAL
Qty: 60 TABLET | Refills: 0 | Status: SHIPPED | OUTPATIENT
Start: 2019-03-11 | End: 2019-04-30

## 2019-04-30 ENCOUNTER — OFFICE VISIT (OUTPATIENT)
Dept: OPTOMETRY | Facility: CLINIC | Age: 59
End: 2019-04-30
Payer: COMMERCIAL

## 2019-04-30 DIAGNOSIS — H52.03 HYPEROPIA WITH ASTIGMATISM AND PRESBYOPIA, BILATERAL: ICD-10-CM

## 2019-04-30 DIAGNOSIS — H52.4 HYPEROPIA WITH ASTIGMATISM AND PRESBYOPIA, BILATERAL: ICD-10-CM

## 2019-04-30 DIAGNOSIS — H52.203 HYPEROPIA WITH ASTIGMATISM AND PRESBYOPIA, BILATERAL: ICD-10-CM

## 2019-04-30 DIAGNOSIS — H25.13 NUCLEAR SCLEROSIS, BILATERAL: Primary | ICD-10-CM

## 2019-04-30 PROCEDURE — 92015 PR REFRACTION: ICD-10-PCS | Mod: S$GLB,,, | Performed by: OPTOMETRIST

## 2019-04-30 PROCEDURE — 92014 COMPRE OPH EXAM EST PT 1/>: CPT | Mod: S$GLB,,, | Performed by: OPTOMETRIST

## 2019-04-30 PROCEDURE — 92015 DETERMINE REFRACTIVE STATE: CPT | Mod: S$GLB,,, | Performed by: OPTOMETRIST

## 2019-04-30 PROCEDURE — 99999 PR PBB SHADOW E&M-EST. PATIENT-LVL II: CPT | Mod: PBBFAC,,, | Performed by: OPTOMETRIST

## 2019-04-30 PROCEDURE — 92014 PR EYE EXAM, EST PATIENT,COMPREHESV: ICD-10-PCS | Mod: S$GLB,,, | Performed by: OPTOMETRIST

## 2019-04-30 PROCEDURE — 99999 PR PBB SHADOW E&M-EST. PATIENT-LVL II: ICD-10-PCS | Mod: PBBFAC,,, | Performed by: OPTOMETRIST

## 2019-04-30 NOTE — PROGRESS NOTES
HPI     Dls: 4/24/18 Dr. Avila     58 y/o female presents today for Cataract annual  Ptwith no complaints.   Pt wears pal's. Requests new sRx    No tearing  No itching  No burning  No pain  No ha's  No floaters  No flashes    Eye meds  None        Last edited by Syd Avila, OD on 4/30/2019  8:36 AM. (History)            Assessment /Plan     For exam results, see Encounter Report.    Nuclear sclerosis, bilateral  -Educated patient on presence of cataracts at today's exam, monitor at annual dilated fundus exam. 8+ years surgical estimate.    Hyperopia with astigmatism and presbyopia, bilateral  Eyeglass Final Rx     Eyeglass Final Rx       Sphere Cylinder Axis Dist VA Add    Right +1.25 +0.75 165 20/20 +2.50    Left +1.00 +0.50 005 20/20 +2.50    Type:  PAL    Expiration Date:  4/30/2020                  RTC 1 yr

## 2019-05-09 ENCOUNTER — TELEPHONE (OUTPATIENT)
Dept: GASTROENTEROLOGY | Facility: CLINIC | Age: 59
End: 2019-05-09

## 2019-05-09 NOTE — TELEPHONE ENCOUNTER
----- Message from Mirella Lomax sent at 5/9/2019 10:14 AM CDT -----  Contact: Self- [phone]  Sunil- pt called to schedule f/u appt- received recall in the mail- please contact pt at [phone]

## 2019-05-09 NOTE — TELEPHONE ENCOUNTER
Spoke with patient.  Aware as of today  does not have anything available in July.  She has seen  in the past.  She will call back end of May, early June to see if 's schedule has opened up to set up an appointment.

## 2019-06-11 ENCOUNTER — TELEPHONE (OUTPATIENT)
Dept: OBSTETRICS AND GYNECOLOGY | Facility: CLINIC | Age: 59
End: 2019-06-11

## 2019-06-11 ENCOUNTER — OFFICE VISIT (OUTPATIENT)
Dept: OBSTETRICS AND GYNECOLOGY | Facility: CLINIC | Age: 59
End: 2019-06-11
Payer: COMMERCIAL

## 2019-06-11 VITALS
HEIGHT: 66 IN | SYSTOLIC BLOOD PRESSURE: 117 MMHG | DIASTOLIC BLOOD PRESSURE: 73 MMHG | WEIGHT: 205 LBS | BODY MASS INDEX: 32.95 KG/M2

## 2019-06-11 DIAGNOSIS — N82.3 RECTOVAGINAL FISTULA: ICD-10-CM

## 2019-06-11 DIAGNOSIS — Z78.0 POSTMENOPAUSE: ICD-10-CM

## 2019-06-11 DIAGNOSIS — Z86.018 HISTORY OF UTERINE FIBROID: ICD-10-CM

## 2019-06-11 DIAGNOSIS — Z12.39 BREAST CANCER SCREENING: ICD-10-CM

## 2019-06-11 DIAGNOSIS — Z01.419 WELL WOMAN EXAM WITH ROUTINE GYNECOLOGICAL EXAM: Primary | ICD-10-CM

## 2019-06-11 PROCEDURE — 88175 CYTOPATH C/V AUTO FLUID REDO: CPT

## 2019-06-11 PROCEDURE — 99999 PR PBB SHADOW E&M-EST. PATIENT-LVL III: CPT | Mod: PBBFAC,,, | Performed by: NURSE PRACTITIONER

## 2019-06-11 PROCEDURE — 99396 PR PREVENTIVE VISIT,EST,40-64: ICD-10-PCS | Mod: S$GLB,,, | Performed by: NURSE PRACTITIONER

## 2019-06-11 PROCEDURE — 99396 PREV VISIT EST AGE 40-64: CPT | Mod: S$GLB,,, | Performed by: NURSE PRACTITIONER

## 2019-06-11 PROCEDURE — 99999 PR PBB SHADOW E&M-EST. PATIENT-LVL III: ICD-10-PCS | Mod: PBBFAC,,, | Performed by: NURSE PRACTITIONER

## 2019-06-11 NOTE — PROGRESS NOTES
HISTORY OF PRESENT ILLNESS:    Rosanna Live is a 59 y.o. female , presents for a routine exam and has no gyn complaints.      Past Medical History:   Diagnosis Date    Allergy     Brito's esophagus     Basal cell cancer     Cataract     Diverticulosis     Edema     chronic right foot     Hiatal hernia 2017    Obesity     Rectovaginal fistula 2012    Vitamin D deficiency disease        Past Surgical History:   Procedure Laterality Date    CHOLECYSTECTOMY      COLONOSCOPY      COLONOSCOPY N/A 2015    [facility] ENDO (4TH FLR)    ESOPHAGOGASTRODUODENOSCOPY      ESOPHAGOGASTRODUODENOSCOPY (EGD) N/A 2017    [facility] by [facility] ENDO (4TH FLR)    ESOPHAGOGASTRODUODENOSCOPY (EGD) N/A 2015    [facility] ENDO (4TH FLR)    FUNDOPLICATION-NISSEN-LAPAROSCOPIC N/A 2/15/2016    [facility] Gallagher [facility] OR 2ND FLR    INGUINAL HERNIA REPAIR      LAPAROSCOPIC NISSEN FUNDOPLICATION  2/15/16    MANOMETRY-ESOPHAGEAL N/A 2015    Performed by Jose [facility] Cul[facility] ENDO (4TH FLR)    Mohs' procedure of face      RECTO VAGINAL FISTULA  ,     REPAIR HERNIA LAPAROSCOPIC HIATAL N/A 2/15/2016    [facility] OR 2ND FLR    UMBILICAL HERNIA REPAIR          MEDICATIONS AND ALLERGIES:    No current outpatient medications on file.    Review of patient's allergies indicates:   Allergen Reactions    Penicillins Rash       Family History   Problem Relation Age of Onset    Arthritis Father         Rhematoid    Hyperlipidemia Father     Cataracts Father     Glaucoma Father     Heart disease Father     Miscarriages / Stillbirths Mother     Diabetes Mother     Hypertension Mother     Glaucoma Mother     Cataracts Mother     Heart disease Maternal Aunt     Heart disease Maternal Grandmother     No Known Problems Sister      No Known Problems Brother     No Known Problems Maternal Uncle     No Known Problems Paternal Aunt     No Known Problems Paternal Uncle     No Known Problems Maternal Grandfather     No Known Problems Paternal Grandmother     No Known Problems Paternal Grandfather     Breast cancer Neg Hx     Colon cancer Neg Hx     Ovarian cancer Neg Hx        Social History     Socioeconomic History    Marital status:      Spouse name: Not on file    Number of children: Not on file    Years of education: Not on file    Highest education level: Not on file   Occupational History    Occupation: Teacher     Employer: [employer]   Social Needs    Financial resource strain: Not on file    Food insecurity:     Worry: Not on file     Inability: Not on file    Transportation needs:     Medical: Not on file     Non-medical: Not on file   Tobacco Use    Smoking status: Former Smoker     Packs/day: 0.25     Years: 5.00     Pack years: 1.25     Last attempt to quit: 1983     Years since quittin.8    Smokeless tobacco: Never Used   Substance and Sexual Activity    Alcohol use: Yes     Alcohol/week: 0.6 oz     Types: 1 Glasses of wine per week     Comment: Social    Drug use: No    Sexual activity: Never     Partners: Male     Birth control/protection: None, Abstinence   Lifestyle    Physical activity:     Days per week: Not on file     Minutes per session: Not on file    Stress: Not on file   Relationships    Social connections:     Talks on phone: Not on file     Gets together: Not on file     Attends Hinduism service: Not on file     Active member of club or organization: Not on file     Attends meetings of clubs or organizations: Not on file     Relationship status: Not on file   Other Topics Concern    Not on file   Social History Narrative           OBSTETRIC HISTORY: Number of vaginal deliveries: 2    COMPREHENSIVE GYN HISTORY:  PAP HISTORY: Denies abnormal Paps. LAST PAP  "5-24-16 NORMAL.   INFECTION HISTORY: Denies STDs. Denies PID.  BENIGN HISTORY: Reports uterine fibroids. Denies ovarian cysts. Denies endometriosis. Denies other conditions.  CANCER HISTORY: Denies cervical cancer. Denies uterine cancer or hyperplasia. Denies ovarian cancer. Denies vulvar cancer or pre-cancer. Denies vaginal cancer or pre-cancer. Denies breast cancer. Denies colon cancer.  SEXUAL ACTIVITY HISTORY: Denies being sexually active.  MENSTRUAL HISTORY: Denies menses. Pt is : 2012 not on HRT.     ROS:  GENERAL: + INTENTIONAL WT LOSS. No swelling. No fatigue. No fever.  CARDIOVASCULAR: No chest pain. No shortness of breath. No leg cramps.   NEUROLOGICAL: No headaches. No vision changes.  BREASTS: No pain. No lumps. No discharge.  ABDOMEN: No nausea. No vomiting. + CHRONIC INTERMITTENT DIARRHEA, CRAMPING. Sees Dr. Barber.  No constipation.  REPRODUCTIVE: No abnormal bleeding.   VULVA: No pain. No lesions. No itching.  VAGINA: No relaxation. No itching. No odor. No discharge. No lesions.  URINARY: No incontinence. No nocturia. No frequency. No dysuria.    /73   Ht 5' 6" (1.676 m)   Wt 93 kg (205 lb)   LMP  (LMP Unknown)   BMI 33.09 kg/m²   6-5-18 Wt 96.5 kg (212 lb 12.8 oz)    PE:  APPEARANCE: Well nourished, well developed, in no acute distress.  AFFECT: WNL, alert and oriented x 3.  SKIN: No hirsutism or acne.  NECK: Neck symmetric without masses or thyromegaly.  NODES: No inguinal, cervical, axillary or femoral lymph node enlargement.  CHEST: Good respiratory effort.   ABDOMEN: Soft. No tenderness or masses. OBESE.  BREASTS: Symmetrical, no skin changes or visible lesions. No palpable masses, nipple discharge bilaterally.  PELVIC: ATROPHIC EXTERNAL FEMALE GENITALIA without lesions. Normal hair distribution. Adequate perineal body, normal urethral meatus. VAGINA DRY/ATROPHIC without lesions or discharge. CERVIX STENOTIC without lesions, discharge or tenderness. No significant cystocele or " rectocele. Bimanual exam shows uterus to be normal size, regular, mobile and nontender. Adnexa without masses or tenderness. EXAM DIFFICULT DUE TO BODY HABITUS.  RECTAL: Rectovaginal exam confirms above with normal sphincter tone, no masses.  EXTREMITIES: No edema.    DIAGNOSIS:  1. Well woman exam with routine gynecological exam    2. Postmenopause    3. History of uterine fibroids    4. History of rectovaginal fistula    5. Breast cancer screening        PLAN:    Orders Placed This Encounter    Mammo Digital Screening Bilat w/ Joe    Liquid-based pap smear, screening   Mammogram due after July 26, 2019  Up to date on colonoscopy    COUNSELING:  The patient was counseled today on:  -osteoporosis prevention and regular weight bearing exercise;  -A.C.S. Pap and pelvic exam guidelines (pap every 3 years, no pap after age 65) and recommendations for yearly mammogram;  -to see her PCP for other health maintenance.    FOLLOW-UP with Dr. Jones annually.

## 2019-06-13 ENCOUNTER — TELEPHONE (OUTPATIENT)
Dept: GASTROENTEROLOGY | Facility: CLINIC | Age: 59
End: 2019-06-13

## 2019-06-13 NOTE — TELEPHONE ENCOUNTER
Spoke with patient. Aware  schedule is not opened yet for July.  Aware I will call her when it does open to set up an appointment.

## 2019-06-13 NOTE — TELEPHONE ENCOUNTER
----- Message from Lior Solares sent at 6/13/2019 11:44 AM CDT -----  Contact: pt: [phone]  Needs Advice    Reason for call: pt received recall letter called to schedule appt         Communication Preference: pt: [phone]

## 2019-07-29 ENCOUNTER — HOSPITAL ENCOUNTER (OUTPATIENT)
Dept: RADIOLOGY | Facility: HOSPITAL | Age: 59
Discharge: HOME | End: 2019-07-29
Attending: NURSE PRACTITIONER
Payer: COMMERCIAL

## 2019-07-29 DIAGNOSIS — Z12.39 BREAST CANCER SCREENING: ICD-10-CM

## 2019-07-29 PROCEDURE — 77063 BREAST TOMOSYNTHESIS BI: CPT | Mod: 26,,, | Performed by: RADIOLOGY

## 2019-07-29 PROCEDURE — 77067 SCR MAMMO BI INCL CAD: CPT | Mod: 26,,, | Performed by: RADIOLOGY

## 2019-07-29 PROCEDURE — 77067 SCR MAMMO BI INCL CAD: CPT | Mod: TC

## 2019-07-29 PROCEDURE — 77067 MAMMO DIGITAL SCREENING BILAT WITH TOMOSYNTHESIS_CAD: ICD-10-PCS | Mod: 26,,, | Performed by: RADIOLOGY

## 2019-07-29 PROCEDURE — 77063 MAMMO DIGITAL SCREENING BILAT WITH TOMOSYNTHESIS_CAD: ICD-10-PCS | Mod: 26,,, | Performed by: RADIOLOGY

## 2019-08-12 ENCOUNTER — OFFICE VISIT (OUTPATIENT)
Dept: GASTROENTEROLOGY | Facility: CLINIC | Age: 59
End: 2019-08-12
Payer: COMMERCIAL

## 2019-08-12 VITALS
HEART RATE: 94 BPM | WEIGHT: 200.38 LBS | BODY MASS INDEX: 32.2 KG/M2 | SYSTOLIC BLOOD PRESSURE: 117 MMHG | DIASTOLIC BLOOD PRESSURE: 78 MMHG | HEIGHT: 66 IN

## 2019-08-12 DIAGNOSIS — R11.0 NAUSEA: ICD-10-CM

## 2019-08-12 DIAGNOSIS — R10.13 EPIGASTRIC PAIN: ICD-10-CM

## 2019-08-12 DIAGNOSIS — Z98.890 HISTORY OF FUNDOPLICATION: Primary | ICD-10-CM

## 2019-08-12 DIAGNOSIS — R19.7 DIARRHEA, UNSPECIFIED TYPE: ICD-10-CM

## 2019-08-12 PROCEDURE — 99999 PR PBB SHADOW E&M-EST. PATIENT-LVL III: CPT | Mod: PBBFAC,,, | Performed by: STUDENT IN AN ORGANIZED HEALTH CARE EDUCATION/TRAINING PROGRAM

## 2019-08-12 PROCEDURE — 99213 OFFICE O/P EST LOW 20 MIN: CPT | Mod: S$GLB,,, | Performed by: STUDENT IN AN ORGANIZED HEALTH CARE EDUCATION/TRAINING PROGRAM

## 2019-08-12 PROCEDURE — 3008F BODY MASS INDEX DOCD: CPT | Mod: CPTII,S$GLB,, | Performed by: STUDENT IN AN ORGANIZED HEALTH CARE EDUCATION/TRAINING PROGRAM

## 2019-08-12 PROCEDURE — 99213 PR OFFICE/OUTPT VISIT, EST, LEVL III, 20-29 MIN: ICD-10-PCS | Mod: S$GLB,,, | Performed by: STUDENT IN AN ORGANIZED HEALTH CARE EDUCATION/TRAINING PROGRAM

## 2019-08-12 PROCEDURE — 99999 PR PBB SHADOW E&M-EST. PATIENT-LVL III: ICD-10-PCS | Mod: PBBFAC,,, | Performed by: STUDENT IN AN ORGANIZED HEALTH CARE EDUCATION/TRAINING PROGRAM

## 2019-08-12 PROCEDURE — 3008F PR BODY MASS INDEX (BMI) DOCUMENTED: ICD-10-PCS | Mod: CPTII,S$GLB,, | Performed by: STUDENT IN AN ORGANIZED HEALTH CARE EDUCATION/TRAINING PROGRAM

## 2019-08-12 RX ORDER — HYOSCYAMINE SULFATE 0.125 MG
125 TABLET ORAL EVERY 4 HOURS PRN
COMMUNITY
End: 2019-11-22

## 2019-08-12 RX ORDER — NAPROXEN SODIUM 220 MG
220 TABLET ORAL
COMMUNITY
End: 2019-11-22

## 2019-08-12 NOTE — PROGRESS NOTES
[facility]    Reason for visit: The primary encounter diagnosis was History of fundoplication. Diagnoses of Epigastric pain, Nausea, and Diarrhea, unspecified type were also pertinent to this visit.  Referring Provider/PCP: Geena Roman MD    History of Present Illness:  Rosanna Live is a 59 y.o. female with a history of chronic abdominal complaints, history of reflux status post Nissen fundoplication in 2016 who is presenting for follow-up evaluation of chronic symptoms of abdominal pain, nausea, and diarrhea.    Prior history (11/2018):  The patient used to follow with Dr. Barber. She had chronic epigastric abdominal pain and EGD in the past consistent with short segment Brito's, with 6cm hiatal hernia, was on a PPI, and eventually had a Nissen Fundoplication in 2016 for symptomatic hiatal hernia, although she thinks that she might have reversed it while lifting her father after the procedure. Repeat EGD in 2017 with HH of 4 cm. She has been off PPI. She was last seen by Dr. Barber in 7/2018, and had no complaints then. She has a prior history of CCK.     During that visit, she had complaints of continued abdominal pain. She stated that for the prior year she had intermittent symptoms of epigastric abdominal pain associated with cramping and diarrhea. She stated that she did not notice any provoking factors, and happen ~4-5 /month, lasting 2 days every time. She denies any symptoms in between these episodes. She stated that the pain and diarrhea resolve after she takes Levsin. She does not like medications and that's why she waits 2 days before she takes the medication. She endorses continuous bloating. She states that she did not lose weight, and her appetite has not changed. She states that her symptoms do not wake her from sleep, although they seem to be the worst right after she wakes up. She denies blood or mucous associated with the stool.    On last visit, we  explained to the patient that she most likely has IBS.  We recommended that she uses Levsin for her symptoms more frequently.  She was to report to us if her symptoms have not been improving, however she was waiting for this appointment.    Current history:  Today the patient states that she continues to have significant abdominal pain, mostly in the epigastric area with some discomfort in the lower abdomen.  She describes some cramping, with diarrhea right after she eats.  She describes the stool to be watery mixed with mucus.  She denies associated blood.  She denies waking up at night for bowel movement.  She states that she feels nauseated as soon as she wakes up in the morning, however the nausea does not wake her up from sleep.  She reports some aversion to food, and she lost about 6 lb in the past year.  Associated symptoms with nausea are excessive sweating and feeling jittery.  She denies vomiting. Reports that symptoms are almost daily, and are associated with periods of anxiety.  She does not use Levsin often, and she thinks that it is not working as well as it had been before.  She uses NSAIDs very rarely.  She did not try any dietary restrictions before, and she is not on any medications currently.  She is most concerned that her hiatal hernia repair is loosened up right now, and requests an EGD evaluation.  She denies significant reflux, or changes of dysphagia/odynophagia.  Prior EGD/colonoscopy as below.  She denies family history of any cancers.      PEndoHx:  EGD 2017   Impression:           - Normal examined duodenum.                        - A few gastric polyps. Resected and retrieved.                        - Erythematous mucosa in the gastric body, antrum                         and pylorus. Biopsied.                        - 4 cm hiatus hernia. C0M1. Biopsied.     Colonoscopy 2015  Impression:           - The examined portion of the ileum was normal.                        - Non-bleeding  internal hemorrhoids.                        - Diverticulosis in the sigmoid colon and in the                         descending colon.                        - The examination was otherwise normal.                        - Biopsies were taken with a cold forceps from the                         cecum, ascending colon, transverse colon,                         descending colon, sigmoid colon and rectum for                         evaluation of microscopic colitis.      Review of Systems:   Constitutional: no fever, chills or change in weight   Eyes: no visual changes   ENT: no sore throat or dysphagia  Respiratory: no cough or shortness of breath   Cardiovascular: no chest pain or palpitations   Gastrointestinal: as per HPI  Hematologic/Lymphatic: no easy bruising or lymphadenopathy   Musculoskeletal: no arthralgias or myalgias   Neurological: no change in mental status  Behavioral/Psych: no change in mood    Medical History:  Past Medical History:   Diagnosis Date    Allergy     Brito's esophagus     Basal cell cancer     Cataract     Diverticulosis     Edema     chronic right foot     Hiatal hernia 1/31/2017    Obesity     Rectovaginal fistula 6/28/2012    Vitamin D deficiency disease        Past Surgical History:   Procedure Laterality Date    CHOLECYSTECTOMY      COLONOSCOPY      COLONOSCOPY N/A 8/31/2015    [facility] (4TH FLR)    ESOPHAGOGASTRODUODENOSCOPY      ESOPHAGOGASTRODUODENOSCOPY (EGD) N/A 1/13/2017    Performed by [facility] ENDO (4TH FLR)    ESOPHAGOGASTRODUODENOSCOPY (EGD) N/A 8/31/2015    [facility] ([facility])    FUNDOPLICATION-NISSEN-LAPAROSCOPIC N/A 2/15/2016    Performed by [facility] Gallagher [facility] at [facility] OR 2ND FLR    INGUINAL HERNIA REPAIR      LAPAROSCOPIC NISSEN FUNDOPLICATION  2/15/16    MANOMETRY-ESOPHAGEAL N/A 12/16/2015    Performed by Jose Luis Culver MD at [facility] ENDO (4TH FLR)     Mohs' procedure of face      RECTO VAGINAL FISTULA  ,     REPAIR HERNIA LAPAROSCOPIC HIATAL N/A 2/15/2016    Per[facility] at [facility] OR [address] [place] [address]    UMBILICAL HERNIA REPAIR         Family History   Problem Relation Age of Onset    Arthritis Father         Rhematoid    Hyperlipidemia Father     Cataracts Father     Glaucoma Father     Heart disease Father     Miscarriages / Stillbirths Mother     Diabetes Mother     Hypertension Mother     Glaucoma Mother     Cataracts Mother     Heart disease Maternal Aunt     Heart disease Maternal Grandmother     No Known Problems Sister     No Known Problems Brother     No Known Problems Maternal Uncle     No Known Problems Paternal Aunt     No Known Problems Paternal Uncle     No Known Problems Maternal Grandfather     No Known Problems Paternal Grandmother     No Known Problems Paternal Grandfather     Breast cancer Neg Hx     Colon cancer Neg Hx     Ovarian cancer Neg Hx        Social History     Socioeconomic History    Marital status:      Spouse name: Not on file    Number of children: Not on file    Years of education: Not on file    Highest education level: Not on file   Occupational History    Occupation: Teacher     Employer: [employer]   Social Needs    Financial resource strain: Not on file    Food insecurity:     Worry: Not on file     Inability: Not on file    Transportation needs:     Medical: Not on file     Non-medical: Not on file   Tobacco Use    Smoking status: Former Smoker     Packs/day: 0.25     Years: 5.00     Pack years: 1.25     Last attempt to quit: 1983     Years since quittin.9    Smokeless tobacco: Never Used   Substance and Sexual Activity    Alcohol use: Yes     Alcohol/week: 0.6 oz     Types: 1 Glasses of wine per week     Comment: Social    Drug use: No    Sexual activity: Never     Partners: Male     Birth control/protection: None, Abstinence    Lifestyle    Physical activity:     Days per week: Not on file     Minutes per session: Not on file    Stress: Not on file   Relationships    Social connections:     Talks on phone: Not on file     Gets together: Not on file     Attends Worship service: Not on file     Active member of club or organization: Not on file     Attends meetings of clubs or organizations: Not on file     Relationship status: Not on file   Other Topics Concern    Not on file   Social History Narrative           Current Outpatient Medications on File Prior to Visit   Medication Sig Dispense Refill    hyoscyamine (ANASPAZ,LEVSIN) 0.125 mg Tab Take 125 mcg by mouth every 4 (four) hours as needed.      naproxen sodium (ANAPROX) 220 MG tablet Take 220 mg by mouth as needed.       No current facility-administered medications on file prior to visit.        Review of patient's allergies indicates:   Allergen Reactions    Penicillins Rash       Physical Exam:  General: Alert and Oriented x3, no distress   Vitals:    08/12/19 1503   BP: 117/78   Pulse: 94     HEENT: Normocephalic, Atraumatic. No scleral icterus.  Lymph: No cervical lymphadenopathy  Resp: Good air entry bilaterally, no adventitious sounds.  Cardiac: S1 and S2 normal.  Abdomen: Normoactive bowel sounds. Non-distended. Normal tympany. Soft. Tender to palpation Over the epigastric area, and lower abdomen.. No peritoneal signs.  Extremities: No peripheral edema. Normal bilateral pedal and radial pulses.  Neurologic: No gross neurological Deficits  Psych: Calm, cooperative. Normal mood and affect.    Laboratory:  Lab Results   Component Value Date     08/21/2018    K 3.9 08/21/2018     08/21/2018    CO2 23 08/21/2018    BUN 13 08/21/2018    CREATININE 0.7 08/21/2018    CALCIUM 9.2 08/21/2018    ANIONGAP 8 08/21/2018    ESTGFRAFRICA >60 08/21/2018    EGFRNONAA >60 08/21/2018       Lab Results   Component Value Date    ALT 13 08/21/2018    AST 16 08/21/2018     ALKPHOS 85 08/21/2018    BILITOT 0.5 08/21/2018       Lab Results   Component Value Date    WBC 6.72 08/21/2018    HGB 13.1 08/21/2018    HCT 40.0 08/21/2018    MCV 89 08/21/2018     (H) 08/21/2018       Microbiology:  No Pertinent Microbiology      Assessment:  Rosanna Live is a 59 y.o. female who is presenting for follow-up evaluation of chronic symptoms of nausea, abdominal pain, and diarrhea.    The patient has been tested extensively for different causes of her symptoms including celiac disease, microscopic colitis, pancreatic insufficiency, IBD, etc..  But all the workup has been negative.  Her symptoms are overall consistent with IBS diarrhea with some functional dyspepsia.  She has been losing some weight although unclear if it is significant.  I advised the patient to avoid dairy products as part of treatment of IBS diarrhea and rule out lactose intolerance (although this seems unlikely).  She is trying to avoid any synthetic medications, but she is okay with trying natural products.  I recommended that she tries over-the-counter IBgard and FDgard.  She will try the medications.    The patient states that she feels her hiatal hernia has recurred and her Nissen fundoplication has loosened.  We will schedule an EGD for evaluation    Plan:  1. Trial of IBgard and FDgard  2. Avoid dairy products  3. Schedule an EGD  4. CRC Screening: Due in 2025  Follow up in about 3 months (around 11/12/2019).    Elli Singh MD  Gastroenterology Fellow  Phone: [phone]    No orders of the defined types were placed in this encounter.

## 2019-09-17 ENCOUNTER — ANESTHESIA EVENT (OUTPATIENT)
Dept: ENDOSCOPY | Facility: HOSPITAL | Age: 59
End: 2019-09-17
Payer: COMMERCIAL

## 2019-09-18 ENCOUNTER — ANESTHESIA (OUTPATIENT)
Dept: ENDOSCOPY | Facility: HOSPITAL | Age: 59
End: 2019-09-18
Payer: COMMERCIAL

## 2019-09-18 ENCOUNTER — HOSPITAL ENCOUNTER (OUTPATIENT)
Facility: HOSPITAL | Age: 59
Discharge: HOME | End: 2019-09-18
Attending: INTERNAL MEDICINE | Admitting: INTERNAL MEDICINE
Payer: COMMERCIAL

## 2019-09-18 VITALS
OXYGEN SATURATION: 96 % | TEMPERATURE: 98 F | BODY MASS INDEX: 32.47 KG/M2 | SYSTOLIC BLOOD PRESSURE: 106 MMHG | HEIGHT: 66 IN | RESPIRATION RATE: 19 BRPM | HEART RATE: 67 BPM | WEIGHT: 202 LBS | DIASTOLIC BLOOD PRESSURE: 70 MMHG

## 2019-09-18 DIAGNOSIS — K22.70 BARRETT'S ESOPHAGUS WITHOUT DYSPLASIA: ICD-10-CM

## 2019-09-18 PROCEDURE — 63600175 PHARM REV CODE 636 W HCPCS: Performed by: INTERNAL MEDICINE

## 2019-09-18 PROCEDURE — 25000003 PHARM REV CODE 250: Performed by: NURSE ANESTHETIST, CERTIFIED REGISTERED

## 2019-09-18 PROCEDURE — E9220 PRA ENDO ANESTHESIA: ICD-10-PCS | Mod: ,,, | Performed by: NURSE ANESTHETIST, CERTIFIED REGISTERED

## 2019-09-18 PROCEDURE — 43239 PR EGD, FLEX, W/BIOPSY, SGL/MULTI: ICD-10-PCS | Mod: ,,, | Performed by: INTERNAL MEDICINE

## 2019-09-18 PROCEDURE — 88305 TISSUE EXAM BY PATHOLOGIST: CPT | Mod: 26,,, | Performed by: PATHOLOGY

## 2019-09-18 PROCEDURE — 88305 TISSUE EXAM BY PATHOLOGIST: CPT | Performed by: PATHOLOGY

## 2019-09-18 PROCEDURE — 27201012 HC FORCEPS, HOT/COLD, DISP: Performed by: INTERNAL MEDICINE

## 2019-09-18 PROCEDURE — 88305 TISSUE SPECIMEN TO PATHOLOGY - SURGERY: ICD-10-PCS | Mod: 26,,, | Performed by: PATHOLOGY

## 2019-09-18 PROCEDURE — 37000009 HC ANESTHESIA EA ADD 15 MINS: Performed by: INTERNAL MEDICINE

## 2019-09-18 PROCEDURE — 63600175 PHARM REV CODE 636 W HCPCS: Performed by: NURSE ANESTHETIST, CERTIFIED REGISTERED

## 2019-09-18 PROCEDURE — 43239 EGD BIOPSY SINGLE/MULTIPLE: CPT | Performed by: INTERNAL MEDICINE

## 2019-09-18 PROCEDURE — 37000008 HC ANESTHESIA 1ST 15 MINUTES: Performed by: INTERNAL MEDICINE

## 2019-09-18 PROCEDURE — 43239 EGD BIOPSY SINGLE/MULTIPLE: CPT | Mod: ,,, | Performed by: INTERNAL MEDICINE

## 2019-09-18 PROCEDURE — E9220 PRA ENDO ANESTHESIA: HCPCS | Mod: ,,, | Performed by: NURSE ANESTHETIST, CERTIFIED REGISTERED

## 2019-09-18 RX ORDER — PROPOFOL 10 MG/ML
VIAL (ML) INTRAVENOUS CONTINUOUS PRN
Status: DISCONTINUED | OUTPATIENT
Start: 2019-09-18 | End: 2019-09-18

## 2019-09-18 RX ORDER — SODIUM CHLORIDE 0.9 % (FLUSH) 0.9 %
10 SYRINGE (ML) INJECTION
Status: DISCONTINUED | OUTPATIENT
Start: 2019-09-18 | End: 2019-09-18 | Stop reason: HOSPADM

## 2019-09-18 RX ORDER — GLYCOPYRROLATE 0.2 MG/ML
INJECTION INTRAMUSCULAR; INTRAVENOUS
Status: DISCONTINUED | OUTPATIENT
Start: 2019-09-18 | End: 2019-09-18

## 2019-09-18 RX ORDER — SODIUM CHLORIDE 9 MG/ML
INJECTION, SOLUTION INTRAVENOUS CONTINUOUS
Status: DISCONTINUED | OUTPATIENT
Start: 2019-09-18 | End: 2019-09-18 | Stop reason: HOSPADM

## 2019-09-18 RX ORDER — LIDOCAINE HCL/PF 100 MG/5ML
SYRINGE (ML) INTRAVENOUS
Status: DISCONTINUED | OUTPATIENT
Start: 2019-09-18 | End: 2019-09-18

## 2019-09-18 RX ORDER — PROPOFOL 10 MG/ML
VIAL (ML) INTRAVENOUS
Status: DISCONTINUED | OUTPATIENT
Start: 2019-09-18 | End: 2019-09-18

## 2019-09-18 RX ADMIN — SODIUM CHLORIDE: 0.9 INJECTION, SOLUTION INTRAVENOUS at 09:09

## 2019-09-18 RX ADMIN — GLYCOPYRROLATE 0.2 MG: 0.2 INJECTION, SOLUTION INTRAMUSCULAR; INTRAVENOUS at 10:09

## 2019-09-18 RX ADMIN — SODIUM CHLORIDE: 0.9 INJECTION, SOLUTION INTRAVENOUS at 10:09

## 2019-09-18 RX ADMIN — LIDOCAINE HYDROCHLORIDE 60 MG: 20 INJECTION, SOLUTION INTRAVENOUS at 10:09

## 2019-09-18 RX ADMIN — PROPOFOL 70 MG: 10 INJECTION, EMULSION INTRAVENOUS at 10:09

## 2019-09-18 RX ADMIN — PROPOFOL 150 MCG/KG/MIN: 10 INJECTION, EMULSION INTRAVENOUS at 10:09

## 2019-09-18 NOTE — TRANSFER OF CARE
"Anesthesia Transfer of Care Note    Patient: Rosanna Live    Procedure(s) Performed: Procedure(s) (LRB):  EGD (ESOPHAGOGASTRODUODENOSCOPY) (N/A)    Patient location: GI    Anesthesia Type: general    Transport from OR: Transported from OR on 2-3 L/min O2 by NC with adequate spontaneous ventilation    Post pain: adequate analgesia    Post assessment: no apparent anesthetic complications and tolerated procedure well    Post vital signs: stable    Level of consciousness: awake and alert    Nausea/Vomiting: no nausea/vomiting    Complications: none    Transfer of care protocol was followed      Last vitals:   Visit Vitals  /72   Pulse 91   Temp 36.7 °C (98.1 °F)   Resp 18   Ht 5' 6" (1.676 m)   Wt 91.6 kg (202 lb)   LMP  (LMP Unknown)   SpO2 98%   Breastfeeding? No   BMI 32.60 kg/m²     "

## 2019-09-18 NOTE — [employer] PATIENT INSTRUCTIONS
Discharge Summary/Instructions after an Endoscopic Procedure  Patient Name: Rosanna Live  Patient MRN: [id number]  Patient   YOB: 1960 Wednesday, September 18, 2019  Carlos Manuel Barber MD  RESTRICTIONS:  During your procedure today, you received medications for sedation.  These   medications may affect your judgment, balance and coordination.  Therefore,   for 24 hours, you have the following restrictions:   - DO NOT drive a car, operate machinery, make legal/financial decisions,   sign important papers or drink alcohol.    ACTIVITY:  Today: no heavy lifting, straining or running due to procedural   sedation/anesthesia.  The following day: return to full activity including work.  DIET:  Eat and drink normally unless instructed otherwise.     TREATMENT FOR COMMON SIDE EFFECTS:  - Mild abdominal pain, nausea, belching, bloating or excessive gas:  rest,   eat lightly and use a heating pad.  - Sore Throat: treat with throat lozenges and/or gargle with warm salt   water.  - Because air was used during the procedure, expelling large amounts of air   from your rectum or belching is normal.  - If a bowel prep was taken, you may not have a bowel movement for 1-3 days.    This is normal.  SYMPTOMS TO WATCH FOR AND REPORT TO YOUR PHYSICIAN:  1. Abdominal pain or bloating, other than gas cramps.  2. Chest pain.  3. Back pain.  4. Signs of infection such as: chills or fever occurring within 24 hours   after the procedure.  5. Rectal bleeding, which would show as bright red, maroon, or black stools.   (A tablespoon of blood from the rectum is not serious, especially if   hemorrhoids are present.)  6. Vomiting.  7. Weakness or dizziness.  GO DIRECTLY TO THE NEAREST EMERGENCY ROOM IF YOU HAVE ANY OF THE FOLLOWING:      Difficulty breathing              Chills and/or fever over 101 F   Persistent vomiting and/or vomiting blood   Severe abdominal pain   Severe chest pain   Black, tarry stools   Bleeding- more than  one tablespoon   Any other symptom or condition that you feel may need urgent attention  Your doctor recommends these additional instructions:  If any biopsies were taken, your doctors clinic will contact you in 1 to 2   weeks with any results.  - Discharge patient to home.   - Await pathology results.   - Telephone endoscopist for pathology results in 2 weeks.   - Repeat upper endoscopy in 3 years for surveillance based on pathology   results.   - The findings and recommendations were discussed with the patient.  For questions, problems or results please call your physician - Carlos Manuel Barber MD at Work:  [phone].  [facility], EMERGENCY ROOM PHONE NUMBER: [phone]  IF A COMPLICATION OR EMERGENCY SITUATION ARISES AND YOU ARE UNABLE TO REACH   YOUR PHYSICIAN - GO DIRECTLY TO THE EMERGENCY ROOM.  Carlos Manuel Barber MD  9/18/2019 10:51:26 AM  This report has been verified and signed electronically.  [employer]

## 2019-09-18 NOTE — ANESTHESIA POSTPROCEDURE EVALUATION
Anesthesia Post Evaluation    Patient: Rosanna Live    Procedure(s) Performed: Procedure(s) (LRB):  EGD (ESOPHAGOGASTRODUODENOSCOPY) (N/A)    Final Anesthesia Type: general  Patient location during evaluation: GI PACU  Patient participation: Yes- Able to Participate  Level of consciousness: awake and alert, awake and oriented  Post-procedure vital signs: reviewed and stable  Pain management: adequate  Airway patency: patent  PONV status at discharge: No PONV  Anesthetic complications: no      Cardiovascular status: blood pressure returned to baseline, stable and hemodynamically stable  Respiratory status: unassisted, spontaneous ventilation and room air  Hydration status: euvolemic  Follow-up not needed.          Vitals Value Taken Time   /70 9/18/2019 11:19 AM   Temp 36.7 °C (98.1 °F) 9/18/2019 10:53 AM   Pulse 67 9/18/2019 11:19 AM   Resp 19 9/18/2019 11:19 AM   SpO2 96 % 9/18/2019 11:19 AM         Event Time     Out of Recovery 11:20:40          Pain/Shadia Score: Shadia Score: 10 (9/18/2019 11:08 AM)

## 2019-09-18 NOTE — H&P
Short Stay Endoscopy History and Physical    PCP - Geena Roman MD     Procedure - EGD  ASA - per anesthesia  Mallampati - per anesthesia  History of Anesthesia problems - no  Family history Anesthesia problems -  no   Plan of anesthesia - General    HPI:  59 year old female with a history of BE, HE, and Nissen fundoplication in 2016 who presents for an EGD.    ROS:  Constitutional: No fevers, chills, No weight loss  CV: No chest pain  Pulm: No cough, No shortness of breath  Ophtho: No vision changes  GI: see HPI  Derm: No rash    Medical History:  has a past medical history of Allergy, Brito's esophagus, Basal cell cancer, Cataract, Diverticulosis, Edema, Hiatal hernia (1/31/2017), Obesity, Rectovaginal fistula (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    Surgical History:  has a past surgical history that includes Cholecystectomy; Mohs' procedure of face; Colonoscopy; Esophagogastroduodenoscopy; Laparoscopic Nissen fundoplication (2/15/16); Inguinal hernia repair; Umbilical hernia repair; and RECTO VAGINAL FISTULA (2011, 2012).    Family History: family history includes Arthritis in her father; Cataracts in her father and mother; Diabetes in her mother; Glaucoma in her father and mother; Heart disease in her father, maternal aunt, and maternal grandmother; Hyperlipidemia in her father; Hypertension in her mother; Miscarriages / Stillbirths in her mother; No Known Problems in her brother, maternal grandfather, maternal uncle, paternal aunt, paternal grandfather, paternal grandmother, paternal uncle, and sister.. Otherwise no colon cancer, inflammatory bowel disease, or GI malignancies.    Social History:  reports that she quit smoking about 36 years ago. She has a 1.25 pack-year smoking history. She has never used smokeless tobacco. She reports that she drinks about 0.6 oz of alcohol per week. She reports that she does not use drugs.    Review of patient's allergies indicates:   Allergen Reactions     Penicillins Rash       Medications:   Medications Prior to Admission   Medication Sig Dispense Refill Last Dose    hyoscyamine (ANASPAZ,LEVSIN) 0.125 mg Tab Take 125 mcg by mouth every 4 (four) hours as needed.       naproxen sodium (ANAPROX) 220 MG tablet Take 220 mg by mouth as needed.          Physical Exam:    Vital Signs:   Vitals:    09/18/19 0929   BP: 122/67   Pulse: 77   Resp: 18   Temp: 97.5 °F (36.4 °C)       General Appearance: Well appearing in no acute distress  Eyes:    No scleral icterus  ENT: Neck supple, Lips, mucosa, and tongue normal; teeth and gums normal  Lungs: CTA anteriorly  Heart:  Regular rate, S1, S2 normal, no murmurs heard.  Abdomen: Soft, non tender, non distended with normal bowel sounds. No hepatosplenomegaly, ascites, or mass.  Extremities: No edema  Skin: No rash    Labs:  Lab Results   Component Value Date    WBC 6.72 08/21/2018    HGB 13.1 08/21/2018    HCT 40.0 08/21/2018     (H) 08/21/2018    CHOL 189 08/21/2018    TRIG 75 08/21/2018    HDL 53 08/21/2018    ALT 13 08/21/2018    AST 16 08/21/2018     08/21/2018    K 3.9 08/21/2018     08/21/2018    CREATININE 0.7 08/21/2018    BUN 13 08/21/2018    CO2 23 08/21/2018    TSH 0.919 08/21/2018    INR 1.5 (H) 12/14/2016    HGBA1C 5.6 08/21/2018       I have explained the risks and benefits of endoscopy procedures to the patient including but not limited to bleeding, perforation, infection, and death.      Kayla Collins M.D.  Gastroenterology Fellow, PGY-VI  Pager: [phone]  [facility]

## 2019-09-18 NOTE — ANESTHESIA PREPROCEDURE EVALUATION
09/18/2019  Rosanna Live is a 59 y.o., female.    Patient Active Problem List   Diagnosis    Rectovaginal fistula    Brito's esophagus without dysplasia    Vitamin D imbalance    Encounter for long-term (current) use of other medications    History of Nissen fundoplication    Low hemoglobin and low hematocrit    Chest pain    Brito's esophagus    Epigastric pain    Bloating symptom    Food intolerance in adult    Hiatal hernia     Past Surgical History:   Procedure Laterality Date    CHOLECYSTECTOMY      COLONOSCOPY      COLONOSCOPY N/A 8/31/2015    [facility]formed by [facility] ENDO (4TH FLR)    ESOPHAGOGASTRODUODENOSCOPY      ESOPHAGOGASTRODUODENOSCOPY (EGD) N/A 1/13/2017    Performed by [facility] ENDO (4TH FLR)    ESOPHAGOGASTRODUODENOSCOPY (EGD) N/A 8/31/2015    Performed by [facility] Manuel Barber [facility] at [facility] ENDO (4TH FLR)    FUNDOPLICATION-NISSEN-LAPAROSCOPIC N/A 2/15/2016    Performed by [facility]et Gallagher [facility] at [facility] OR 2ND FLR    INGUINAL HERNIA REPAIR      LAPAROSCOPIC NISSEN FUNDOPLICATION  2/15/16    MANOMETRY-ESOPHAGEAL N/A 12/16/2015    Performed by Jose Luis Culver MD at [facility] ENDO (4TH FLR)    Mohs' procedure of face      RECTO VAGINAL FISTULA  2011, 2012    REPAIR HERNIA LAPAROSCOPIC HIATAL N/A 2/15/2016    Performed by [facility] Gallagher [facility] at [facility] OR 2ND FLR    UMBILICAL HERNIA REPAIR       Past Medical History:   Diagnosis Date    Allergy     Brito's esophagus     Basal cell cancer     Cataract     Diverticulosis     Edema     chronic right foot     Hiatal hernia 1/31/2017    Obesity     Rectovaginal fistula 6/28/2012    Vitamin D deficiency disease          Anesthesia Evaluation    I have reviewed the Patient Summary Reports.    I have reviewed the Nursing Notes.   I have reviewed the Medications.     Review of  Systems      Physical Exam  General:  Well nourished    Airway/Jaw/Neck:  Airway Findings: Mouth Opening: Normal Tongue: Normal  General Airway Assessment: Adult  Mallampati: II  Improves to I with phonation.  TM Distance: Normal, at least 6 cm  Jaw/Neck Findings:  Neck ROM: Normal ROM  Neck Findings:     Eyes/Ears/Nose:  Eyes/Ears/Nose Findings:    Dental:  Dental Findings: In tact   Chest/Lungs:  Chest/Lungs Findings: Clear to auscultation, Normal Respiratory Rate     Heart/Vascular:  Heart Findings: Rate: Normal  Rhythm: Regular Rhythm  Vascular Findings:     Abdomen:  Abdomen Findings:  Normal       Mental Status:  Mental Status Findings:  Cooperative, Alert and Oriented         Anesthesia Plan  Type of Anesthesia, risks & benefits discussed:  Anesthesia Type:  general  Patient's Preference:   Intra-op Monitoring Plan: standard ASA monitors  Intra-op Monitoring Plan Comments:   Post Op Pain Control Plan:   Post Op Pain Control Plan Comments:   Induction:   IV  Beta Blocker:  Patient is not currently on a Beta-Blocker (No further documentation required).       Informed Consent: Patient understands risks and agrees with Anesthesia plan.  Questions answered. Anesthesia consent signed with patient.  ASA Score: 2     Day of Surgery Review of History & Physical: I have interviewed and examined the patient. I have reviewed the patient's H&P dated:            Ready For Surgery From Anesthesia Perspective.

## 2019-09-25 ENCOUNTER — TELEPHONE (OUTPATIENT)
Dept: ENDOSCOPY | Facility: HOSPITAL | Age: 59
End: 2019-09-25

## 2019-10-02 DIAGNOSIS — K22.70 BARRETT'S ESOPHAGUS WITHOUT DYSPLASIA: ICD-10-CM

## 2019-10-02 DIAGNOSIS — K21.9 GASTROESOPHAGEAL REFLUX DISEASE WITHOUT ESOPHAGITIS: Primary | ICD-10-CM

## 2019-10-02 RX ORDER — PANTOPRAZOLE SODIUM 40 MG/1
40 TABLET, DELAYED RELEASE ORAL
Qty: 90 TABLET | Refills: 3 | Status: SHIPPED | OUTPATIENT
Start: 2019-10-02 | End: 2020-09-22

## 2019-10-03 ENCOUNTER — TELEPHONE (OUTPATIENT)
Dept: GASTROENTEROLOGY | Facility: CLINIC | Age: 59
End: 2019-10-03

## 2019-10-03 NOTE — TELEPHONE ENCOUNTER
Called and relayed EGD results to pt.  Pt verbalized understanding and appreciated the call.  Confirmed with pt prescription sent to correct pharmacy.  Notified pt Dr. Barber would like to see her in clinic and will call her back with date and time.  Pt states she is a retired  and her schedule is very flexible.  Pt appreciated the call.

## 2019-11-12 ENCOUNTER — OFFICE VISIT (OUTPATIENT)
Dept: URGENT CARE | Facility: CLINIC | Age: 59
End: 2019-11-12
Payer: COMMERCIAL

## 2019-11-12 VITALS
OXYGEN SATURATION: 96 % | DIASTOLIC BLOOD PRESSURE: 68 MMHG | HEIGHT: 66 IN | HEART RATE: 90 BPM | RESPIRATION RATE: 18 BRPM | SYSTOLIC BLOOD PRESSURE: 125 MMHG | BODY MASS INDEX: 32.47 KG/M2 | WEIGHT: 202 LBS | TEMPERATURE: 98 F

## 2019-11-12 DIAGNOSIS — J32.9 SINUSITIS, UNSPECIFIED CHRONICITY, UNSPECIFIED LOCATION: Primary | ICD-10-CM

## 2019-11-12 LAB
CTP QC/QA: YES
S PYO RRNA THROAT QL PROBE: NEGATIVE

## 2019-11-12 PROCEDURE — 3008F BODY MASS INDEX DOCD: CPT | Mod: CPTII,S$GLB,, | Performed by: NURSE PRACTITIONER

## 2019-11-12 PROCEDURE — 3008F PR BODY MASS INDEX (BMI) DOCUMENTED: ICD-10-PCS | Mod: CPTII,S$GLB,, | Performed by: NURSE PRACTITIONER

## 2019-11-12 PROCEDURE — 99214 OFFICE O/P EST MOD 30 MIN: CPT | Mod: S$GLB,,, | Performed by: NURSE PRACTITIONER

## 2019-11-12 PROCEDURE — 87880 POCT RAPID STREP A: ICD-10-PCS | Mod: QW,S$GLB,, | Performed by: NURSE PRACTITIONER

## 2019-11-12 PROCEDURE — 99214 PR OFFICE/OUTPT VISIT, EST, LEVL IV, 30-39 MIN: ICD-10-PCS | Mod: S$GLB,,, | Performed by: NURSE PRACTITIONER

## 2019-11-12 PROCEDURE — 87880 STREP A ASSAY W/OPTIC: CPT | Mod: QW,S$GLB,, | Performed by: NURSE PRACTITIONER

## 2019-11-12 RX ORDER — AZITHROMYCIN 250 MG/1
TABLET, FILM COATED ORAL
Qty: 6 TABLET | Refills: 0 | Status: SHIPPED | OUTPATIENT
Start: 2019-11-12 | End: 2019-11-22

## 2019-11-12 NOTE — PATIENT INSTRUCTIONS
"Please follow up with your Primary care provider within 2-5 days if your signs and symptoms have not resolved or worsen.  The usual course of cold symptoms are 10-14 days.     If your condition worsens or fails to improve we recommend that you receive another evaluation at the emergency room immediately or contact your primary medical clinic to discuss your concerns.     You must understand that you have received an Urgent Care treatment only and that you may be released before all of your medical problems are known or treated.   You, the patient, will arrange for follow up care as instructed.     Tylenol or Ibuprofen can also be used as directed for pain/fever unless you have an allergy to them or medical condition such as stomach ulcers, kidney or liver disease or blood thinners etc for which you should not be taking these type of medications.     Take over the counter cough medication as directed as needed for cough.  You should avoid medications with pseudoephedrine or phenylephrine (any medication with "D") if you have high blood pressure as this can cause an elevation in your blood pressure. Instead consider Corcidin HBP as needed to prevent an elevated blood pressure.     Natural remedies of symptoms (as needed) include humidification, saline nasal sprays, and/or steamy showers.  Increase fluids, warm tea with honey, cough drops as needed.  You may also use salt water gargles for sore throat.    IF you received a steroid shot today - As discussed, this can elevate your blood pressure, elevate your blood sugar, water weight gain, nervous energy, redness to the face and dimpling of the skin at the injection site.       You have been given an antibiotic to treat your condition today.  Please complete the antibiotic as directed on the bottle.     As with any antibiotics, use probiotics and/or high culture yogurt about 2 hours apart from the antibiotic and about 1 week after the antibiotic to replace the gut parvin " lost with antibiotic use.      If you are female and on BCP use additional methods to prevent pregnancy while on antibiotics and for one cycle after.         Sinusitis (Antibiotic Treatment)    The sinuses are air-filled spaces within the bones of the face. They connect to the inside of the nose. Sinusitis is an inflammation of the tissue lining the sinus cavity. Sinus inflammation can occur during a cold. It can also be due to allergies to pollens and other particles in the air. Sinusitis can cause symptoms of sinus congestion and fullness. A sinus infection causes fever, headache and facial pain. There is often green or yellow drainage from the nose or into the back of the throat (post-nasal drip). You have been given antibiotics to treat this condition.  Home care:  · Take the full course of antibiotics as instructed. Do not stop taking them, even if you feel better.  · Drink plenty of water, hot tea, and other liquids. This may help thin mucus. It also may promote sinus drainage.  · Heat may help soothe painful areas of the face. Use a towel soaked in hot water. Or,  the shower and direct the hot spray onto your face. Using a vaporizer along with a menthol rub at night may also help.   · An expectorant containing guaifenesin may help thin the mucus and promote drainage from the sinuses.  · Over-the-counter decongestants may be used unless a similar medicine was prescribed. Nasal sprays work the fastest. Use one that contains phenylephrine or oxymetazoline. First blow the nose gently. Then use the spray. Do not use these medicines more often than directed on the label or symptoms may get worse. You may also use tablets containing pseudoephedrine. Avoid products that combine ingredients, because side effects may be increased. Read labels. You can also ask the pharmacist for help. (NOTE: Persons with high blood pressure should not use decongestants. They can raise blood  pressure.)  · Over-the-counter antihistamines may help if allergies contributed to your sinusitis.    · Do not use nasal rinses or irrigation during an acute sinus infection, unless told to by your health care provider. Rinsing may spread the infection to other sinuses.  · Use acetaminophen or ibuprofen to control pain, unless another pain medicine was prescribed. (If you have chronic liver or kidney disease or ever had a stomach ulcer, talk with your doctor before using these medicines. Aspirin should never be used in anyone under 18 years of age who is ill with a fever. It may cause severe liver damage.)  · Don't smoke. This can worsen symptoms.  Follow-up care  Follow up with your healthcare provider or our staff if you are not improving within the next week.  When to seek medical advice  Call your healthcare provider if any of these occur:  · Facial pain or headache becoming more severe  · Stiff neck  · Unusual drowsiness or confusion  · Swelling of the forehead or eyelids  · Vision problems, including blurred or double vision  · Fever of 100.4ºF (38ºC) or higher, or as directed by your healthcare provider  · Seizure  · Breathing problems  · Symptoms not resolving within 10 days  Date Last Reviewed: 4/13/2015  © 2174-1893 [employer] [address] [place] [address], [place] [address]. All rights reserved. This information is not intended as a substitute for professional medical care. Always follow your healthcare professional's instructions.

## 2019-11-12 NOTE — PROGRESS NOTES
"Subjective:       Patient ID: Rosanna Live is a 59 y.o. female.    Vitals:  height is 5' 6" (1.676 m) and weight is 91.6 kg (202 lb). Her temperature is 98.1 °F (36.7 °C). Her blood pressure is 125/68 and her pulse is 90. Her respiration is 18 and oxygen saturation is 96%.     Chief Complaint: Sore Throat    This is a 59 y.o. female who presents today with a chief complaint of sore throat and sinus for 2 days.      Sore Throat    This is a new problem. The current episode started in the past 7 days. The problem has been gradually worsening. There has been no fever. The pain is at a severity of 8/10. The pain is moderate. Associated symptoms include congestion and trouble swallowing. Pertinent negatives include no coughing. She has tried nothing for the symptoms.   Sinus Problem   This is a new problem. The current episode started in the past 7 days. The problem has been gradually worsening since onset. There has been no fever. Her pain is at a severity of 0/10. She is experiencing no pain. Associated symptoms include congestion, sinus pressure and a sore throat. Pertinent negatives include no coughing. Past treatments include oral decongestants. The treatment provided no relief.       HENT: Positive for congestion, postnasal drip, sinus pressure, sore throat and trouble swallowing.    Respiratory: Negative for cough.    Neurological: Positive for dizziness.       Objective:      Physical Exam   Constitutional: She is oriented to person, place, and time. She appears well-developed and well-nourished. She is cooperative.  Non-toxic appearance. She does not have a sickly appearance. She does not appear ill. No distress.   HENT:   Head: Normocephalic and atraumatic.   Right Ear: Hearing, tympanic membrane, external ear and ear canal normal.   Left Ear: Hearing, tympanic membrane, external ear and ear canal normal.   Nose: Mucosal edema and rhinorrhea present. No nasal deformity. No epistaxis. Right sinus " exhibits maxillary sinus tenderness and frontal sinus tenderness. Left sinus exhibits maxillary sinus tenderness and frontal sinus tenderness.   Mouth/Throat: Uvula is midline, oropharynx is clear and moist and mucous membranes are normal. No trismus in the jaw. Normal dentition. No uvula swelling. No oropharyngeal exudate, posterior oropharyngeal edema or posterior oropharyngeal erythema.   Eyes: Conjunctivae and lids are normal. No scleral icterus.   Neck: Trachea normal, full passive range of motion without pain and phonation normal. Neck supple. No neck rigidity. No edema and no erythema present.   Cardiovascular: Normal rate, regular rhythm, normal heart sounds, intact distal pulses and normal pulses.   Pulmonary/Chest: Effort normal and breath sounds normal. No respiratory distress. She has no decreased breath sounds. She has no rhonchi.   Musculoskeletal: Normal range of motion. She exhibits no edema or deformity.   Lymphadenopathy:     She has no cervical adenopathy.        Right cervical: No superficial cervical, no deep cervical and no posterior cervical adenopathy present.       Left cervical: No superficial cervical, no deep cervical and no posterior cervical adenopathy present.   Neurological: She is alert and oriented to person, place, and time. She exhibits normal muscle tone. Coordination normal.   Skin: Skin is warm, dry, intact, not diaphoretic and not pale.   Psychiatric: She has a normal mood and affect. Her speech is normal and behavior is normal. Judgment and thought content normal. Cognition and memory are normal.   Nursing note and vitals reviewed.        Assessment:       1. Sinusitis, unspecified chronicity, unspecified location        Plan:       Results for orders placed or performed in visit on 11/12/19   POCT rapid strep A   Result Value Ref Range    Rapid Strep A Screen Negative Negative     Acceptable Yes        Sinusitis, unspecified chronicity, unspecified location  -      POCT rapid strep A  -     azithromycin (ZITHROMAX Z-ANABELL) 250 MG tablet; Take 2 tablets (500 mg) on  Day 1,  followed by 1 tablet (250 mg) once daily on Days 2 through 5.  Dispense: 6 tablet; Refill: 0

## 2019-11-21 ENCOUNTER — OFFICE VISIT (OUTPATIENT)
Dept: GASTROENTEROLOGY | Facility: CLINIC | Age: 59
End: 2019-11-21
Payer: COMMERCIAL

## 2019-11-21 VITALS
DIASTOLIC BLOOD PRESSURE: 79 MMHG | RESPIRATION RATE: 16 BRPM | WEIGHT: 197.31 LBS | SYSTOLIC BLOOD PRESSURE: 124 MMHG | HEART RATE: 80 BPM | BODY MASS INDEX: 31.71 KG/M2 | HEIGHT: 66 IN

## 2019-11-21 DIAGNOSIS — K59.1 FUNCTIONAL DIARRHEA: ICD-10-CM

## 2019-11-21 DIAGNOSIS — K22.70 BARRETT'S ESOPHAGUS WITHOUT DYSPLASIA: Primary | ICD-10-CM

## 2019-11-21 DIAGNOSIS — Z98.890 HISTORY OF NISSEN FUNDOPLICATION: ICD-10-CM

## 2019-11-21 PROCEDURE — 99213 PR OFFICE/OUTPT VISIT, EST, LEVL III, 20-29 MIN: ICD-10-PCS | Mod: S$GLB,,, | Performed by: INTERNAL MEDICINE

## 2019-11-21 PROCEDURE — 99999 PR PBB SHADOW E&M-EST. PATIENT-LVL III: ICD-10-PCS | Mod: PBBFAC,,, | Performed by: INTERNAL MEDICINE

## 2019-11-21 PROCEDURE — 3008F BODY MASS INDEX DOCD: CPT | Mod: CPTII,S$GLB,, | Performed by: INTERNAL MEDICINE

## 2019-11-21 PROCEDURE — 99213 OFFICE O/P EST LOW 20 MIN: CPT | Mod: S$GLB,,, | Performed by: INTERNAL MEDICINE

## 2019-11-21 PROCEDURE — 99999 PR PBB SHADOW E&M-EST. PATIENT-LVL III: CPT | Mod: PBBFAC,,, | Performed by: INTERNAL MEDICINE

## 2019-11-21 PROCEDURE — 3008F PR BODY MASS INDEX (BMI) DOCUMENTED: ICD-10-PCS | Mod: CPTII,S$GLB,, | Performed by: INTERNAL MEDICINE

## 2019-11-21 NOTE — PROGRESS NOTES
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 Diagnoses of History of Nissen fundoplication and Functional diarrhea were also pertinent to this visit.    PCP:   Geena Roman   [address] / [place] [address]    Referring MD:  Geena Roman Md  [address] [place] [address]    Initial History of Present Illness (HPI):  This is a 59 y.o. female with a history of chronic abdominal complaints, history of reflux status post Nissen fundoplication in 2016 who is presenting for follow-up evaluation of chronic symptoms of abdominal pain, nausea, and diarrhea.     Prior history (11/2018):  The patient follow by Dr. Barber. She had chronic epigastric abdominal pain and EGD in the past consistent with short segment Brito's, with 6cm hiatal hernia, was on a PPI, and eventually had a Nissen Fundoplication in 2016 for symptomatic hiatal hernia, although she thinks that she might have reversed it while lifting her father after the procedure. Repeat EGD in 2017 with HH of 4 cm. She has been off PPI. She was last seen by Dr. Barber in 7/2018, and had no complaints then. She has a prior history of CCK.     During that visit, she had complaints of continued abdominal pain. She stated that for the prior year she had intermittent symptoms of epigastric abdominal pain associated with cramping and diarrhea. She stated that she did not notice any provoking factors, and happen ~4-5 /month, lasting 2 days every time. She denies any symptoms in between these episodes. She stated that the pain and diarrhea resolve after she takes Levsin. She does not like medications and that's why she waits 2 days before she takes the medication. She endorses continuous bloating. She states that she did not lose weight, and her appetite has not changed. She states that her symptoms do not wake her from sleep,  although they seem to be the worst right after she wakes up. She denies blood or mucous associated with the stool.     On last visit, we explained to the patient that she most likely has IBS.  We recommended that she uses Levsin for her symptoms more frequently.  She was to report to us if her symptoms have not been improving, however she was waiting for this appointment.     8/12/19  Today the patient states that she continues to have significant abdominal pain, mostly in the epigastric area with some discomfort in the lower abdomen.  She describes some cramping, with diarrhea right after she eats.  She describes the stool to be watery mixed with mucus.  She denies associated blood.  She denies waking up at night for bowel movement.  She states that she feels nauseated as soon as she wakes up in the morning, however the nausea does not wake her up from sleep.  She reports some aversion to food, and she lost about 6 lb in the past year.  Associated symptoms with nausea are excessive sweating and feeling jittery.  She denies vomiting. Reports that symptoms are almost daily, and are associated with periods of anxiety.  She does not use Levsin often, and she thinks that it is not working as well as it had been before.  She uses NSAIDs very rarely.  She did not try any dietary restrictions before, and she is not on any medications currently.  She is most concerned that her hiatal hernia repair is loosened up right now, and requests an EGD evaluation.  She denies significant reflux, or changes of dysphagia/odynophagia.  Prior EGD/colonoscopy as below.  She denies family history of any cancers.        Abdominal pain - no  Reflux - no  Dysphagia - no   Bowel habits - normal  GI bleeding - none  NSAID usage - none    Interval HPI 11/21/2019:  Today she presents for follow up after EGD. Pathology showed Brito's and she was started on 40mg Pantoprazole daily. Since then she has noticed much improvement to her  abdominal/epigastric pain and nausea. She continues to have intermittent abdominal pain and diarrhea but has noticed much improvement of those symptoms with IBGuard that she takes PRN for the past 3 months.       ROS:  Constitutional: No fevers, chills, + weight loss d/t low carb diet  ENT:  No heartburn no dysphagia no odynophagia no hoarseness  CV: No chest pain, + intermittent palpitation, seeing PCP tomorrow  Pulm: No cough, No shortness of breath, no wheezing  Ophtho: No vision changes  GI: see HPI  Derm: No rash, no itching  Heme: No lymphadenopathy, No easy bruising  MSK: + L knee arthritis  : No dysuria, No hematuria  Endo: No hot or cold intolerance  Neuro: No syncope, No seizure, no strokes  Psych: No uncontrolled anxiety, No uncontrolled depression    Medical History:  has a past medical history of Allergy, Brito's esophagus, Basal cell cancer, Cataract, Diverticulosis, Edema, Hiatal hernia (1/31/2017), IBS (irritable bowel syndrome), Obesity, Rectovaginal fistula (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    Surgical History:  has a past surgical history that includes Cholecystectomy; Mohs' procedure of face; Colonoscopy; Esophagogastroduodenoscopy; Laparoscopic Nissen fundoplication (2/15/16); Inguinal hernia repair; Umbilical hernia repair; RECTO VAGINAL FISTULA (2011, 2012); and Esophagogastroduodenoscopy (N/A, 9/18/2019).    Family History: family history includes Arthritis in her father; Cataracts in her father and mother; Diabetes in her mother; Glaucoma in her father and mother; Heart disease in her father, maternal aunt, and maternal grandmother; Hyperlipidemia in her father; Hypertension in her mother; Miscarriages / Stillbirths in her mother; No Known Problems in her brother, maternal grandfather, maternal uncle, paternal aunt, paternal grandfather, paternal grandmother, paternal uncle, and sister..     Social History:  reports that she quit smoking about 36 years ago. She has a 1.25  "pack-year smoking history. She has never used smokeless tobacco. She reports that she drinks about 1.0 standard drinks of alcohol per week. She reports that she does not use drugs.    Review of patient's allergies indicates:   Allergen Reactions    Penicillins Rash       Medication List with Changes/Refills   Current Medications    AZITHROMYCIN (ZITHROMAX Z-ANABELL) 250 MG TABLET    Take 2 tablets (500 mg) on  Day 1,  followed by 1 tablet (250 mg) once daily on Days 2 through 5.    HYOSCYAMINE (ANASPAZ,LEVSIN) 0.125 MG TAB    Take 125 mcg by mouth every 4 (four) hours as needed.    NAPROXEN SODIUM (ANAPROX) 220 MG TABLET    Take 220 mg by mouth as needed.    PANTOPRAZOLE (PROTONIX) 40 MG TABLET    Take 1 tablet (40 mg total) by mouth before breakfast. Take one pill every morning 45 minutes before breakfast in the morning.         Objective Findings:    Vital Signs:  Blood Pressure 124/79 (BP Location: Right arm, Patient Position: Sitting)   Pulse 80   Respiration 16   Height 5' 6" (1.676 m)   Weight 89.5 kg (197 lb 5 oz)   Last Menstrual Period  (LMP Unknown)   Body Mass Index 31.85 kg/m²   Body mass index is 31.85 kg/m².    Physical Exam:  General Appearance: Well appearing in no acute distress  Eyes:    No scleral icterus  ENT:  No lesions or masses   Lungs: CTA bilaterally, no wheezes, no rhonchi, no rales  Heart:  S1, S2 normal, no murmurs heard  Abdomen:  Non distended, soft, no guarding, no rebound, no tenderness, no appreciated ascites, no bruits, no hepatosplenomegaly,  No CVA tenderness, no appreciated hernias  Musculoskeletal:  No major joint deformities  Skin: No petechiae or rash on exposed skin areas  Neurologic:  Alert and oriented x4  Psychiatric:  Normal speech mentation and affect    Labs:  Lab Results   Component Value Date    WBC 6.72 08/21/2018    HGB 13.1 08/21/2018    HCT 40.0 08/21/2018     (H) 08/21/2018    CHOL 189 08/21/2018    TRIG 75 08/21/2018    HDL 53 08/21/2018    ALT 13 " 08/21/2018    AST 16 08/21/2018     08/21/2018    K 3.9 08/21/2018     08/21/2018    CREATININE 0.7 08/21/2018    BUN 13 08/21/2018    CO2 23 08/21/2018    TSH 0.919 08/21/2018    INR 1.5 (H) 12/14/2016    HGBA1C 5.6 08/21/2018           Imaging:    Endoscopy:    EGD 9/2019  - Normal examined duodenum.                        - Erythematous mucosa in the gastric body, antrum                         and prepyloric region of the stomach.                        - 1 cm hiatal hernia. Biopsied. C0M1 few very small                         oval islands bxed. No erosive esophagitis and no                         other lesions seen with NBI or white light  -Repeat in 3 years for gill's surveillance  -Path: 1. Stomach, biopsy:  - Gastric antral and oxyntic mucosa with findings of reactive gastropathy  - Routine H&E stain is negative for Helicobacter pylori  Comment: The specimen shows foveolar hyperplasia with associated lamina propria edema and vascular ectasia,  consistent with reactive gastropathy. This pattern of injury is often seen in the setting of bile reflux, alcohol use,  stress ulceration and NSAID/aspirin use. There is no evidence of atrophy or intestinal metaplasia. The specimen is  negative for dysplasia or malignancy.    2. Distal esophagus (oval islands), biopsy:  - Squamous and gastric cardia mucosa with minimal focus of Gill's esophagus, see comment    Comment: The specimen shows specialized columnar epithelium with goblet cell metaplasia, consistent with  Gill's esophagus. Associated gastric and squamous mucosa exhibits features of chronic gastric carditis and mild  reflux esophagitis. There is no evidence of dysplasia or malignancy.      Colonoscopy 2015  Impression:           - The examined portion of the ileum was normal.                        - Non-bleeding internal hemorrhoids.                        - Diverticulosis in the sigmoid colon and in the                          descending colon.                        - The examination was otherwise normal.                        - Biopsies were taken with a cold forceps from the                         cecum, ascending colon, transverse colon,                         descending colon, sigmoid colon and rectum for                         evaluation of microscopic colitis.      -Path: FRAGMENTS OF NONNEOPLASTIC COLONIC MUCOSA WITH NO ACTIVE INFLAMMATION, EVIDENCE  OF MICROSCOPIC COLITIS OR DYSPLASIA IDENTIFIED.      Medical Decision Making:        Assessment:  Ms. chambers is a 59yr female seen today for  1. Brito's esophagus without dysplasia    2. History of Nissen fundoplication    3. Functional diarrhea         Recommendations:  1. Continue 40mg Pantoprazole daily 45 min before first protein containing meal of the day  2. If after 2-3 months she wishes to trial lower dose of 20mg, she will message for a new prescription.  3. Seeing PCP tomorrow for palpitations and basic labs  4. Instructed pt PCP can obtain yearly PPI labs of Vit D, Vit B12, Magesium, and Kidney function  5. Repeat EGD in 3 yrs for surveillance    No follow-ups on file.      Order summary:   no new orders placed      Thank you for allowing me to participate in the care of Rosanna Chambers    SARA Oliver

## 2019-11-21 NOTE — LETTER
November 21, 2019      Geena Roman MD  [address]  [facility] [address]           [facility]  [address]  [facility] [address]  Phone: [phone]  Fax: [phone]          Patient: Rosanna Live   MR Number: [id number]   YOB: 1960   Date of Visit: 11/21/2019       Dear Dr. Geena Roman:    Thank you for referring Rosanna Live to me for evaluation. Attached you will find relevant portions of my assessment and plan of care.    If you have questions, please do not hesitate to call me. I look forward to following Rosanna Live along with you.    Sincerely,    Collette Benavides, ANDREEA    Enclosure  CC:  No Recipients    If you would like to receive this communication electronically, please contact [email or web address] or [phone] to request more information on [employer] Link access.    For providers and/or their staff who would like to refer a patient to [facility], please contact us through our one-stop-shop provider referral line, [facility], at [phone].    If you feel you have received this communication in error or would no longer like to receive these types of communications, please e-mail [email or web address]          Patient/Caregiver provided printed discharge information.

## 2019-11-22 ENCOUNTER — OFFICE VISIT (OUTPATIENT)
Dept: INTERNAL MEDICINE | Facility: CLINIC | Age: 59
End: 2019-11-22
Payer: COMMERCIAL

## 2019-11-22 ENCOUNTER — IMMUNIZATION (OUTPATIENT)
Dept: PHARMACY | Facility: CLINIC | Age: 59
End: 2019-11-22
Payer: COMMERCIAL

## 2019-11-22 VITALS
HEIGHT: 66 IN | SYSTOLIC BLOOD PRESSURE: 124 MMHG | BODY MASS INDEX: 31.89 KG/M2 | WEIGHT: 198.44 LBS | HEART RATE: 77 BPM | DIASTOLIC BLOOD PRESSURE: 64 MMHG

## 2019-11-22 DIAGNOSIS — Z98.890 HISTORY OF NISSEN FUNDOPLICATION: ICD-10-CM

## 2019-11-22 DIAGNOSIS — D75.839 THROMBOCYTOSIS: ICD-10-CM

## 2019-11-22 DIAGNOSIS — R00.2 PALPITATIONS: ICD-10-CM

## 2019-11-22 DIAGNOSIS — G47.9 SLEEP DISTURBANCE: ICD-10-CM

## 2019-11-22 DIAGNOSIS — I83.90 VARICOSE VEINS OF LOWER EXTREMITY, UNSPECIFIED LATERALITY, UNSPECIFIED WHETHER COMPLICATED: ICD-10-CM

## 2019-11-22 DIAGNOSIS — F41.9 ANXIETY: ICD-10-CM

## 2019-11-22 DIAGNOSIS — Z00.00 ANNUAL PHYSICAL EXAM: Primary | ICD-10-CM

## 2019-11-22 DIAGNOSIS — K22.70 BARRETT'S ESOPHAGUS WITHOUT DYSPLASIA: ICD-10-CM

## 2019-11-22 DIAGNOSIS — R60.9 CHRONIC EDEMA: ICD-10-CM

## 2019-11-22 PROCEDURE — 99396 PR PREVENTIVE VISIT,EST,40-64: ICD-10-PCS | Mod: S$GLB,,, | Performed by: INTERNAL MEDICINE

## 2019-11-22 PROCEDURE — 99999 PR PBB SHADOW E&M-EST. PATIENT-LVL III: CPT | Mod: PBBFAC,,, | Performed by: INTERNAL MEDICINE

## 2019-11-22 PROCEDURE — 99396 PREV VISIT EST AGE 40-64: CPT | Mod: S$GLB,,, | Performed by: INTERNAL MEDICINE

## 2019-11-22 PROCEDURE — 99999 PR PBB SHADOW E&M-EST. PATIENT-LVL III: ICD-10-PCS | Mod: PBBFAC,,, | Performed by: INTERNAL MEDICINE

## 2019-11-22 RX ORDER — ESCITALOPRAM OXALATE 5 MG/1
5 TABLET ORAL DAILY
Qty: 90 TABLET | Refills: 3 | Status: SHIPPED | OUTPATIENT
Start: 2019-11-22 | End: 2020-11-28 | Stop reason: SDUPTHER

## 2019-11-22 NOTE — PROGRESS NOTES
"Subjective:       Patient ID: Rosanna Live is a 59 y.o. female.    Chief Complaint: Annual Exam   This is a 59-year-old who presents today for physical.  Patient reports that she has been working trying to eat healthier and getting weight she continues to have some issues with arthritis in knees.  She has recently seen GI for her history of Brito's and EGD showed some gastritis for which she was placed back on pantoprazole to continue long-term.  She is advised to have repeat EGD in 3 years.  Patient reports has been under some increased stress at times feels a bit more anxious she previously had Lexapro but never started it she does want a prescription she may consider doing so.  As her family tells her she is anxious.  She has had issues with sleep disturbance .  She has no trouble falling asleep but has trouble staying asleep at times although she feels rested when she wakes up she is not sure if she snores but will have her family check on her when she goes to the beach this weekend.  She does get occasional palpitations.  She has had chronic edema in her legs and more recently some worsening of her varicose veins she has had multiple evaluations and ultrasounds in the past has not seen vascular surgery like to consider doing that to see if that would be beneficial.      HPI  Review of Systems   Constitutional: Negative for fever.   Respiratory: Negative for cough, shortness of breath and wheezing.    Cardiovascular: Positive for palpitations and leg swelling. Negative for chest pain.   Gastrointestinal: Negative for abdominal pain and constipation.   Neurological: Negative for dizziness.   Psychiatric/Behavioral: Positive for sleep disturbance. The patient is nervous/anxious.        Objective:     Blood pressure 124/64, pulse 77, height 5' 6" (1.676 m), weight 90 kg (198 lb 6.6 oz).    Physical Exam   Constitutional: No distress.   HENT:   Head: Normocephalic.   Mouth/Throat: Oropharynx is clear " and moist.   Eyes: No scleral icterus.   Neck: Neck supple.   Cardiovascular: Normal rate, regular rhythm and normal heart sounds. Exam reveals no gallop and no friction rub.   No murmur heard.  Pulmonary/Chest: Effort normal and breath sounds normal. No respiratory distress.   Breast : normal no masses or tenderness    Abdominal: Soft. Bowel sounds are normal. She exhibits no mass. There is no tenderness.   Musculoskeletal: She exhibits no edema.   Chronic edema varicose veins    Neurological: She is alert.   Skin: No erythema.   Psychiatric: She has a normal mood and affect.   Vitals reviewed.      Assessment:       1. Annual physical exam    2. Palpitations    3. Varicose veins of lower extremity, unspecified laterality, unspecified whether complicated    4. Chronic edema    5. Anxiety    6. Brito's esophagus without dysplasia    7. Sleep disturbance    8. History of Nissen fundoplication    9. Thrombocytosis        Plan:       Rosanna Marshall was seen today for annual exam.    Diagnoses and all orders for this visit:    Annual physical exam  -     Basic metabolic panel; Future  -     CBC auto differential; Future  -     Hemoglobin A1c; Future  -     Hepatic function panel; Future  -     Lipid panel; Future  -     TSH; Future  Additional labs   -     Vitamin D; Future  -     Vitamin B12; Future  -     Magnesium; Future    Palpitations  Intermittent discussed will proceed with additional testing  -     EKG 12-lead; Future  -     Holter monitor - 24 hour; Future    Varicose veins of lower extremity, unspecified laterality, unspecified whether complicated  Chronic edema  For treatment options   -     Ambulatory consult to Vascular Surgery    gerd /barrets esophagus without dysplasia  She continues to follow with GI had a recent EGD and is back on pantoprazole  irritible bowel. She is using ibguard otc with impromvent has recently seen gi     Hx thrombocytosis has been stable she has seen hem/onc previously  Labs  and review     Anxiety  Discussed trial of medication she would like to do so prescription provided  -     escitalopram oxalate (LEXAPRO) 5 MG Tab; Take 1 tablet (5 mg total) by mouth once daily.    Sleep disturbance we discussed she will monitor for signs of apnea and have family check to see she feels well well rested and is not having difficulty falling asleep she is using melatonin occasion improvement we discussed reducing anxiety may along with journaling     She is up-to-date on her mammogram and smear    Flu shot recommended  She is up to date on coloncopy       She will return for recheck on medication approximately 12 weeks

## 2019-12-03 ENCOUNTER — TELEPHONE (OUTPATIENT)
Dept: VASCULAR SURGERY | Facility: CLINIC | Age: 59
End: 2019-12-03

## 2019-12-03 DIAGNOSIS — I87.2 VENOUS INSUFFICIENCY OF BOTH LOWER EXTREMITIES: Primary | ICD-10-CM

## 2019-12-10 ENCOUNTER — TELEPHONE (OUTPATIENT)
Dept: INTERNAL MEDICINE | Facility: CLINIC | Age: 59
End: 2019-12-10

## 2019-12-18 ENCOUNTER — HOSPITAL ENCOUNTER (OUTPATIENT)
Dept: RADIOLOGY | Facility: OTHER | Age: 59
Discharge: HOME | End: 2019-12-18
Attending: SURGERY
Payer: COMMERCIAL

## 2019-12-18 ENCOUNTER — OFFICE VISIT (OUTPATIENT)
Dept: VASCULAR SURGERY | Facility: CLINIC | Age: 59
End: 2019-12-18
Payer: COMMERCIAL

## 2019-12-18 VITALS
RESPIRATION RATE: 14 BRPM | HEART RATE: 101 BPM | DIASTOLIC BLOOD PRESSURE: 76 MMHG | WEIGHT: 196 LBS | SYSTOLIC BLOOD PRESSURE: 129 MMHG | BODY MASS INDEX: 31.64 KG/M2

## 2019-12-18 DIAGNOSIS — I87.2 VENOUS INSUFFICIENCY: Primary | ICD-10-CM

## 2019-12-18 DIAGNOSIS — I87.2 VENOUS INSUFFICIENCY OF BOTH LOWER EXTREMITIES: ICD-10-CM

## 2019-12-18 PROCEDURE — 93970 EXTREMITY STUDY: CPT | Mod: TC

## 2019-12-18 PROCEDURE — 99203 PR OFFICE/OUTPT VISIT, NEW, LEVL III, 30-44 MIN: ICD-10-PCS | Mod: S$GLB,,, | Performed by: SURGERY

## 2019-12-18 PROCEDURE — 93970 EXTREMITY STUDY: CPT | Mod: 26,,, | Performed by: RADIOLOGY

## 2019-12-18 PROCEDURE — 3008F PR BODY MASS INDEX (BMI) DOCUMENTED: ICD-10-PCS | Mod: CPTII,S$GLB,, | Performed by: SURGERY

## 2019-12-18 PROCEDURE — 93970 US LOWER EXTREMITY VEINS BILATERAL INSUFFICIENCY: ICD-10-PCS | Mod: 26,,, | Performed by: RADIOLOGY

## 2019-12-18 PROCEDURE — 99203 OFFICE O/P NEW LOW 30 MIN: CPT | Mod: S$GLB,,, | Performed by: SURGERY

## 2019-12-18 PROCEDURE — 3008F BODY MASS INDEX DOCD: CPT | Mod: CPTII,S$GLB,, | Performed by: SURGERY

## 2019-12-18 NOTE — LETTER
December 18, 2019      Geena Roman MD  [address]  [facility] [address]           [facility]  [address] [address]  Phone: [phone]  Fax: [phone]          Patient: Rosanna Live   MR Number: [id number]   YOB: 1960   Date of Visit: 12/18/2019       Dear Dr. Geena Roman:    Thank you for referring Rosanna Live to me for evaluation. Attached you will find relevant portions of my assessment and plan of care.    If you have questions, please do not hesitate to call me. I look forward to following Rosanna Live along with you.    Sincerely,    ELIF Cordero II, MD    Enclosure  CC:  No Recipients    If you would like to receive this communication electronically, please contact [email or web address] or [phone] to request more information on [employer] Link access.    For providers and/or their staff who would like to refer a patient to [facility], please contact us through our one-stop-shop provider referral line, [email or web address], at [phone].    If you feel you have received this communication in error or would no longer like to receive these types of communications, please e-mail [email or web address]

## 2019-12-18 NOTE — PROGRESS NOTES
VASCULAR SURGERY CLINIC NOTE    Patient ID: Rosanna Live is a 59 y.o. female.    I. HISTORY     Chief Complaint: Varicose Veins; Leg Pain; and Leg Swelling      HPI: Rosanna Live is a 59 y.o. female who is referred here today for evaluation of right leg swelling . She has noticed the swelling of his distal right leg and ankle for 10 years. She has noticed increasing number of varicose veins on the right leg as well.  Symptoms began years ago.  Location is right. Symptoms are worse at the end of the day. Patient is not wearing compression stockings daily. Patient has no history of DVT. History of venous interventions includes none.  + family history of venous disease.  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 She has discomfort and heaviness at the end of the day in her right leg/    Migraine with aura: No  PFO/ASD/right to left shunt:  no  Pregnant: No  Breastfeeding:  No    MI: no  Stroke: No  Seizure Disorder: No      Past Medical History:   Diagnosis Date    Allergy     Brito's esophagus     Basal cell cancer     Cataract     Diverticulosis     Edema     chronic right foot     Hiatal hernia 1/31/2017    IBS (irritable bowel syndrome)     Obesity     Rectovaginal fistula 6/28/2012    Vitamin D deficiency disease         Past Surgical History:   Procedure Laterality Date    CHOLECYSTECTOMY      COLONOSCOPY      ESOPHAGOGASTRODUODENOSCOPY      ESOPHAGOGASTRODUODENOSCOPY N/A 9/18/2019    Procedure: EGD (ESOPHAGOGASTRODUODENOSCOPY);  Surgeon: Carlos Manuel Barber MD;  Location: [address];  Service: Endoscopy;  Laterality: N/A;  evaluate fundoplication    INGUINAL HERNIA REPAIR      LAPAROSCOPIC NISSEN FUNDOPLICATION  2/15/16    Mohs' procedure of face      RECTO VAGINAL FISTULA  2011, 2012    UMBILICAL HERNIA REPAIR         Social History     Tobacco Use   Smoking Status  Former Smoker    Packs/day: 0.25    Years: 5.00    Pack years: 1.25    Last attempt to quit: 1983    Years since quittin.3   Smokeless Tobacco Never Used         Current Outpatient Medications:     pantoprazole (PROTONIX) 40 MG tablet, Take 1 tablet (40 mg total) by mouth before breakfast. Take one pill every morning 45 minutes before breakfast in the morning., Disp: 90 tablet, Rfl: 3    escitalopram oxalate (LEXAPRO) 5 MG Tab, Take 1 tablet (5 mg total) by mouth once daily. (Patient not taking: Reported on 2019), Disp: 90 tablet, Rfl: 3        II. PHYSICAL EXAM     Physical Exam    GEN: Alert/oriented  HEENT: atraumatic, normocephalic  CHEST: normal respiratory effort, symmetrical chest rise  CARD: RRR  EXT:   LLE edema, scattered medial calf varicose veins  RLE no edema, no caricosities    Imaging Results:  BLE Venous Duplex ultrasound 19 Impression:   Right Leg: Deep Venous system:  No DVT or reflux. GSV: +reflux throughout.  LSV: +reflux  Left Leg: Deep Venous system:  No DVT or reflux. GSV: Reflux mid thigh.  LSV: No reflux    III. ASSESSMENT & PLAN (MEDICAL DECISION MAKING)     1. Venous insufficiency        Assessment/Diagnosis and Plan:  59 y.o. female referred for evaluation of RLE swelling and varicose veins. Ultrasound of lower extremities reveals evidence of venous insufficiency in the right GSV and LSV.  Plan for conservative medical treatment at this time. The patient may benefit from right GSV +/- LSV ablation in the future if compression and elevation do not provide adequate relief of her swelling and discomfort.     -Recommend compression with 20-30mmHg Rx stockings, elevation, and dietary changes associated with water and sodium intake   -Exercise regularly  -RTC 3 months for further evaluation    I spent 15 minutes evaluating this patient and greater than 50% of the time was spent counseling, coordinator care and discussing the plan of care.  All questions were  answered and patient stated understanding with agreement with the above treatment plan.    ELIF Cordero II, MD, [facility]  Vascular Surgeon

## 2019-12-19 ENCOUNTER — CLINICAL SUPPORT (OUTPATIENT)
Dept: CARDIOLOGY | Facility: HOSPITAL | Age: 59
End: 2019-12-19
Attending: INTERNAL MEDICINE
Payer: COMMERCIAL

## 2019-12-19 ENCOUNTER — HOSPITAL ENCOUNTER (OUTPATIENT)
Dept: CARDIOLOGY | Facility: CLINIC | Age: 59
Discharge: HOME | End: 2019-12-19
Attending: INTERNAL MEDICINE
Payer: COMMERCIAL

## 2019-12-19 DIAGNOSIS — R00.2 PALPITATIONS: ICD-10-CM

## 2019-12-19 PROCEDURE — 93227 HOLTER MONITOR - 24 HOUR (CUPID ONLY): ICD-10-PCS | Mod: ,,, | Performed by: INTERNAL MEDICINE

## 2019-12-19 PROCEDURE — 93005 EKG 12-LEAD: ICD-10-PCS | Mod: S$GLB,,, | Performed by: INTERNAL MEDICINE

## 2019-12-19 PROCEDURE — 93225 XTRNL ECG REC<48 HRS REC: CPT

## 2019-12-19 PROCEDURE — 93227 XTRNL ECG REC<48 HR R&I: CPT | Mod: ,,, | Performed by: INTERNAL MEDICINE

## 2019-12-19 PROCEDURE — 93010 ELECTROCARDIOGRAM REPORT: CPT | Mod: S$GLB,,, | Performed by: INTERNAL MEDICINE

## 2019-12-19 PROCEDURE — 93010 EKG 12-LEAD: ICD-10-PCS | Mod: S$GLB,,, | Performed by: INTERNAL MEDICINE

## 2019-12-19 PROCEDURE — 93005 ELECTROCARDIOGRAM TRACING: CPT | Mod: S$GLB,,, | Performed by: INTERNAL MEDICINE

## 2019-12-23 LAB
OHS CV EVENT MONITOR DAY: 0
OHS CV HOLTER LENGTH DECIMAL HOURS: 23.98
OHS CV HOLTER LENGTH HOURS: 23
OHS CV HOLTER LENGTH MINUTES: 59

## 2019-12-31 ENCOUNTER — TELEPHONE (OUTPATIENT)
Dept: INTERNAL MEDICINE | Facility: CLINIC | Age: 59
End: 2019-12-31

## 2019-12-31 DIAGNOSIS — I47.19 ATRIAL TACHYCARDIA: ICD-10-CM

## 2019-12-31 DIAGNOSIS — R00.2 PALPITATIONS: Primary | ICD-10-CM

## 2019-12-31 NOTE — TELEPHONE ENCOUNTER
----- Message from Geena Roman MD sent at 12/31/2019 10:53 AM CST -----  Called and reviewed with pt  Discussed holter atrial tachycardia some symtoms  Discussed with pt   Plan cardiology consutlaion    Please call and scheudle cardiology consult

## 2020-01-08 ENCOUNTER — OFFICE VISIT (OUTPATIENT)
Dept: CARDIOLOGY | Facility: CLINIC | Age: 60
End: 2020-01-08
Payer: COMMERCIAL

## 2020-01-08 DIAGNOSIS — I87.2 VENOUS INSUFFICIENCY: ICD-10-CM

## 2020-01-08 DIAGNOSIS — I47.19 ATRIAL TACHYCARDIA: ICD-10-CM

## 2020-01-08 DIAGNOSIS — R00.2 PALPITATIONS: ICD-10-CM

## 2020-01-08 DIAGNOSIS — R07.89 ATYPICAL CHEST PAIN: ICD-10-CM

## 2020-01-08 PROCEDURE — 99204 PR OFFICE/OUTPT VISIT, NEW, LEVL IV, 45-59 MIN: ICD-10-PCS | Mod: S$GLB,,, | Performed by: INTERNAL MEDICINE

## 2020-01-08 PROCEDURE — 99204 OFFICE O/P NEW MOD 45 MIN: CPT | Mod: S$GLB,,, | Performed by: INTERNAL MEDICINE

## 2020-01-08 PROCEDURE — 99999 PR PBB SHADOW E&M-EST. PATIENT-LVL II: ICD-10-PCS | Mod: PBBFAC,,, | Performed by: INTERNAL MEDICINE

## 2020-01-08 PROCEDURE — 99999 PR PBB SHADOW E&M-EST. PATIENT-LVL II: CPT | Mod: PBBFAC,,, | Performed by: INTERNAL MEDICINE

## 2020-01-08 RX ORDER — METOPROLOL TARTRATE 25 MG/1
12.5 TABLET, FILM COATED ORAL 2 TIMES DAILY
Qty: 30 TABLET | Refills: 11 | Status: SHIPPED | OUTPATIENT
Start: 2020-01-08 | End: 2021-01-25

## 2020-01-08 RX ORDER — BETAMETHASONE DIPROPIONATE 0.5 MG/G
OINTMENT TOPICAL
COMMUNITY
Start: 2019-12-11 | End: 2020-08-14

## 2020-01-08 NOTE — ASSESSMENT & PLAN NOTE
Not worsened by exercise and pt w/ normal functional tolerance > 1 month ago.  Given age will eval for ischemia.  Pt w/ normal ECG.  - check ETT to eval for ischemia

## 2020-01-08 NOTE — ASSESSMENT & PLAN NOTE
Controlled.  Pt compliant w/ compression stockings and follows w/ vascular surgery.    - continue conservative measures for now

## 2020-01-08 NOTE — LETTER
January 8, 2020      Geena Roman MD  [address]  [facility] [address]           [facility]  [address], [facility] [id number]  [facility] [address]  Phone: [phone]  Fax: [phone]          Patient: Rosanna Live   MR Number: [id number]   YOB: 1960   Date of Visit: 1/8/2020       Dear Dr. Geena Roman:    Thank you for referring Rosanna Live to me for evaluation. Attached you will find relevant portions of my assessment and plan of care.    If you have questions, please do not hesitate to call me. I look forward to following Rosanna Live along with you.    Sincerely,    Maurice Brooks III, MD    Enclosure  CC:  No Recipients    If you would like to receive this communication electronically, please contact [email or web address] or [phone] to request more information on [employer] Link access.    For providers and/or their staff who would like to refer a patient to [facility], please contact us through our one-stop-shop provider referral line, [facility], at [phone].    If you feel you have received this communication in error or would no longer like to receive these types of communications, please e-mail [email or web address]

## 2020-01-08 NOTE — PROGRESS NOTES
Subjective:    Patient ID:  Rosanna Live is a 59 y.o. female who presents for evaluation of palpitations.    PCP/Referring: Geena Roman MD     HPI  Pt is a 58 yo F w/ PMH of Anxiety, venous insufficiency followed by vascular, and tobacco abuse (quit 1983, 1.25 pk/yrs) who presents for evaluation of palpitations x2 months.  She states that the palpitations presents randomly and last a few seconds however happen more at night.  She states that these episodes are frequent and 5/10 severity.  She mentions that she was evaluated by her PCP who obtained a 24 hr Holter monitor and ECG.  Her ECG was normal and the Holter noted sinus rhythm with nonsustained AT (7 secs) and 95 PACs and 221 PVCs.  She notes some chest tightness randomly and worse at night x2 months as well.  She denies sob, edema, orthopnea, PND, presyncope, LOC, or claudication.  She is compliant w/ meds and denies side effects.  She mentions that she used to workout 5x/wk at gym (treadmill and bike) w/o limitation, she recently 1 mo due to her symptoms.  She does not monitor her BP at home.             Past Medical History:   Diagnosis Date    Allergy     Brito's esophagus     Basal cell cancer     Cataract     Diverticulosis     Edema     chronic right foot     Hiatal hernia 1/31/2017    IBS (irritable bowel syndrome)     Obesity     Rectovaginal fistula 6/28/2012    Vitamin D deficiency disease      Past Surgical History:   Procedure Laterality Date    CHOLECYSTECTOMY      COLONOSCOPY      ESOPHAGOGASTRODUODENOSCOPY      ESOPHAGOGASTRODUODENOSCOPY N/A 9/18/2019    Procedure: EGD (ESOPHAGOGASTRODUODENOSCOPY);  Surgeon: Carlos Manuel Barber MD;  Location: [address];  Service: Endoscopy;  Laterality: N/A;  evaluate fundoplication    INGUINAL HERNIA REPAIR      LAPAROSCOPIC NISSEN FUNDOPLICATION  2/15/16    Mohs' procedure of face      RECTO VAGINAL FISTULA  2011, 2012    UMBILICAL HERNIA REPAIR       Social  History     Socioeconomic History    Marital status:      Spouse name: Not on file    Number of children: Not on file    Years of education: Not on file    Highest education level: Not on file   Occupational History    Occupation: Teacher     Employer: [employer]   Social Needs    Financial resource strain: Not on file    Food insecurity:     Worry: Not on file     Inability: Not on file    Transportation needs:     Medical: Not on file     Non-medical: Not on file   Tobacco Use    Smoking status: Former Smoker     Packs/day: 0.25     Years: 5.00     Pack years: 1.25     Last attempt to quit: 1983     Years since quittin.3    Smokeless tobacco: Never Used   Substance and Sexual Activity    Alcohol use: Yes     Alcohol/week: 1.0 standard drinks     Types: 1 Glasses of wine per week     Comment: Social    Drug use: No    Sexual activity: Never     Partners: Male     Birth control/protection: None, Abstinence   Lifestyle    Physical activity:     Days per week: Not on file     Minutes per session: Not on file    Stress: Not on file   Relationships    Social connections:     Talks on phone: Not on file     Gets together: Not on file     Attends Nondenominational service: Not on file     Active member of club or organization: Not on file     Attends meetings of clubs or organizations: Not on file     Relationship status: Not on file   Other Topics Concern    Not on file   Social History Narrative         Family History   Problem Relation Age of Onset    Arthritis Father         Rhematoid    Hyperlipidemia Father     Cataracts Father     Glaucoma Father     Heart disease Father     Miscarriages / Stillbirths Mother     Diabetes Mother     Hypertension Mother     Glaucoma Mother     Cataracts Mother     Heart disease Maternal Aunt     Heart disease Maternal Grandmother     No Known Problems Sister     No Known Problems Brother     No Known Problems Maternal  Uncle     No Known Problems Paternal Aunt     No Known Problems Paternal Uncle     No Known Problems Maternal Grandfather     No Known Problems Paternal Grandmother     No Known Problems Paternal Grandfather     Breast cancer Neg Hx     Colon cancer Neg Hx     Ovarian cancer Neg Hx        Review of patient's allergies indicates:   Allergen Reactions    Penicillins Rash       Medication List with Changes/Refills   New Medications    METOPROLOL TARTRATE (LOPRESSOR) 25 MG TABLET    Take 0.5 tablets (12.5 mg total) by mouth 2 (two) times daily.   Current Medications    BETAMETHASONE DIPROPIONATE (DIPROLENE) 0.05 % OINTMENT        ESCITALOPRAM OXALATE (LEXAPRO) 5 MG TAB    Take 1 tablet (5 mg total) by mouth once daily.    PANTOPRAZOLE (PROTONIX) 40 MG TABLET    Take 1 tablet (40 mg total) by mouth before breakfast. Take one pill every morning 45 minutes before breakfast in the morning.       Review of Systems   Constitution: Negative for diaphoresis and fever.   HENT: Negative for congestion and hearing loss.    Eyes: Negative for blurred vision and pain.   Cardiovascular: Positive for chest pain, leg swelling and palpitations. Negative for claudication, dyspnea on exertion, near-syncope, orthopnea, paroxysmal nocturnal dyspnea and syncope.   Respiratory: Negative for shortness of breath and sleep disturbances due to breathing.    Hematologic/Lymphatic: Negative for bleeding problem. Does not bruise/bleed easily.   Skin: Negative for color change and poor wound healing.   Gastrointestinal: Negative for abdominal pain and nausea.   Genitourinary: Negative for bladder incontinence and flank pain.   Neurological: Negative for focal weakness and light-headedness.        Objective:   LMP  (LMP Unknown)    Physical Exam   Constitutional: She is oriented to person, place, and time. She appears well-developed and well-nourished.   HENT:   Head: Normocephalic and atraumatic.   Mouth/Throat: Oropharynx is clear and  moist.   Eyes: Pupils are equal, round, and reactive to light. EOM are normal. No scleral icterus.   Neck: Normal range of motion. Neck supple. No JVD present.   Cardiovascular: Normal rate, regular rhythm, S1 normal, S2 normal and intact distal pulses. Exam reveals no gallop and no friction rub.   No murmur heard.  Pulmonary/Chest: Effort normal and breath sounds normal. No respiratory distress. She has no wheezes. She has no rales. She exhibits no tenderness.   Abdominal: Soft. Bowel sounds are normal. She exhibits no distension and no mass. There is no tenderness. There is no rebound.   Musculoskeletal: Normal range of motion. She exhibits no edema or tenderness.   Neurological: She is alert and oriented to person, place, and time. She displays normal reflexes. Coordination normal.   Skin: Skin is warm and dry. She is not diaphoretic. No pallor.   Psychiatric: She has a normal mood and affect. Her behavior is normal. Judgment normal.         Assessment:       1. Atrial tachycardia    2. Venous insufficiency    3. Palpitations    4. Atypical chest pain         Plan:         Atrial tachycardia  Nonsustained per recent 24 hr Holter.  Pt also w/ PVCs and PACs.    - check echo to eval cardiac function  - given pt noting frequent palpitations will start low dose metoprolol    Venous insufficiency  Controlled.  Pt compliant w/ compression stockings and follows w/ vascular surgery.    - continue conservative measures for now      Palpitations  Frequent episodes.    - will check echo as above  - metoprolol as above    Atypical chest pain  Not worsened by exercise and pt w/ normal functional tolerance > 1 month ago.  Given age will eval for ischemia.  Pt w/ normal ECG.  - check ETT to eval for ischemia       Total duration of face to face visit time 30 minutes.  Total time spent counseling greater than fifty percent of total visit time.  Counseling included discussion regarding imaging findings, diagnosis, possibilities,  treatment options, risks and benefits.  The patient had many questions regarding the options and long-term effects      Maurice Brooks M.D.  Interventional Cardiology

## 2020-01-08 NOTE — ASSESSMENT & PLAN NOTE
Nonsustained per recent 24 hr Holter.  Pt also w/ PVCs and PACs.    - check echo to eval cardiac function  - given pt noting frequent palpitations will start low dose metoprolol

## 2020-01-31 ENCOUNTER — OFFICE VISIT (OUTPATIENT)
Dept: INTERNAL MEDICINE | Facility: CLINIC | Age: 60
End: 2020-01-31
Payer: COMMERCIAL

## 2020-01-31 VITALS
WEIGHT: 204.81 LBS | HEIGHT: 66 IN | OXYGEN SATURATION: 96 % | SYSTOLIC BLOOD PRESSURE: 102 MMHG | BODY MASS INDEX: 32.92 KG/M2 | DIASTOLIC BLOOD PRESSURE: 74 MMHG | HEART RATE: 70 BPM

## 2020-01-31 DIAGNOSIS — I47.19 ATRIAL TACHYCARDIA: ICD-10-CM

## 2020-01-31 DIAGNOSIS — I87.2 VENOUS INSUFFICIENCY: ICD-10-CM

## 2020-01-31 DIAGNOSIS — I83.90 VARICOSE VEINS OF LOWER EXTREMITY, UNSPECIFIED LATERALITY, UNSPECIFIED WHETHER COMPLICATED: ICD-10-CM

## 2020-01-31 DIAGNOSIS — R00.2 PALPITATIONS: ICD-10-CM

## 2020-01-31 DIAGNOSIS — K22.70 BARRETT'S ESOPHAGUS WITHOUT DYSPLASIA: ICD-10-CM

## 2020-01-31 DIAGNOSIS — F41.9 ANXIETY: Primary | ICD-10-CM

## 2020-01-31 PROCEDURE — 3008F BODY MASS INDEX DOCD: CPT | Mod: CPTII,S$GLB,, | Performed by: INTERNAL MEDICINE

## 2020-01-31 PROCEDURE — 99999 PR PBB SHADOW E&M-EST. PATIENT-LVL III: ICD-10-PCS | Mod: PBBFAC,,, | Performed by: INTERNAL MEDICINE

## 2020-01-31 PROCEDURE — 99999 PR PBB SHADOW E&M-EST. PATIENT-LVL III: CPT | Mod: PBBFAC,,, | Performed by: INTERNAL MEDICINE

## 2020-01-31 PROCEDURE — 99214 PR OFFICE/OUTPT VISIT, EST, LEVL IV, 30-39 MIN: ICD-10-PCS | Mod: S$GLB,,, | Performed by: INTERNAL MEDICINE

## 2020-01-31 PROCEDURE — 99214 OFFICE O/P EST MOD 30 MIN: CPT | Mod: S$GLB,,, | Performed by: INTERNAL MEDICINE

## 2020-01-31 PROCEDURE — 3008F PR BODY MASS INDEX (BMI) DOCUMENTED: ICD-10-PCS | Mod: CPTII,S$GLB,, | Performed by: INTERNAL MEDICINE

## 2020-01-31 NOTE — PROGRESS NOTES
"Subjective:       Patient ID: Rosanna Live is a 59 y.o. female.    Chief Complaint: Follow-up   This is a 59-year-old who presents today for follow-up.  Patient reports that she has been doing well since last visit she did start the escitalopram because she was having increasing anxiety some work stress in her family also felt she would benefit she has been doing well with the medicine and would like to stick with the lower dose which seems to be working for her and she is tolerating it she has less anxiety overall and is also found that her sleep has gone back to normal.  She has been seeing vascular surgery now wearing support stockings consistently and feels the edema has improved she will continue to follow with them may consider surgical options in the future depending on how she does.  She has also seen Cardiology for her atrial tachycardia and feels her palpitations have improved they placed her on a low-dose metoprolol 1/2 tablet twice a day which she tolerates without dizziness and feels the symptoms are better she is undergoing some additional stress testing in the near future as well.  GI symptoms stable and she continues to follow with Gastroenterology    HPI  Review of Systems   Cardiovascular: Negative for chest pain.        Edema improving at times   Saw vascular using support stockings   Regularly    Psychiatric/Behavioral:        Sleep imroved  And anxiety better overall        Objective:     Blood pressure 102/74, pulse 70, height 5' 6" (1.676 m), weight 92.9 kg (204 lb 12.9 oz), SpO2 96 %.    Physical Exam   Constitutional: No distress.   HENT:   Head: Normocephalic.   Mouth/Throat: Oropharynx is clear and moist.   Eyes: No scleral icterus.   Neck: Neck supple.   Cardiovascular: Normal rate, regular rhythm and normal heart sounds. Exam reveals no gallop and no friction rub.   No murmur heard.  Pulmonary/Chest: Effort normal and breath sounds normal. No respiratory distress. "   Abdominal: Soft. Bowel sounds are normal. She exhibits no mass. There is no tenderness.   Musculoskeletal: She exhibits no edema.   Stable edema right   Varicose veins    Neurological: She is alert.   Skin: No erythema.   Psychiatric: She has a normal mood and affect.   Vitals reviewed.      Assessment:       1. Anxiety    2. Atrial tachycardia    3. Palpitations    4. Varicose veins of lower extremity, unspecified laterality, unspecified whether complicated    5. Venous insufficiency    6. Brito's esophagus without dysplasia        Plan:       Rosanna HarringtonRolando was seen today for follow-up.    Diagnoses and all orders for this visit:    Anxiety  She patient doing well now on low-dose Lexapro tolerating without difficulty she would like to maintain her present regimen    Atrial tachycardia  Palpitations  Patient feels improving she is following with Cardiology and using metoprolol currently    Varicose veins of lower extremity, unspecified laterality, unspecified whether complicated  Venous insufficiency  History of she is wearing support stockings and following with vascular surgery    Brito's esophagus without dysplasia  Remains on proton pump inhibitor and follows with GI    She will return for annual physical sooner if concern

## 2020-02-03 LAB
ASCENDING AORTA: 2.36 CM
AV INDEX (PROSTH): 0.95
AV MEAN GRADIENT: 4 MMHG
AV PEAK GRADIENT: 9 MMHG
AV VALVE AREA: 2.84 CM2
AV VELOCITY RATIO: 0.83
CV ECHO LV RWT: 0.59 CM
DOP CALC AO PEAK VEL: 1.52 M/S
DOP CALC AO VTI: 25.29 CM
DOP CALC LVOT AREA: 3 CM2
DOP CALC LVOT DIAMETER: 1.95 CM
DOP CALC LVOT PEAK VEL: 1.26 M/S
DOP CALC LVOT STROKE VOLUME: 71.7 CM3
DOP CALCLVOT PEAK VEL VTI: 24.02 CM
E WAVE DECELERATION TIME: 296.57 MSEC
E/A RATIO: 0.83
ECHO LV POSTERIOR WALL: 1.14 CM (ref 0.6–1.1)
FRACTIONAL SHORTENING: 43 % (ref 28–44)
INTERVENTRICULAR SEPTUM: 1.08 CM (ref 0.6–1.1)
IVRT: 0.12 MSEC
LA MAJOR: 4.1 CM
LA WIDTH: 3.8 CM
LEFT ATRIUM SIZE: 2.9 CM
LEFT INTERNAL DIMENSION IN SYSTOLE: 2.2 CM (ref 2.1–4)
LEFT VENTRICLE DIASTOLIC VOLUME: 65.25 ML
LEFT VENTRICLE SYSTOLIC VOLUME: 16.2 ML
LEFT VENTRICULAR INTERNAL DIMENSION IN DIASTOLE: 3.88 CM (ref 3.5–6)
LEFT VENTRICULAR MASS: 140.85 G
MV PEAK A VEL: 0.76 M/S
MV PEAK E VEL: 0.63 M/S
PISA TR MAX VEL: 2.55 M/S
RA MAJOR: 3.83 CM
RA PRESSURE: 3 MMHG
RIGHT VENTRICULAR END-DIASTOLIC DIMENSION: 3.09 CM
STJ: 2.99 CM
TR MAX PG: 26 MMHG
TRICUSPID ANNULAR PLANE SYSTOLIC EXCURSION: 2.25 CM
TV REST PULMONARY ARTERY PRESSURE: 29 MMHG

## 2020-03-31 ENCOUNTER — TELEPHONE (OUTPATIENT)
Dept: VASCULAR SURGERY | Facility: CLINIC | Age: 60
End: 2020-03-31

## 2020-04-02 ENCOUNTER — PATIENT MESSAGE (OUTPATIENT)
Dept: INTERNAL MEDICINE | Facility: CLINIC | Age: 60
End: 2020-04-02

## 2020-04-03 ENCOUNTER — TELEPHONE (OUTPATIENT)
Dept: VASCULAR SURGERY | Facility: CLINIC | Age: 60
End: 2020-04-03

## 2020-04-03 NOTE — TELEPHONE ENCOUNTER
Called and spoke with pt  Symptoms 7 days no sob  Declines symptom tracker  She is self isolating   Some nasal congestion sinus drip  Recommended flonase/saline spray  Call if sign of secondary infection develop discussed

## 2020-04-03 NOTE — TELEPHONE ENCOUNTER
Spoke with pt and encouraged a virtual visit, pt confirmed she would access her virtual appt on 4/15/2020 at 1130.

## 2020-04-15 ENCOUNTER — OFFICE VISIT (OUTPATIENT)
Dept: VASCULAR SURGERY | Facility: CLINIC | Age: 60
End: 2020-04-15
Payer: COMMERCIAL

## 2020-04-15 DIAGNOSIS — I87.2 VENOUS INSUFFICIENCY: Primary | ICD-10-CM

## 2020-04-15 PROCEDURE — 99213 OFFICE O/P EST LOW 20 MIN: CPT | Mod: 95,,, | Performed by: SURGERY

## 2020-04-15 PROCEDURE — 99213 PR OFFICE/OUTPT VISIT, EST, LEVL III, 20-29 MIN: ICD-10-PCS | Mod: 95,,, | Performed by: SURGERY

## 2020-04-15 NOTE — PROGRESS NOTES
The patient location is: home  The chief complaint leading to consultation is: leg swelling  Visit type: audiovisual  Total time spent with patient: 12min  Each patient to whom he or she provides medical services by telemedicine is:  (1) informed of the relationship between the physician and patient and the respective role of any other health care provider with respect to management of the patient; and (2) notified that he or she may decline to receive medical services by telemedicine and may withdraw from such care at any time.    Notes: see below        VASCULAR SURGERY TELEMEDICINE CLINIC NOTE    Patient ID: Rosanna Live is a 60 y.o. female.    I. HISTORY     Chief Complaint: leg swelling/discomfort    (Telemed Visit performed in the midst of Coronavirus pandemic)    HPI: Rosanna Live is a 60 y.o. female who was referred to see me 3 months ago gor leg swelling/discomfort. She reports no significant changes in symptoms since the last time she saw me in clinic. She notices that her swelling improves when she wears her compression hose. She did have coronavirus-like symptoms about a month ago with anosmia, diarrhea, fever, congestion, and severe weakness/fatigue. She self-quarantined for 2 weeks and has fully recovered now. She has been staying at home with her family and wearing her compression hose whenever she is able. She has been elevating her legs when sedentary. Overall her symptoms are slightly improved and she feels like they do not limit her overall functional status. She denies any other changes in medications, medical history or symptoms since her last visit.    Migraine with aura: No  PFO/ASD/right to left shunt:  no  Pregnant: No  Breastfeeding:  No    MI: no  Stroke: No  Seizure Disorder: No      Past Medical History:   Diagnosis Date    Allergy     Brito's esophagus     Basal cell cancer     Cataract     Diverticulosis     Edema     chronic right foot     Hiatal  hernia 2017    IBS (irritable bowel syndrome)     Obesity     Rectovaginal fistula 2012    Vitamin D deficiency disease         Past Surgical History:   Procedure Laterality Date    CHOLECYSTECTOMY      COLONOSCOPY      ESOPHAGOGASTRODUODENOSCOPY      ESOPHAGOGASTRODUODENOSCOPY N/A 2019    Procedure: EGD (ESOPHAGOGASTRODUODENOSCOPY);  Surgeon: Carlos Manuel Barber MD;  Location: [address]);  Service: Endoscopy;  Laterality: N/A;  evaluate fundoplication    INGUINAL HERNIA REPAIR      LAPAROSCOPIC NISSEN FUNDOPLICATION  2/15/16    Mohs' procedure of face      RECTO VAGINAL FISTULA  ,     UMBILICAL HERNIA REPAIR         Social History     Tobacco Use   Smoking Status Former Smoker    Packs/day: 0.25    Years: 5.00    Pack years: 1.25    Last attempt to quit: 1983    Years since quittin.6   Smokeless Tobacco Never Used         Current Outpatient Medications:     betamethasone dipropionate (DIPROLENE) 0.05 % ointment, , Disp: , Rfl:     escitalopram oxalate (LEXAPRO) 5 MG Tab, Take 1 tablet (5 mg total) by mouth once daily., Disp: 90 tablet, Rfl: 3    metoprolol tartrate (LOPRESSOR) 25 MG tablet, Take 0.5 tablets (12.5 mg total) by mouth 2 (two) times daily., Disp: 30 tablet, Rfl: 11    pantoprazole (PROTONIX) 40 MG tablet, Take 1 tablet (40 mg total) by mouth before breakfast. Take one pill every morning 45 minutes before breakfast in the morning., Disp: 90 tablet, Rfl: 3    Review of Systems   Constitutional: Negative for chills and fever.   HENT: Negative for congestion and sore throat.    Respiratory: Negative for cough and shortness of breath.    Cardiovascular: Positive for leg swelling. Negative for chest pain.   Skin: Negative for rash.       II. PHYSICAL EXAM     Physical Exam  Performed through video observation  GEN: Alert/oriented, normal affect  HEENT: atraumatic, normocephalic  CHEST: normal respiratory effort, symmetrical chest rise    Imaging  Results:  BLE Venous Duplex ultrasound 12/18/19 Impression:   Right Leg: Deep Venous system:  No DVT or reflux. GSV: +reflux throughout.  LSV: +reflux  Left Leg: Deep Venous system:  No DVT or reflux. GSV: Reflux mid thigh.  LSV: No reflux    III. ASSESSMENT & PLAN (MEDICAL DECISION MAKING)     1. Venous insufficiency        Assessment/Diagnosis and Plan:  60 y.o. female referred for evaluation of RLE swelling. Overall her symptoms are currently manageable with compression alone and are not having a significant impact on her quality of life. She would not like to proceed with any intervention for her symptoms at this time. She will follow up as needed if symptoms worsen in the future.    -Recommend compression with 20-30mmHg Rx stockings, elevation, and dietary changes associated with water and sodium intake   -Exercise regularly  -RTC PRN    I spent 15 minutes evaluating this patient and greater than 50% of the time was spent counseling, coordinator care and discussing the plan of care.  All questions were answered and patient stated understanding with agreement with the above treatment plan.    ELIF Cordero II, MD, [facility]  Vascular Surgeon

## 2020-07-14 ENCOUNTER — OFFICE VISIT (OUTPATIENT)
Dept: OBSTETRICS AND GYNECOLOGY | Facility: CLINIC | Age: 60
End: 2020-07-14
Payer: COMMERCIAL

## 2020-07-14 VITALS
BODY MASS INDEX: 33.31 KG/M2 | DIASTOLIC BLOOD PRESSURE: 76 MMHG | SYSTOLIC BLOOD PRESSURE: 114 MMHG | WEIGHT: 207.25 LBS | HEIGHT: 66 IN

## 2020-07-14 DIAGNOSIS — N95.9 MENOPAUSAL AND PERIMENOPAUSAL DISORDER: ICD-10-CM

## 2020-07-14 DIAGNOSIS — Z12.39 BREAST CANCER SCREENING: ICD-10-CM

## 2020-07-14 DIAGNOSIS — Z01.419 VISIT FOR GYNECOLOGIC EXAMINATION: Primary | ICD-10-CM

## 2020-07-14 PROCEDURE — 99396 PR PREVENTIVE VISIT,EST,40-64: ICD-10-PCS | Mod: S$GLB,,, | Performed by: OBSTETRICS & GYNECOLOGY

## 2020-07-14 PROCEDURE — 99999 PR PBB SHADOW E&M-EST. PATIENT-LVL III: ICD-10-PCS | Mod: PBBFAC,,, | Performed by: OBSTETRICS & GYNECOLOGY

## 2020-07-14 PROCEDURE — 99999 PR PBB SHADOW E&M-EST. PATIENT-LVL III: CPT | Mod: PBBFAC,,, | Performed by: OBSTETRICS & GYNECOLOGY

## 2020-07-14 PROCEDURE — 3008F BODY MASS INDEX DOCD: CPT | Mod: CPTII,S$GLB,, | Performed by: OBSTETRICS & GYNECOLOGY

## 2020-07-14 PROCEDURE — 99396 PREV VISIT EST AGE 40-64: CPT | Mod: S$GLB,,, | Performed by: OBSTETRICS & GYNECOLOGY

## 2020-07-14 PROCEDURE — 3008F PR BODY MASS INDEX (BMI) DOCUMENTED: ICD-10-PCS | Mod: CPTII,S$GLB,, | Performed by: OBSTETRICS & GYNECOLOGY

## 2020-07-14 NOTE — PROGRESS NOTES
HISTORY OF PRESENT ILLNESS:    Rosanna Live is a 60 y.o. female , presents for a routine exam and has no complaints.  MAMMO ORDER AND NL PAP .  NO GYN C/O.  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 female , presents for a routine exam and has no gyn complaints.      Past Medical History:   Diagnosis Date    Allergy     Brito's esophagus     Basal cell cancer     Cataract     Diverticulosis     Edema     chronic right foot     Hiatal hernia 2017    IBS (irritable bowel syndrome)     Obesity     Rectovaginal fistula 2012    Vitamin D deficiency disease        Past Surgical History:   Procedure Laterality Date    CHOLECYSTECTOMY      COLONOSCOPY      ESOPHAGOGASTRODUODENOSCOPY      ESOPHAGOGASTRODUODENOSCOPY N/A 2019    Procedure: EGD (ESOPHAGOGASTRODUODENOSCOPY);  Surgeon: Carlos Manuel Barber MD;  Location: [address];  Service: Endoscopy;  Laterality: N/A;  evaluate fundoplication    INGUINAL HERNIA REPAIR      LAPAROSCOPIC NISSEN FUNDOPLICATION  2/15/16    Mohs' procedure of face      RECTO VAGINAL FISTULA  ,     UMBILICAL HERNIA REPAIR          MEDICATIONS AND ALLERGIES:      Current Outpatient Medications:     escitalopram oxalate (LEXAPRO) 5 MG Tab, Take 1 tablet (5 mg total) by mouth once daily., Disp: 90 tablet, Rfl: 3    metoprolol tartrate (LOPRESSOR) 25 MG tablet, Take 0.5 tablets (12.5 mg total) by mouth 2 (two) times daily., Disp: 30 tablet, Rfl: 11    pantoprazole (PROTONIX) 40 MG tablet, Take 1 tablet (40 mg total) by mouth before breakfast. Take one pill every morning 45 minutes before breakfast in the morning., Disp: 90 tablet, Rfl: 3    betamethasone dipropionate (DIPROLENE) 0.05 % ointment, , Disp: , Rfl:     Review of  patient's allergies indicates:   Allergen Reactions    Penicillins Rash       Family History   Problem Relation Age of Onset    Arthritis Father         Rhematoid    Hyperlipidemia Father     Cataracts Father     Glaucoma Father     Heart disease Father     Miscarriages / Stillbirths Mother     Diabetes Mother     Hypertension Mother     Glaucoma Mother     Cataracts Mother     Heart disease Maternal Aunt     Heart disease Maternal Grandmother     No Known Problems Sister     No Known Problems Brother     No Known Problems Maternal Uncle     No Known Problems Paternal Aunt     No Known Problems Paternal Uncle     No Known Problems Maternal Grandfather     No Known Problems Paternal Grandmother     No Known Problems Paternal Grandfather     Breast cancer Neg Hx     Colon cancer Neg Hx     Ovarian cancer Neg Hx        Social History     Socioeconomic History    Marital status:      Spouse name: Not on file    Number of children: Not on file    Years of education: Not on file    Highest education level: Not on file   Occupational History    Occupation: Teacher     Employer: [employer]   Social Needs    Financial resource strain: Not on file    Food insecurity     Worry: Not on file     Inability: Not on file    Transportation needs     Medical: Not on file     Non-medical: Not on file   Tobacco Use    Smoking status: Former Smoker     Packs/day: 0.25     Years: 5.00     Pack years: 1.25     Quit date: 1983     Years since quittin.8    Smokeless tobacco: Never Used   Substance and Sexual Activity    Alcohol use: Yes     Alcohol/week: 1.0 standard drinks     Types: 1 Glasses of wine per week     Comment: Social    Drug use: No    Sexual activity: Never     Partners: Male     Birth control/protection: None, Abstinence   Lifestyle    Physical activity     Days per week: Not on file     Minutes per session: Not on file    Stress: Not on file  "  Relationships    Social connections     Talks on phone: Not on file     Gets together: Not on file     Attends Quaker service: Not on file     Active member of club or organization: Not on file     Attends meetings of clubs or organizations: Not on file     Relationship status: Not on file   Other Topics Concern    Not on file   Social History Narrative           COMPREHENSIVE GYN HISTORY:  PAP History:  Denies abnormal Paps except a noted above.  Infection History: Denies STDs. Denies PID.  Benign History: Denies uterine fibroids. Denies ovarian cysts. Denies endometriosis.  Denies other conditions.  Cancer History: Denies cervical cancer. Denies uterine cancer or hyperplasia. Denies ovarian cancer. Denies vulvar cancer or pre-cancer. Denies vaginal cancer or pre-cancer. Denies breast cancer. Denies colon cancer.    ROS:  GENERAL: No weight changes. No swelling. No fatigue. No fever.  CARDIOVASCULAR: No chest pain. No shortness of breath. No leg cramps.   NEUROLOGICAL: No headaches. No vision changes.  BREASTS: No pain. No lumps. No discharge.  ABDOMEN: No pain. No nausea. No vomiting. No diarrhea. No constipation.  REPRODUCTIVE: No abnormal bleeding.   VULVA: No pain. No lesions. No itching.  VAGINA: No relaxation. No itching. No odor. No discharge. No lesions.  URINARY: No incontinence. No nocturia. No frequency. No dysuria.    /76   Ht 5' 6" (1.676 m)   Wt 94 kg (207 lb 3.7 oz)   LMP  (LMP Unknown)   BMI 33.45 kg/m²     PE:  APPEARANCE: Well nourished, well developed, in no acute distress.  AFFECT: WNL, alert and oriented x 3.  SKIN: No hirsutism or acne.  NECK: Neck symmetric without masses or thyromegaly.  NODES: No inguinal, cervical, axillary or femoral lymph node enlargement.  CHEST: Good respiratory effort.   ABDOMEN: Soft. No tenderness or masses. No hepatosplenomegaly. No hernias.  BREASTS: Symmetrical, no skin changes or visible lesions. No palpable masses, nipple discharge " bilaterally.  PELVIC: ATROPHIC EXTERNAL FEMALE GENITALIA without lesions. Normal hair distribution. Adequate perineal body, normal urethral meatus. VAGINA DRY without lesions or discharge. CERVIX STENOTIC without lesions, discharge or tenderness. No significant cystocele or rectocele. Bimanual exam shows uterus to be normal size, regular, mobile and nontender. Adnexa without masses or tenderness.  EXTREMITIES: No edema.    PROCEDURES:    DIAGNOSIS:  1. Visit for gynecologic examination     2. Menopausal and perimenopausal disorder     3. Breast cancer screening  Mammo Digital Screening Bilat w/ Joe       PLAN:    LABS AND TESTS ORDERED:  Mammogram    COUNSELING:  The patient was counseled today on osteoporosis prevention, calcium supplementation, and regular weight bearing exercise. The patient was also counseled today on ACS PAP guidelines, with recommendations for yearly pelvic exams unless their uterus, cervix, and ovaries were removed for benign reasons; in that case, examinations every 3-5 years are recommended.  The patient was also counseled regarding monthly breast self-examination, routine STD screening for at-risk populations, prophylactic immunizations for transmitted infections such as  HPV, Pertussis, or Influenza as appropriate, and yearly mammograms when indicated by ACS guidelines.  She was advised to see her primary care physician for all other health maintenance.    FOLLOW-UP with me annually.

## 2020-07-29 ENCOUNTER — HOSPITAL ENCOUNTER (OUTPATIENT)
Dept: RADIOLOGY | Facility: HOSPITAL | Age: 60
Discharge: HOME | End: 2020-07-29
Attending: OBSTETRICS & GYNECOLOGY
Payer: COMMERCIAL

## 2020-07-29 DIAGNOSIS — Z12.39 BREAST CANCER SCREENING: ICD-10-CM

## 2020-07-29 PROCEDURE — 77063 MAMMO DIGITAL SCREENING BILAT WITH TOMOSYNTHESIS_CAD: ICD-10-PCS | Mod: 26,,, | Performed by: RADIOLOGY

## 2020-07-29 PROCEDURE — 77067 MAMMO DIGITAL SCREENING BILAT WITH TOMOSYNTHESIS_CAD: ICD-10-PCS | Mod: 26,,, | Performed by: RADIOLOGY

## 2020-07-29 PROCEDURE — 77067 SCR MAMMO BI INCL CAD: CPT | Mod: 26,,, | Performed by: RADIOLOGY

## 2020-07-29 PROCEDURE — 77063 BREAST TOMOSYNTHESIS BI: CPT | Mod: 26,,, | Performed by: RADIOLOGY

## 2020-07-29 PROCEDURE — 77067 SCR MAMMO BI INCL CAD: CPT | Mod: TC

## 2020-08-11 ENCOUNTER — TELEPHONE (OUTPATIENT)
Dept: GASTROENTEROLOGY | Facility: CLINIC | Age: 60
End: 2020-08-11

## 2020-08-14 ENCOUNTER — OFFICE VISIT (OUTPATIENT)
Dept: CARDIOLOGY | Facility: CLINIC | Age: 60
End: 2020-08-14
Payer: COMMERCIAL

## 2020-08-14 VITALS
BODY MASS INDEX: 33.72 KG/M2 | SYSTOLIC BLOOD PRESSURE: 120 MMHG | WEIGHT: 209.81 LBS | HEIGHT: 66 IN | HEART RATE: 86 BPM | OXYGEN SATURATION: 97 % | DIASTOLIC BLOOD PRESSURE: 68 MMHG

## 2020-08-14 DIAGNOSIS — I87.2 VENOUS INSUFFICIENCY: ICD-10-CM

## 2020-08-14 DIAGNOSIS — R00.2 PALPITATIONS: ICD-10-CM

## 2020-08-14 DIAGNOSIS — R07.89 ATYPICAL CHEST PAIN: ICD-10-CM

## 2020-08-14 DIAGNOSIS — I47.19 ATRIAL TACHYCARDIA: ICD-10-CM

## 2020-08-14 PROCEDURE — 3008F BODY MASS INDEX DOCD: CPT | Mod: CPTII,S$GLB,, | Performed by: INTERNAL MEDICINE

## 2020-08-14 PROCEDURE — 99999 PR PBB SHADOW E&M-EST. PATIENT-LVL III: CPT | Mod: PBBFAC,,, | Performed by: INTERNAL MEDICINE

## 2020-08-14 PROCEDURE — 99214 OFFICE O/P EST MOD 30 MIN: CPT | Mod: S$GLB,,, | Performed by: INTERNAL MEDICINE

## 2020-08-14 PROCEDURE — 3008F PR BODY MASS INDEX (BMI) DOCUMENTED: ICD-10-PCS | Mod: CPTII,S$GLB,, | Performed by: INTERNAL MEDICINE

## 2020-08-14 PROCEDURE — 99214 PR OFFICE/OUTPT VISIT, EST, LEVL IV, 30-39 MIN: ICD-10-PCS | Mod: S$GLB,,, | Performed by: INTERNAL MEDICINE

## 2020-08-14 PROCEDURE — 99999 PR PBB SHADOW E&M-EST. PATIENT-LVL III: ICD-10-PCS | Mod: PBBFAC,,, | Performed by: INTERNAL MEDICINE

## 2020-08-14 NOTE — ASSESSMENT & PLAN NOTE
Nonsustained per recent 24 hr Holter.  Pt also w/ PVCs and PACs.  Pt denies any further episodes of palpitations.  - continue metoprolol

## 2020-08-14 NOTE — ASSESSMENT & PLAN NOTE
Resolved.  Not worsened by exercise and pt w/ normal functional tolerance > 1 month ago.  Given age will eval for ischemia.  Pt w/ normal ECG.  ETT negative for ischemia, normal exercise tolerance.

## 2020-08-14 NOTE — PROGRESS NOTES
Subjective:    Patient ID:  Rosanna Live is a 60 y.o. female who presents for follow-up of palpitations.    PCP/Referring: Geena Roman MD     Pt is a 59 yo F w/ PMH of Anxiety, Venous insufficiency followed by vascular, and tobacco abuse (quit 1983, 1.25 pk/yrs) who presents for f/u of palpitations.  She was last seen 1/8/2020 and noted to have palpitations and atypical chest pain and was started on metiprolol.  In the interim she had a echo and ETT which both were WNL.  She mentions that she has been doing well since her last appt.  Her palpitations and atypical chest pain have resolved since the start of her metoprolol.  She notes improved edema w/ compliance w/ compression stockings.  She denies sob, orthopnea, PND, presyncope, LOC, or claudication.  She is compliant w/ meds and denies side effects.  She mentions that she used to workout 5x/wk at gym (treadmill and bike) w/o limitation, however due to COVID she has not been to the gym and has decreased her activity.  She denies any limitations.  She does not monitor her BP at home.             Past Medical History:   Diagnosis Date    Allergy     Brito's esophagus     Basal cell cancer     Cataract     Diverticulosis     Edema     chronic right foot     Hiatal hernia 1/31/2017    IBS (irritable bowel syndrome)     Obesity     Rectovaginal fistula 6/28/2012    Vitamin D deficiency disease      Past Surgical History:   Procedure Laterality Date    CHOLECYSTECTOMY      COLONOSCOPY      ESOPHAGOGASTRODUODENOSCOPY      ESOPHAGOGASTRODUODENOSCOPY N/A 9/18/2019    Procedure: EGD (ESOPHAGOGASTRODUODENOSCOPY);  Surgeon: Carlos Manuel Barber MD;  Location: [address]);  Service: Endoscopy;  Laterality: N/A;  evaluate fundoplication    INGUINAL HERNIA REPAIR      LAPAROSCOPIC NISSEN FUNDOPLICATION  2/15/16    Mohs' procedure of face      RECTO VAGINAL FISTULA  2011, 2012    UMBILICAL HERNIA REPAIR       Social History      Socioeconomic History    Marital status:      Spouse name: Not on file    Number of children: Not on file    Years of education: Not on file    Highest education level: Not on file   Occupational History    Occupation: Teacher     Employer: [employer]   Social Needs    Financial resource strain: Not on file    Food insecurity     Worry: Not on file     Inability: Not on file    Transportation needs     Medical: Not on file     Non-medical: Not on file   Tobacco Use    Smoking status: Former Smoker     Packs/day: 0.25     Years: 5.00     Pack years: 1.25     Quit date: 1983     Years since quittin.9    Smokeless tobacco: Never Used   Substance and Sexual Activity    Alcohol use: Yes     Alcohol/week: 1.0 standard drinks     Types: 1 Glasses of wine per week     Comment: Social    Drug use: No    Sexual activity: Never     Partners: Male     Birth control/protection: None, Abstinence   Lifestyle    Physical activity     Days per week: Not on file     Minutes per session: Not on file    Stress: Not on file   Relationships    Social connections     Talks on phone: Not on file     Gets together: Not on file     Attends Sabianism service: Not on file     Active member of club or organization: Not on file     Attends meetings of clubs or organizations: Not on file     Relationship status: Not on file   Other Topics Concern    Not on file   Social History Narrative         Family History   Problem Relation Age of Onset    Arthritis Father         Rhematoid    Hyperlipidemia Father     Cataracts Father     Glaucoma Father     Heart disease Father     Miscarriages / Stillbirths Mother     Diabetes Mother     Hypertension Mother     Glaucoma Mother     Cataracts Mother     Heart disease Maternal Aunt     Heart disease Maternal Grandmother     No Known Problems Sister     No Known Problems Brother     No Known Problems Maternal Uncle     No Known  "Problems Paternal Aunt     No Known Problems Paternal Uncle     No Known Problems Maternal Grandfather     No Known Problems Paternal Grandmother     No Known Problems Paternal Grandfather     Breast cancer Neg Hx     Colon cancer Neg Hx     Ovarian cancer Neg Hx        Review of patient's allergies indicates:   Allergen Reactions    Penicillins Rash       Medication List with Changes/Refills   Current Medications    ESCITALOPRAM OXALATE (LEXAPRO) 5 MG TAB    Take 1 tablet (5 mg total) by mouth once daily.    METOPROLOL TARTRATE (LOPRESSOR) 25 MG TABLET    Take 0.5 tablets (12.5 mg total) by mouth 2 (two) times daily.    PANTOPRAZOLE (PROTONIX) 40 MG TABLET    Take 1 tablet (40 mg total) by mouth before breakfast. Take one pill every morning 45 minutes before breakfast in the morning.   Discontinued Medications    BETAMETHASONE DIPROPIONATE (DIPROLENE) 0.05 % OINTMENT           Review of Systems   Constitution: Negative for diaphoresis and fever.   HENT: Negative for congestion and hearing loss.    Eyes: Negative for blurred vision and pain.   Cardiovascular: Positive for leg swelling. Negative for chest pain, claudication, dyspnea on exertion, near-syncope, orthopnea, palpitations, paroxysmal nocturnal dyspnea and syncope.   Respiratory: Negative for shortness of breath and sleep disturbances due to breathing.    Hematologic/Lymphatic: Negative for bleeding problem. Does not bruise/bleed easily.   Skin: Negative for color change and poor wound healing.   Gastrointestinal: Negative for abdominal pain and nausea.   Genitourinary: Negative for bladder incontinence and flank pain.   Neurological: Negative for focal weakness and light-headedness.        Objective:   /68 (BP Location: Left arm, Patient Position: Sitting, BP Method: Large (Manual))   Pulse 86   Ht 5' 6" (1.676 m)   Wt 95.2 kg (209 lb 12.8 oz)   LMP  (LMP Unknown)   SpO2 97%   BMI 33.86 kg/m²    Physical Exam   Constitutional: She is " oriented to person, place, and time. She appears well-developed and well-nourished.   HENT:   Head: Normocephalic and atraumatic.   Mouth/Throat: Oropharynx is clear and moist.   Eyes: Pupils are equal, round, and reactive to light. EOM are normal. No scleral icterus.   Neck: Normal range of motion. Neck supple. No JVD present.   Cardiovascular: Normal rate, regular rhythm, S1 normal, S2 normal and intact distal pulses. Exam reveals no gallop and no friction rub.   No murmur heard.  Pulmonary/Chest: Effort normal and breath sounds normal. No respiratory distress. She has no wheezes. She has no rales. She exhibits no tenderness.   Abdominal: Soft. Bowel sounds are normal. She exhibits no distension and no mass. There is no abdominal tenderness. There is no rebound.   Musculoskeletal: Normal range of motion.         General: No tenderness or edema.   Neurological: She is alert and oriented to person, place, and time. She displays normal reflexes. Coordination normal.   Skin: Skin is warm and dry. She is not diaphoretic. No pallor.   Psychiatric: She has a normal mood and affect. Her behavior is normal. Judgment normal.         Assessment:       1. Palpitations    2. Atrial tachycardia    3. Venous insufficiency    4. Atypical chest pain         Plan:         Atrial tachycardia  Nonsustained per recent 24 hr Holter.  Pt also w/ PVCs and PACs.  Pt denies any further episodes of palpitations.  - continue metoprolol    Palpitations  Resolved.  Frequent episodes.  Echo WNL.    - continue metoprolol    Venous insufficiency  Controlled.  Pt compliant w/ compression stockings and follows w/ vascular surgery.    - continue conservative measures for now      Atypical chest pain  Resolved.  Not worsened by exercise and pt w/ normal functional tolerance > 1 month ago.  Given age will eval for ischemia.  Pt w/ normal ECG.  ETT negative for ischemia, normal exercise tolerance.        Total duration of face to face visit time 30  minutes.  Total time spent counseling greater than fifty percent of total visit time.  Counseling included discussion regarding imaging findings, diagnosis, possibilities, treatment options, risks and benefits.  The patient had many questions regarding the options and long-term effects      Maurice Brooks M.D.  Interventional Cardiology

## 2020-08-17 ENCOUNTER — OFFICE VISIT (OUTPATIENT)
Dept: INTERNAL MEDICINE | Facility: CLINIC | Age: 60
End: 2020-08-17
Payer: COMMERCIAL

## 2020-08-17 ENCOUNTER — LAB VISIT (OUTPATIENT)
Dept: LAB | Facility: HOSPITAL | Age: 60
End: 2020-08-17
Attending: INTERNAL MEDICINE
Payer: COMMERCIAL

## 2020-08-17 VITALS
WEIGHT: 208.75 LBS | DIASTOLIC BLOOD PRESSURE: 80 MMHG | HEIGHT: 66 IN | SYSTOLIC BLOOD PRESSURE: 118 MMHG | HEART RATE: 77 BPM | OXYGEN SATURATION: 99 % | BODY MASS INDEX: 33.55 KG/M2

## 2020-08-17 DIAGNOSIS — R11.0 NAUSEA: ICD-10-CM

## 2020-08-17 DIAGNOSIS — R10.9 ABDOMINAL PAIN, UNSPECIFIED ABDOMINAL LOCATION: Primary | ICD-10-CM

## 2020-08-17 DIAGNOSIS — R10.9 RIGHT FLANK PAIN: ICD-10-CM

## 2020-08-17 DIAGNOSIS — Z98.890 HISTORY OF NISSEN FUNDOPLICATION: ICD-10-CM

## 2020-08-17 DIAGNOSIS — K58.9 IRRITABLE BOWEL SYNDROME, UNSPECIFIED TYPE: ICD-10-CM

## 2020-08-17 LAB
BASOPHILS # BLD AUTO: 0.08 K/UL (ref 0–0.2)
BASOPHILS NFR BLD: 1 % (ref 0–1.9)
BILIRUB UR QL STRIP: NEGATIVE
CLARITY UR REFRACT.AUTO: CLEAR
COLOR UR AUTO: NORMAL
DIFFERENTIAL METHOD: ABNORMAL
EOSINOPHIL # BLD AUTO: 0.2 K/UL (ref 0–0.5)
EOSINOPHIL NFR BLD: 2.7 % (ref 0–8)
ERYTHROCYTE [DISTWIDTH] IN BLOOD BY AUTOMATED COUNT: 12.6 % (ref 11.5–14.5)
GLUCOSE UR QL STRIP: NEGATIVE
HCT VFR BLD AUTO: 41.3 % (ref 37–48.5)
HGB BLD-MCNC: 12.9 G/DL (ref 12–16)
HGB UR QL STRIP: NEGATIVE
IMM GRANULOCYTES # BLD AUTO: 0.03 K/UL (ref 0–0.04)
IMM GRANULOCYTES NFR BLD AUTO: 0.4 % (ref 0–0.5)
KETONES UR QL STRIP: NEGATIVE
LEUKOCYTE ESTERASE UR QL STRIP: NEGATIVE
LYMPHOCYTES # BLD AUTO: 2.1 K/UL (ref 1–4.8)
LYMPHOCYTES NFR BLD: 25.9 % (ref 18–48)
MCH RBC QN AUTO: 29 PG (ref 27–31)
MCHC RBC AUTO-ENTMCNC: 31.2 G/DL (ref 32–36)
MCV RBC AUTO: 93 FL (ref 82–98)
MONOCYTES # BLD AUTO: 1 K/UL (ref 0.3–1)
MONOCYTES NFR BLD: 12 % (ref 4–15)
NEUTROPHILS # BLD AUTO: 4.7 K/UL (ref 1.8–7.7)
NEUTROPHILS NFR BLD: 58 % (ref 38–73)
NITRITE UR QL STRIP: NEGATIVE
NRBC BLD-RTO: 0 /100 WBC
PH UR STRIP: 6 [PH] (ref 5–8)
PLATELET # BLD AUTO: 433 K/UL (ref 150–350)
PMV BLD AUTO: 9.6 FL (ref 9.2–12.9)
PROT UR QL STRIP: NEGATIVE
RBC # BLD AUTO: 4.45 M/UL (ref 4–5.4)
SP GR UR STRIP: 1 (ref 1–1.03)
URN SPEC COLLECT METH UR: NORMAL
WBC # BLD AUTO: 8.07 K/UL (ref 3.9–12.7)

## 2020-08-17 PROCEDURE — 80048 BASIC METABOLIC PNL TOTAL CA: CPT

## 2020-08-17 PROCEDURE — 3008F BODY MASS INDEX DOCD: CPT | Mod: CPTII,S$GLB,, | Performed by: INTERNAL MEDICINE

## 2020-08-17 PROCEDURE — 80076 HEPATIC FUNCTION PANEL: CPT

## 2020-08-17 PROCEDURE — 99214 OFFICE O/P EST MOD 30 MIN: CPT | Mod: S$GLB,,, | Performed by: INTERNAL MEDICINE

## 2020-08-17 PROCEDURE — 99214 PR OFFICE/OUTPT VISIT, EST, LEVL IV, 30-39 MIN: ICD-10-PCS | Mod: S$GLB,,, | Performed by: INTERNAL MEDICINE

## 2020-08-17 PROCEDURE — 99999 PR PBB SHADOW E&M-EST. PATIENT-LVL III: ICD-10-PCS | Mod: PBBFAC,,, | Performed by: INTERNAL MEDICINE

## 2020-08-17 PROCEDURE — 85025 COMPLETE CBC W/AUTO DIFF WBC: CPT

## 2020-08-17 PROCEDURE — 36415 COLL VENOUS BLD VENIPUNCTURE: CPT

## 2020-08-17 PROCEDURE — 81003 URINALYSIS AUTO W/O SCOPE: CPT

## 2020-08-17 PROCEDURE — 3008F PR BODY MASS INDEX (BMI) DOCUMENTED: ICD-10-PCS | Mod: CPTII,S$GLB,, | Performed by: INTERNAL MEDICINE

## 2020-08-17 PROCEDURE — 99999 PR PBB SHADOW E&M-EST. PATIENT-LVL III: CPT | Mod: PBBFAC,,, | Performed by: INTERNAL MEDICINE

## 2020-08-17 RX ORDER — DICYCLOMINE HYDROCHLORIDE 10 MG/1
10 CAPSULE ORAL
Qty: 60 CAPSULE | Refills: 2 | Status: SHIPPED | OUTPATIENT
Start: 2020-08-17 | End: 2020-09-16

## 2020-08-17 NOTE — PROGRESS NOTES
"Subjective:       Patient ID: Rosanna Live is a 60 y.o. female.    Chief Complaint: Follow-up (digestive prob)   This is a 60-year-old who presents today for GI issues she reports she has issues with irritable bowels and they come and go depending on her diet she has followed with GI for that along with her history of Brito's and reports that she has a follow-up that she has scheduled but in the meantime was having some discomfort on the right side intermittently around the back side at [place] she denies urinary symptoms or fevers associated she denies cough or shortness of breath.  She feels that when her bowels are worse it tends to flare up and she uses some over-the-counter medicines for her irritable bowels which she finds does help when she gets issues.  She has had some nausea associated and diarrhea never gets constipated.  She feels her bowels or gurgly a lot.  Biggest concern is that she is having more persistent discomfort on the right side on and off she did have her previous gallbladder removed she gets spasm on occasion.     HPI  Review of Systems   Constitutional: Negative for fever.   Respiratory: Negative for chest tightness and shortness of breath.    Cardiovascular: Negative for chest pain.   Gastrointestinal: Positive for abdominal pain, diarrhea and nausea. Negative for blood in stool.        Bowel issues  irritible bowels  Right upper quadrant back dioscmofort  On and off bloating at times        Objective:     Blood pressure 118/80, pulse 77, height 5' 6" (1.676 m), weight 94.7 kg (208 lb 12.4 oz), SpO2 99 %.    Physical Exam  Constitutional:       General: She is not in acute distress.  HENT:      Head: Normocephalic.   Eyes:      General: No scleral icterus.  Neck:      Musculoskeletal: Neck supple.   Cardiovascular:      Rate and Rhythm: Normal rate and regular rhythm.      Heart sounds: Normal heart sounds. No murmur. No friction rub. No gallop.    Pulmonary:      Effort: " Pulmonary effort is normal. No respiratory distress.      Breath sounds: Normal breath sounds.   Abdominal:      General: Bowel sounds are normal.      Palpations: Abdomen is soft. There is no mass.      Tenderness: There is no abdominal tenderness.      Comments: Active bs    Musculoskeletal:      Comments: Stable edema  Varicose veins    Skin:     Findings: No erythema.   Neurological:      Mental Status: She is alert.         Assessment:       1. Abdominal pain, unspecified abdominal location    2. Right flank pain    3. Nausea    4. History of Nissen fundoplication    5. Irritable bowel syndrome, unspecified type        Plan:       Rosanna Marshall was seen today for follow-up.    Diagnoses and all orders for this visit:    Abdominal pain, unspecified abdominal location  Is discussed with patient increased dietary fiber proceed with ultrasound and review  -     Urinalysis, Reflex to Urine Culture Urine, Clean Catch  -     US Abdomen Complete; Future    Right flank pain  -     US Abdomen Complete; Future    Nausea  Intermittent update blood work  -     Basic metabolic panel; Future  -     CBC auto differential; Future  -     Hepatic function panel; Future    History of Nissen fundoplication    Irritable bowel syndrome, unspecified type    -     dicyclomine (BENTYL) 10 MG capsule; Take 1 capsule (10 mg total) by mouth before meals as needed (cramping).    We discussed dietary measures she has tried to increase her fiber and reduce her sweetener intake she has had no fevers associated will proceed with testing and GI follow-up she has already scheduled is

## 2020-08-18 ENCOUNTER — HOSPITAL ENCOUNTER (OUTPATIENT)
Dept: RADIOLOGY | Facility: HOSPITAL | Age: 60
Discharge: HOME | End: 2020-08-18
Attending: INTERNAL MEDICINE
Payer: COMMERCIAL

## 2020-08-18 DIAGNOSIS — R10.9 ABDOMINAL PAIN, UNSPECIFIED ABDOMINAL LOCATION: ICD-10-CM

## 2020-08-18 DIAGNOSIS — R10.9 RIGHT FLANK PAIN: ICD-10-CM

## 2020-08-18 LAB
ALBUMIN SERPL BCP-MCNC: 3.9 G/DL (ref 3.5–5.2)
ALP SERPL-CCNC: 78 U/L (ref 55–135)
ALT SERPL W/O P-5'-P-CCNC: 12 U/L (ref 10–44)
ANION GAP SERPL CALC-SCNC: 7 MMOL/L (ref 8–16)
AST SERPL-CCNC: 15 U/L (ref 10–40)
BILIRUB DIRECT SERPL-MCNC: 0.2 MG/DL (ref 0.1–0.3)
BILIRUB SERPL-MCNC: 0.3 MG/DL (ref 0.1–1)
BUN SERPL-MCNC: 16 MG/DL (ref 8–23)
CALCIUM SERPL-MCNC: 9.3 MG/DL (ref 8.7–10.5)
CHLORIDE SERPL-SCNC: 108 MMOL/L (ref 95–110)
CO2 SERPL-SCNC: 25 MMOL/L (ref 23–29)
CREAT SERPL-MCNC: 0.7 MG/DL (ref 0.5–1.4)
EST. GFR  (AFRICAN AMERICAN): >60 ML/MIN/1.73 M^2
EST. GFR  (NON AFRICAN AMERICAN): >60 ML/MIN/1.73 M^2
GLUCOSE SERPL-MCNC: 97 MG/DL (ref 70–110)
POTASSIUM SERPL-SCNC: 4.3 MMOL/L (ref 3.5–5.1)
PROT SERPL-MCNC: 7 G/DL (ref 6–8.4)
SODIUM SERPL-SCNC: 140 MMOL/L (ref 136–145)

## 2020-08-18 PROCEDURE — 76700 US EXAM ABDOM COMPLETE: CPT | Mod: TC

## 2020-08-18 PROCEDURE — 76700 US ABDOMEN COMPLETE: ICD-10-PCS | Mod: 26,,, | Performed by: RADIOLOGY

## 2020-08-18 PROCEDURE — 76700 US EXAM ABDOM COMPLETE: CPT | Mod: 26,,, | Performed by: RADIOLOGY

## 2020-09-02 ENCOUNTER — PATIENT OUTREACH (OUTPATIENT)
Dept: ADMINISTRATIVE | Facility: OTHER | Age: 60
End: 2020-09-02

## 2020-09-02 ENCOUNTER — OFFICE VISIT (OUTPATIENT)
Dept: GASTROENTEROLOGY | Facility: CLINIC | Age: 60
End: 2020-09-02
Payer: COMMERCIAL

## 2020-09-02 VITALS
WEIGHT: 208.13 LBS | DIASTOLIC BLOOD PRESSURE: 72 MMHG | HEART RATE: 70 BPM | BODY MASS INDEX: 33.45 KG/M2 | SYSTOLIC BLOOD PRESSURE: 107 MMHG | HEIGHT: 66 IN

## 2020-09-02 DIAGNOSIS — K22.70 BARRETT'S ESOPHAGUS WITHOUT DYSPLASIA: ICD-10-CM

## 2020-09-02 DIAGNOSIS — Z98.890 HISTORY OF NISSEN FUNDOPLICATION: ICD-10-CM

## 2020-09-02 DIAGNOSIS — K59.1 FUNCTIONAL DIARRHEA: ICD-10-CM

## 2020-09-02 DIAGNOSIS — K58.9 IRRITABLE BOWEL SYNDROME, UNSPECIFIED TYPE: Primary | ICD-10-CM

## 2020-09-02 PROCEDURE — 99213 OFFICE O/P EST LOW 20 MIN: CPT | Mod: S$GLB,,, | Performed by: STUDENT IN AN ORGANIZED HEALTH CARE EDUCATION/TRAINING PROGRAM

## 2020-09-02 PROCEDURE — 3008F PR BODY MASS INDEX (BMI) DOCUMENTED: ICD-10-PCS | Mod: CPTII,S$GLB,, | Performed by: STUDENT IN AN ORGANIZED HEALTH CARE EDUCATION/TRAINING PROGRAM

## 2020-09-02 PROCEDURE — 3008F BODY MASS INDEX DOCD: CPT | Mod: CPTII,S$GLB,, | Performed by: STUDENT IN AN ORGANIZED HEALTH CARE EDUCATION/TRAINING PROGRAM

## 2020-09-02 PROCEDURE — 99213 PR OFFICE/OUTPT VISIT, EST, LEVL III, 20-29 MIN: ICD-10-PCS | Mod: S$GLB,,, | Performed by: STUDENT IN AN ORGANIZED HEALTH CARE EDUCATION/TRAINING PROGRAM

## 2020-09-02 PROCEDURE — 99999 PR PBB SHADOW E&M-EST. PATIENT-LVL III: ICD-10-PCS | Mod: PBBFAC,,, | Performed by: STUDENT IN AN ORGANIZED HEALTH CARE EDUCATION/TRAINING PROGRAM

## 2020-09-02 PROCEDURE — 99999 PR PBB SHADOW E&M-EST. PATIENT-LVL III: CPT | Mod: PBBFAC,,, | Performed by: STUDENT IN AN ORGANIZED HEALTH CARE EDUCATION/TRAINING PROGRAM

## 2020-09-02 NOTE — PROGRESS NOTES
LINKS immunization registry updated  Care Everywhere updated  Health Maintenance updated  Chart reviewed for overdue Proactive [facility] ([facility]) health maintenance testing (CRS, Breast Ca, Diabetic Eye Exam)   Orders entered:N/A

## 2020-09-02 NOTE — PROGRESS NOTES
[facility]    Reason for visit: The primary encounter diagnosis was Irritable bowel syndrome, unspecified type. Diagnoses of Brito's esophagus without dysplasia, History of Nissen fundoplication, and Functional diarrhea were also pertinent to this visit.  Referring Provider/PCP: Geena Roman MD    History of Present Illness:  Rosanna Live is a 60 y.o. female with a history of chronic abdominal complaints, history of reflux status post Nissen fundoplication in 2016 who is presenting for follow-up evaluation of chronic symptoms of abdominal pain, nausea, and diarrhea.    Prior history (8/2019):  The patient used to follow with Dr. Barber. She had chronic epigastric abdominal pain and EGD in the past consistent with short segment Brito's, with 6cm hiatal hernia, was on a PPI, and eventually had a Nissen Fundoplication in 2016 for symptomatic hiatal hernia. Repeat EGD in 2017 with HH of 4 cm. She has been off PPI. She was last seen by Dr. Barber in 7/2018, and had no complaints then. She remained on PPI for BE. She has a prior history of CCK.     During that visit, she had complaints of continued abdominal pain. She stated that for the prior year she had intermittent symptoms of epigastric abdominal pain associated with cramping and diarrhea. She stated that she did not notice any provoking factors, and happen ~4-5 /month, lasting 2 days every time. She denies any symptoms in between these episodes. She stated that the pain and diarrhea resolve after she takes Levsin. She does not like medications and that's why she waits 2 days before she takes the medication. She endorses continuous bloating. She states that she did not lose weight, and her appetite has not changed. She states that her symptoms do not wake her from sleep, although they seem to be the worst right after she wakes up. She denies blood or mucous associated with the stool.     We had explained to the  patient that she most likely has IBS.  We recommended that she uses Levsin for her symptoms more frequently, but at that time she was hesitant to take medications and wanted to try more natural products, so we recommended FDgard and IBgard. She was also referred for an EGD     Current history:  EGD was done with small tongue of Brito's, and erythematous gastric mucosa negative for H. Pylori.  Patient reports that since her last appt she has tried IBgard which helped significantly with her symptoms. She has been using it as needed for the past year. She reports that it helps with her cramping abdominal pain and diarrhea. Her diarrhea remains unpredictable, today for example she has been running to the bathroom right after each meal. Over the past few months, her episodes of cramping have been worsening, and IBgard did not seem to be as effective. She reports the cramping is mostly on the right side of her abdomen radiating to the back. She denies associated nausea, vomiting, change in appetite or change in weight. She is on daily Protonix. She is on lexapro as well. She was most recently on Bentyl prescribed by her PCP which helped tremendously with her pain. She is taking it as needed.     PEndoHx:  EGD 2017   Impression:           - Normal examined duodenum.                        - A few gastric polyps. Resected and retrieved.                        - Erythematous mucosa in the gastric body, antrum                         and pylorus. Biopsied.                        - 4 cm hiatus hernia. C0M1. Biopsied.     Colonoscopy 2015  Impression:           - The examined portion of the ileum was normal.                        - Non-bleeding internal hemorrhoids.                        - Diverticulosis in the sigmoid colon and in the                         descending colon.                        - The examination was otherwise normal.                        - Biopsies were taken with a cold forceps from the                          cecum, ascending colon, transverse colon,                         descending colon, sigmoid colon and rectum for                         evaluation of microscopic colitis.    Review of Systems:   Constitutional: no fever, chills or change in weight   Eyes: no visual changes   ENT: no sore throat or dysphagia  Respiratory: no cough or shortness of breath   Cardiovascular: no chest pain or palpitations   Gastrointestinal: as per HPI  Hematologic/Lymphatic: no easy bruising or lymphadenopathy   Musculoskeletal: no arthralgias or myalgias   Neurological: no change in mental status  Behavioral/Psych: no change in mood    Medical History:  Past Medical History:   Diagnosis Date    Allergy     Brito's esophagus     Basal cell cancer     Cataract     Diverticulosis     Edema     chronic right foot     Hiatal hernia 1/31/2017    IBS (irritable bowel syndrome)     Obesity     Rectovaginal fistula 6/28/2012    Vitamin D deficiency disease        Past Surgical History:   Procedure Laterality Date    CHOLECYSTECTOMY      COLONOSCOPY      ESOPHAGOGASTRODUODENOSCOPY      ESOPHAGOGASTRODUODENOSCOPY N/A 9/18/2019    Procedure: EGD (ESOPHAGOGASTRODUODENOSCOPY);  Surgeon: Carlos Manuel Barber MD;  Location: [address];  Service: Endoscopy;  Laterality: N/A;  evaluate fundoplication    INGUINAL HERNIA REPAIR      LAPAROSCOPIC NISSEN FUNDOPLICATION  2/15/16    Mohs' procedure of face      RECTO VAGINAL FISTULA  2011, 2012    UMBILICAL HERNIA REPAIR         Family History   Problem Relation Age of Onset    Arthritis Father         Rhematoid    Hyperlipidemia Father     Cataracts Father     Glaucoma Father     Heart disease Father     Miscarriages / Stillbirths Mother     Diabetes Mother     Hypertension Mother     Glaucoma Mother     Cataracts Mother     Heart disease Maternal Aunt     Heart disease Maternal Grandmother     No Known Problems Sister     No Known Problems Brother     No  Known Problems Maternal Uncle     No Known Problems Paternal Aunt     No Known Problems Paternal Uncle     No Known Problems Maternal Grandfather     No Known Problems Paternal Grandmother     No Known Problems Paternal Grandfather     Breast cancer Neg Hx     Colon cancer Neg Hx     Ovarian cancer Neg Hx        Social History     Socioeconomic History    Marital status:      Spouse name: Not on file    Number of children: Not on file    Years of education: Not on file    Highest education level: Not on file   Occupational History    Occupation: Teacher     Employer: [employer]   Social Needs    Financial resource strain: Not on file    Food insecurity     Worry: Not on file     Inability: Not on file    Transportation needs     Medical: Not on file     Non-medical: Not on file   Tobacco Use    Smoking status: Former Smoker     Packs/day: 0.25     Years: 5.00     Pack years: 1.25     Quit date: 1983     Years since quittin.0    Smokeless tobacco: Never Used   Substance and Sexual Activity    Alcohol use: Yes     Alcohol/week: 1.0 standard drinks     Types: 1 Glasses of wine per week     Comment: Social    Drug use: No    Sexual activity: Never     Partners: Male     Birth control/protection: None, Abstinence   Lifestyle    Physical activity     Days per week: Not on file     Minutes per session: Not on file    Stress: Not on file   Relationships    Social connections     Talks on phone: Not on file     Gets together: Not on file     Attends Sikh service: Not on file     Active member of club or organization: Not on file     Attends meetings of clubs or organizations: Not on file     Relationship status: Not on file   Other Topics Concern    Not on file   Social History Narrative           Current Outpatient Medications on File Prior to Visit   Medication Sig Dispense Refill    dicyclomine (BENTYL) 10 MG capsule Take 1 capsule (10 mg total) by  mouth before meals as needed (cramping). 60 capsule 2    escitalopram oxalate (LEXAPRO) 5 MG Tab Take 1 tablet (5 mg total) by mouth once daily. 90 tablet 3    metoprolol tartrate (LOPRESSOR) 25 MG tablet Take 0.5 tablets (12.5 mg total) by mouth 2 (two) times daily. 30 tablet 11    pantoprazole (PROTONIX) 40 MG tablet Take 1 tablet (40 mg total) by mouth before breakfast. Take one pill every morning 45 minutes before breakfast in the morning. 90 tablet 3     No current facility-administered medications on file prior to visit.        Review of patient's allergies indicates:   Allergen Reactions    Penicillins Rash       Physical Exam:  General: Alert and Oriented x3, no distress   Vitals:    09/02/20 1332   BP: 107/72   Pulse: 70     HEENT: Normocephalic, Atraumatic. No scleral icterus.  Lymph: No cervical lymphadenopathy  Resp: Good air entry bilaterally, no adventitious sounds.  Cardiac: S1 and S2 normal.  Abdomen: Normoactive bowel sounds. Non-distended. Normal tympany. Soft. Non-tender. No peritoneal signs.  Extremities: No peripheral edema. Normal bilateral pedal and radial pulses.  Neurologic: No gross neurological Deficits  Psych: Calm, cooperative. Normal mood and affect.    Laboratory:  Lab Results   Component Value Date     08/17/2020    K 4.3 08/17/2020     08/17/2020    CO2 25 08/17/2020    BUN 16 08/17/2020    CREATININE 0.7 08/17/2020    CALCIUM 9.3 08/17/2020    ANIONGAP 7 (L) 08/17/2020    ESTGFRAFRICA >60.0 08/17/2020    EGFRNONAA >60.0 08/17/2020       Lab Results   Component Value Date    ALT 12 08/17/2020    AST 15 08/17/2020    ALKPHOS 78 08/17/2020    BILITOT 0.3 08/17/2020       Lab Results   Component Value Date    WBC 8.07 08/17/2020    HGB 12.9 08/17/2020    HCT 41.3 08/17/2020    MCV 93 08/17/2020     (H) 08/17/2020       Microbiology:  No Pertinent Microbiology    Imaging:  No Pertinent Imaging    Assessment:  Rosanna Live is a 60 y.o. female who is  presenting for follow-up evaluation of chronic abdominal pain    Patient underwent extensive evaluation for abdominal pain and diarrhea in the past. She does not have any alarming signs, and seems to be doing well now with IBgard and bentyl, therefore we will defer further evaluation.     Surveillance with EGD for Brito's as recommended by Dr. Barber    Plan:  1. Continue IBgard, bentyl as needed  2. EGD in 3 years for BE surveillance  3. Continue protonix  4. CRC Screenin  Follow up if symptoms worsen or fail to improve.    Elli Singh MD  Gastroenterology Fellow  Phone: [phone]    No orders of the defined types were placed in this encounter.

## 2021-01-11 ENCOUNTER — IMMUNIZATION (OUTPATIENT)
Dept: PHARMACY | Facility: CLINIC | Age: 61
End: 2021-01-11
Payer: COMMERCIAL

## 2021-01-11 ENCOUNTER — OFFICE VISIT (OUTPATIENT)
Dept: INTERNAL MEDICINE | Facility: CLINIC | Age: 61
End: 2021-01-11
Payer: COMMERCIAL

## 2021-01-11 VITALS
DIASTOLIC BLOOD PRESSURE: 72 MMHG | HEIGHT: 66 IN | OXYGEN SATURATION: 97 % | BODY MASS INDEX: 34.37 KG/M2 | HEART RATE: 73 BPM | SYSTOLIC BLOOD PRESSURE: 120 MMHG | WEIGHT: 213.88 LBS

## 2021-01-11 DIAGNOSIS — Z71.85 VACCINE COUNSELING: ICD-10-CM

## 2021-01-11 DIAGNOSIS — Z23 NEED FOR IMMUNIZATION AGAINST INFLUENZA: Primary | ICD-10-CM

## 2021-01-11 DIAGNOSIS — H10.13 ALLERGIC CONJUNCTIVITIS OF BOTH EYES: ICD-10-CM

## 2021-01-11 PROCEDURE — 3008F PR BODY MASS INDEX (BMI) DOCUMENTED: ICD-10-PCS | Mod: CPTII,S$GLB,, | Performed by: PHYSICIAN ASSISTANT

## 2021-01-11 PROCEDURE — 1126F AMNT PAIN NOTED NONE PRSNT: CPT | Mod: S$GLB,,, | Performed by: PHYSICIAN ASSISTANT

## 2021-01-11 PROCEDURE — 99999 PR PBB SHADOW E&M-EST. PATIENT-LVL III: ICD-10-PCS | Mod: PBBFAC,,, | Performed by: PHYSICIAN ASSISTANT

## 2021-01-11 PROCEDURE — 3008F BODY MASS INDEX DOCD: CPT | Mod: CPTII,S$GLB,, | Performed by: PHYSICIAN ASSISTANT

## 2021-01-11 PROCEDURE — 99999 PR PBB SHADOW E&M-EST. PATIENT-LVL III: CPT | Mod: PBBFAC,,, | Performed by: PHYSICIAN ASSISTANT

## 2021-01-11 PROCEDURE — 99213 OFFICE O/P EST LOW 20 MIN: CPT | Mod: S$GLB,,, | Performed by: PHYSICIAN ASSISTANT

## 2021-01-11 PROCEDURE — 99213 PR OFFICE/OUTPT VISIT, EST, LEVL III, 20-29 MIN: ICD-10-PCS | Mod: S$GLB,,, | Performed by: PHYSICIAN ASSISTANT

## 2021-01-11 PROCEDURE — 1126F PR PAIN SEVERITY QUANTIFIED, NO PAIN PRESENT: ICD-10-PCS | Mod: S$GLB,,, | Performed by: PHYSICIAN ASSISTANT

## 2021-06-10 ENCOUNTER — OFFICE VISIT (OUTPATIENT)
Dept: URGENT CARE | Facility: CLINIC | Age: 61
End: 2021-06-10
Payer: COMMERCIAL

## 2021-06-10 VITALS
HEART RATE: 80 BPM | BODY MASS INDEX: 34.23 KG/M2 | RESPIRATION RATE: 16 BRPM | HEIGHT: 66 IN | WEIGHT: 213 LBS | TEMPERATURE: 98 F | DIASTOLIC BLOOD PRESSURE: 61 MMHG | OXYGEN SATURATION: 96 % | SYSTOLIC BLOOD PRESSURE: 122 MMHG

## 2021-06-10 DIAGNOSIS — R05.9 COUGH: ICD-10-CM

## 2021-06-10 DIAGNOSIS — J02.9 SORE THROAT: ICD-10-CM

## 2021-06-10 DIAGNOSIS — R68.83 CHILLS (WITHOUT FEVER): ICD-10-CM

## 2021-06-10 DIAGNOSIS — J01.00 ACUTE MAXILLARY SINUSITIS, RECURRENCE NOT SPECIFIED: Primary | ICD-10-CM

## 2021-06-10 LAB
CTP QC/QA: YES
CTP QC/QA: YES
MOLECULAR STREP A: NEGATIVE
SARS-COV-2 RDRP RESP QL NAA+PROBE: NEGATIVE

## 2021-06-10 PROCEDURE — 3008F PR BODY MASS INDEX (BMI) DOCUMENTED: ICD-10-PCS | Mod: CPTII,S$GLB,, | Performed by: PHYSICIAN ASSISTANT

## 2021-06-10 PROCEDURE — 1126F PR PAIN SEVERITY QUANTIFIED, NO PAIN PRESENT: ICD-10-PCS | Mod: S$GLB,,, | Performed by: PHYSICIAN ASSISTANT

## 2021-06-10 PROCEDURE — U0002: ICD-10-PCS | Mod: QW,S$GLB,, | Performed by: PHYSICIAN ASSISTANT

## 2021-06-10 PROCEDURE — 87651 STREP A DNA AMP PROBE: CPT | Mod: QW,S$GLB,, | Performed by: PHYSICIAN ASSISTANT

## 2021-06-10 PROCEDURE — 99214 PR OFFICE/OUTPT VISIT, EST, LEVL IV, 30-39 MIN: ICD-10-PCS | Mod: S$GLB,,, | Performed by: PHYSICIAN ASSISTANT

## 2021-06-10 PROCEDURE — 99214 OFFICE O/P EST MOD 30 MIN: CPT | Mod: S$GLB,,, | Performed by: PHYSICIAN ASSISTANT

## 2021-06-10 PROCEDURE — U0002 COVID-19 LAB TEST NON-CDC: HCPCS | Mod: QW,S$GLB,, | Performed by: PHYSICIAN ASSISTANT

## 2021-06-10 PROCEDURE — 87651 POCT STREP A MOLECULAR: ICD-10-PCS | Mod: QW,S$GLB,, | Performed by: PHYSICIAN ASSISTANT

## 2021-06-10 PROCEDURE — 1126F AMNT PAIN NOTED NONE PRSNT: CPT | Mod: S$GLB,,, | Performed by: PHYSICIAN ASSISTANT

## 2021-06-10 PROCEDURE — 3008F BODY MASS INDEX DOCD: CPT | Mod: CPTII,S$GLB,, | Performed by: PHYSICIAN ASSISTANT

## 2021-06-10 RX ORDER — AZITHROMYCIN 250 MG/1
TABLET, FILM COATED ORAL
Qty: 6 TABLET | Refills: 0 | Status: SHIPPED | OUTPATIENT
Start: 2021-06-10 | End: 2021-06-15

## 2021-06-10 RX ORDER — PROMETHAZINE HYDROCHLORIDE AND DEXTROMETHORPHAN HYDROBROMIDE 6.25; 15 MG/5ML; MG/5ML
5 SYRUP ORAL EVERY 8 HOURS PRN
Qty: 118 ML | Refills: 0 | Status: SHIPPED | OUTPATIENT
Start: 2021-06-10 | End: 2021-06-20

## 2021-06-29 ENCOUNTER — OFFICE VISIT (OUTPATIENT)
Dept: OPTOMETRY | Facility: CLINIC | Age: 61
End: 2021-06-29
Payer: COMMERCIAL

## 2021-06-29 DIAGNOSIS — H52.4 HYPEROPIA OF BOTH EYES WITH REGULAR ASTIGMATISM AND PRESBYOPIA: ICD-10-CM

## 2021-06-29 DIAGNOSIS — H52.03 HYPEROPIA OF BOTH EYES WITH REGULAR ASTIGMATISM AND PRESBYOPIA: ICD-10-CM

## 2021-06-29 DIAGNOSIS — H25.13 NUCLEAR SCLEROSIS, BILATERAL: Primary | ICD-10-CM

## 2021-06-29 DIAGNOSIS — H52.223 HYPEROPIA OF BOTH EYES WITH REGULAR ASTIGMATISM AND PRESBYOPIA: ICD-10-CM

## 2021-06-29 PROCEDURE — 1126F AMNT PAIN NOTED NONE PRSNT: CPT | Mod: S$GLB,,, | Performed by: OPTOMETRIST

## 2021-06-29 PROCEDURE — 92014 COMPRE OPH EXAM EST PT 1/>: CPT | Mod: S$GLB,,, | Performed by: OPTOMETRIST

## 2021-06-29 PROCEDURE — 92015 PR REFRACTION: ICD-10-PCS | Mod: S$GLB,,, | Performed by: OPTOMETRIST

## 2021-06-29 PROCEDURE — 99999 PR PBB SHADOW E&M-EST. PATIENT-LVL II: CPT | Mod: PBBFAC,,, | Performed by: OPTOMETRIST

## 2021-06-29 PROCEDURE — 1126F PR PAIN SEVERITY QUANTIFIED, NO PAIN PRESENT: ICD-10-PCS | Mod: S$GLB,,, | Performed by: OPTOMETRIST

## 2021-06-29 PROCEDURE — 99999 PR PBB SHADOW E&M-EST. PATIENT-LVL II: ICD-10-PCS | Mod: PBBFAC,,, | Performed by: OPTOMETRIST

## 2021-06-29 PROCEDURE — 92015 DETERMINE REFRACTIVE STATE: CPT | Mod: S$GLB,,, | Performed by: OPTOMETRIST

## 2021-06-29 PROCEDURE — 92014 PR EYE EXAM, EST PATIENT,COMPREHESV: ICD-10-PCS | Mod: S$GLB,,, | Performed by: OPTOMETRIST

## 2021-07-21 ENCOUNTER — OFFICE VISIT (OUTPATIENT)
Dept: OBSTETRICS AND GYNECOLOGY | Facility: CLINIC | Age: 61
End: 2021-07-21
Payer: COMMERCIAL

## 2021-07-21 VITALS
DIASTOLIC BLOOD PRESSURE: 76 MMHG | BODY MASS INDEX: 34.28 KG/M2 | WEIGHT: 212.44 LBS | SYSTOLIC BLOOD PRESSURE: 132 MMHG

## 2021-07-21 DIAGNOSIS — N95.9 MENOPAUSAL AND PERIMENOPAUSAL DISORDER: ICD-10-CM

## 2021-07-21 DIAGNOSIS — Z12.31 ENCOUNTER FOR SCREENING MAMMOGRAM FOR MALIGNANT NEOPLASM OF BREAST: ICD-10-CM

## 2021-07-21 DIAGNOSIS — Z01.419 VISIT FOR GYNECOLOGIC EXAMINATION: Primary | ICD-10-CM

## 2021-07-21 PROCEDURE — 3008F PR BODY MASS INDEX (BMI) DOCUMENTED: ICD-10-PCS | Mod: CPTII,S$GLB,, | Performed by: OBSTETRICS & GYNECOLOGY

## 2021-07-21 PROCEDURE — 1126F AMNT PAIN NOTED NONE PRSNT: CPT | Mod: CPTII,S$GLB,, | Performed by: OBSTETRICS & GYNECOLOGY

## 2021-07-21 PROCEDURE — 99396 PR PREVENTIVE VISIT,EST,40-64: ICD-10-PCS | Mod: S$GLB,,, | Performed by: OBSTETRICS & GYNECOLOGY

## 2021-07-21 PROCEDURE — 99999 PR PBB SHADOW E&M-EST. PATIENT-LVL III: CPT | Mod: PBBFAC,,, | Performed by: OBSTETRICS & GYNECOLOGY

## 2021-07-21 PROCEDURE — 99999 PR PBB SHADOW E&M-EST. PATIENT-LVL III: ICD-10-PCS | Mod: PBBFAC,,, | Performed by: OBSTETRICS & GYNECOLOGY

## 2021-07-21 PROCEDURE — 1126F PR PAIN SEVERITY QUANTIFIED, NO PAIN PRESENT: ICD-10-PCS | Mod: CPTII,S$GLB,, | Performed by: OBSTETRICS & GYNECOLOGY

## 2021-07-21 PROCEDURE — 3008F BODY MASS INDEX DOCD: CPT | Mod: CPTII,S$GLB,, | Performed by: OBSTETRICS & GYNECOLOGY

## 2021-07-21 PROCEDURE — 99396 PREV VISIT EST AGE 40-64: CPT | Mod: S$GLB,,, | Performed by: OBSTETRICS & GYNECOLOGY

## 2021-08-09 ENCOUNTER — HOSPITAL ENCOUNTER (OUTPATIENT)
Dept: RADIOLOGY | Facility: HOSPITAL | Age: 61
Discharge: HOME | End: 2021-08-09
Attending: OBSTETRICS & GYNECOLOGY
Payer: COMMERCIAL

## 2021-08-09 DIAGNOSIS — Z12.31 ENCOUNTER FOR SCREENING MAMMOGRAM FOR MALIGNANT NEOPLASM OF BREAST: ICD-10-CM

## 2021-08-09 PROCEDURE — 77067 SCR MAMMO BI INCL CAD: CPT | Mod: TC

## 2021-08-09 PROCEDURE — 77067 SCR MAMMO BI INCL CAD: CPT | Mod: 26,,, | Performed by: RADIOLOGY

## 2021-08-09 PROCEDURE — 77063 BREAST TOMOSYNTHESIS BI: CPT | Mod: 26,,, | Performed by: RADIOLOGY

## 2021-08-09 PROCEDURE — 77067 MAMMO DIGITAL SCREENING BILAT WITH TOMO: ICD-10-PCS | Mod: 26,,, | Performed by: RADIOLOGY

## 2021-08-09 PROCEDURE — 77063 MAMMO DIGITAL SCREENING BILAT WITH TOMO: ICD-10-PCS | Mod: 26,,, | Performed by: RADIOLOGY

## 2022-01-26 ENCOUNTER — PATIENT MESSAGE (OUTPATIENT)
Dept: ADMINISTRATIVE | Facility: HOSPITAL | Age: 62
End: 2022-01-26
Payer: COMMERCIAL

## 2022-02-11 ENCOUNTER — OFFICE VISIT (OUTPATIENT)
Dept: INTERNAL MEDICINE | Facility: CLINIC | Age: 62
End: 2022-02-11
Payer: COMMERCIAL

## 2022-02-11 VITALS
DIASTOLIC BLOOD PRESSURE: 76 MMHG | HEIGHT: 66 IN | WEIGHT: 208.31 LBS | SYSTOLIC BLOOD PRESSURE: 132 MMHG | BODY MASS INDEX: 33.48 KG/M2 | HEART RATE: 70 BPM | OXYGEN SATURATION: 98 %

## 2022-02-11 DIAGNOSIS — J32.9 SINUSITIS, UNSPECIFIED CHRONICITY, UNSPECIFIED LOCATION: ICD-10-CM

## 2022-02-11 DIAGNOSIS — Z00.00 ANNUAL PHYSICAL EXAM: Primary | ICD-10-CM

## 2022-02-11 DIAGNOSIS — J30.9 ALLERGIC RHINITIS, UNSPECIFIED SEASONALITY, UNSPECIFIED TRIGGER: Primary | ICD-10-CM

## 2022-02-11 PROCEDURE — 3075F SYST BP GE 130 - 139MM HG: CPT | Mod: CPTII,S$GLB,, | Performed by: INTERNAL MEDICINE

## 2022-02-11 PROCEDURE — 1159F PR MEDICATION LIST DOCUMENTED IN MEDICAL RECORD: ICD-10-PCS | Mod: CPTII,S$GLB,, | Performed by: INTERNAL MEDICINE

## 2022-02-11 PROCEDURE — 99999 PR PBB SHADOW E&M-EST. PATIENT-LVL III: ICD-10-PCS | Mod: PBBFAC,,, | Performed by: INTERNAL MEDICINE

## 2022-02-11 PROCEDURE — 99999 PR PBB SHADOW E&M-EST. PATIENT-LVL III: CPT | Mod: PBBFAC,,, | Performed by: INTERNAL MEDICINE

## 2022-02-11 PROCEDURE — 3078F DIAST BP <80 MM HG: CPT | Mod: CPTII,S$GLB,, | Performed by: INTERNAL MEDICINE

## 2022-02-11 PROCEDURE — 99213 PR OFFICE/OUTPT VISIT, EST, LEVL III, 20-29 MIN: ICD-10-PCS | Mod: S$GLB,,, | Performed by: INTERNAL MEDICINE

## 2022-02-11 PROCEDURE — 3075F PR MOST RECENT SYSTOLIC BLOOD PRESS GE 130-139MM HG: ICD-10-PCS | Mod: CPTII,S$GLB,, | Performed by: INTERNAL MEDICINE

## 2022-02-11 PROCEDURE — 3078F PR MOST RECENT DIASTOLIC BLOOD PRESSURE < 80 MM HG: ICD-10-PCS | Mod: CPTII,S$GLB,, | Performed by: INTERNAL MEDICINE

## 2022-02-11 PROCEDURE — 1160F PR REVIEW ALL MEDS BY PRESCRIBER/CLIN PHARMACIST DOCUMENTED: ICD-10-PCS | Mod: CPTII,S$GLB,, | Performed by: INTERNAL MEDICINE

## 2022-02-11 PROCEDURE — 1159F MED LIST DOCD IN RCRD: CPT | Mod: CPTII,S$GLB,, | Performed by: INTERNAL MEDICINE

## 2022-02-11 PROCEDURE — 99213 OFFICE O/P EST LOW 20 MIN: CPT | Mod: S$GLB,,, | Performed by: INTERNAL MEDICINE

## 2022-02-11 PROCEDURE — 3008F PR BODY MASS INDEX (BMI) DOCUMENTED: ICD-10-PCS | Mod: CPTII,S$GLB,, | Performed by: INTERNAL MEDICINE

## 2022-02-11 PROCEDURE — 3008F BODY MASS INDEX DOCD: CPT | Mod: CPTII,S$GLB,, | Performed by: INTERNAL MEDICINE

## 2022-02-11 PROCEDURE — 1160F RVW MEDS BY RX/DR IN RCRD: CPT | Mod: CPTII,S$GLB,, | Performed by: INTERNAL MEDICINE

## 2022-02-11 RX ORDER — OMEPRAZOLE 20 MG/1
20 CAPSULE, DELAYED RELEASE ORAL DAILY
COMMUNITY
End: 2022-09-07

## 2022-02-11 RX ORDER — AZITHROMYCIN 250 MG/1
TABLET, FILM COATED ORAL
Qty: 6 TABLET | Refills: 0 | Status: SHIPPED | OUTPATIENT
Start: 2022-02-11 | End: 2022-04-11

## 2022-02-11 NOTE — PROGRESS NOTES
Answers for HPI/ROS submitted by the patient on 2/8/2022  activity change: No  unexpected weight change: No  neck pain: No  hearing loss: No  rhinorrhea: Yes  trouble swallowing: No  eye discharge: Yes  visual disturbance: No  chest tightness: No  wheezing: No  chest pain: No  palpitations: No  blood in stool: No  constipation: No  vomiting: No  diarrhea: No  polydipsia: No  polyuria: No  difficulty urinating: No  hematuria: No  menstrual problem: No  dysuria: No  joint swelling: No  arthralgias: No  headaches: No  weakness: No  confusion: No  dysphoric mood: No    Subjective:       Patient ID: Rosanna Live is a 61 y.o. female.    Chief Complaint: Follow-up  This is a 61-year-old who presents today for follow-up sinusitis she has been having some trouble with her allergies recently nasal congestion and drip and it has been going on for few weeks she has tried conservative measures with no improvement and feels that she is in need of treatment.  She is using Mucinex .  She has had no cough or fevers mostly congestion and sinus drip facial pressure patient notes since last visit she stopped her Protonix prescribed by GI for her GERD and history of Brito's she reports he was bothering her stomach she had previously taken Prilosec before we discussed she should consider resuming that due to her history    HPI  Review of Systems   Constitutional: Negative for activity change, fever and unexpected weight change.   HENT: Positive for rhinorrhea, sinus pressure and sinus pain. Negative for hearing loss and trouble swallowing.    Eyes: Positive for discharge. Negative for visual disturbance.   Respiratory: Negative for chest tightness and wheezing.    Cardiovascular: Negative for chest pain and palpitations.   Gastrointestinal: Negative for blood in stool, constipation, diarrhea and vomiting.   Endocrine: Negative for polydipsia and polyuria.   Genitourinary: Negative for difficulty urinating, dysuria,  "hematuria and menstrual problem.   Musculoskeletal: Negative for arthralgias, joint swelling and neck pain.   Neurological: Negative for weakness and headaches.   Psychiatric/Behavioral: Negative for confusion and dysphoric mood.       Objective:     Blood pressure 132/76, pulse 70, height 5' 6" (1.676 m), weight 94.5 kg (208 lb 5.4 oz), SpO2 98 %.    Physical Exam  Constitutional:       General: She is not in acute distress.  HENT:      Head: Normocephalic.      Comments: Sinusitis  Congestion   Tm dull      Mouth/Throat:      Mouth: Oropharynx is clear and moist.   Eyes:      General: No scleral icterus.  Cardiovascular:      Rate and Rhythm: Normal rate and regular rhythm.      Heart sounds: Normal heart sounds. No murmur heard.  No friction rub. No gallop.    Pulmonary:      Effort: Pulmonary effort is normal. No respiratory distress.      Breath sounds: Normal breath sounds.   Abdominal:      General: Bowel sounds are normal.      Palpations: Abdomen is soft. There is no mass.      Tenderness: There is no abdominal tenderness.   Musculoskeletal:         General: No edema.      Cervical back: Neck supple.      Comments: Stable edema  Varicose veins   Skin:     Findings: No erythema.   Neurological:      Mental Status: She is alert.   Psychiatric:         Mood and Affect: Mood and affect normal.         Assessment:       1. Allergic rhinitis, unspecified seasonality, unspecified trigger    2. Sinusitis, unspecified chronicity, unspecified location        Plan:       Rosanna Marshall was seen today for follow-up.    Diagnoses and all orders for this visit:    Allergic rhinitis, unspecified seasonality, unspecified trigger  Sinusitis, unspecified chronicity, unspecified location  Will hold on steroids coq she can do saline spray Flonase and will treat  -     azithromycin (Z-ANABELL) 250 MG tablet; Take two tablets  First day then one tablet daily to complete course    gerd hx barrets  Patient stop Protonix on her own " discussed she may benefit from remaining on proton pump inhibitor such as Prilosec if tolerated    She will schedule her annual physical with blood work prior  Recommended flu and COVID booster patient plans to schedule

## 2022-04-07 ENCOUNTER — LAB VISIT (OUTPATIENT)
Dept: LAB | Facility: HOSPITAL | Age: 62
End: 2022-04-07
Attending: INTERNAL MEDICINE
Payer: COMMERCIAL

## 2022-04-07 DIAGNOSIS — Z00.00 ANNUAL PHYSICAL EXAM: ICD-10-CM

## 2022-04-07 LAB
ALBUMIN SERPL BCP-MCNC: 3.7 G/DL (ref 3.5–5.2)
ALP SERPL-CCNC: 98 U/L (ref 55–135)
ALT SERPL W/O P-5'-P-CCNC: 19 U/L (ref 10–44)
ANION GAP SERPL CALC-SCNC: 8 MMOL/L (ref 8–16)
AST SERPL-CCNC: 19 U/L (ref 10–40)
BASOPHILS # BLD AUTO: 0.06 K/UL (ref 0–0.2)
BASOPHILS NFR BLD: 0.7 % (ref 0–1.9)
BILIRUB SERPL-MCNC: 0.6 MG/DL (ref 0.1–1)
BUN SERPL-MCNC: 13 MG/DL (ref 8–23)
CALCIUM SERPL-MCNC: 9.8 MG/DL (ref 8.7–10.5)
CHLORIDE SERPL-SCNC: 104 MMOL/L (ref 95–110)
CHOLEST SERPL-MCNC: 234 MG/DL (ref 120–199)
CHOLEST/HDLC SERPL: 3.8 {RATIO} (ref 2–5)
CO2 SERPL-SCNC: 27 MMOL/L (ref 23–29)
CREAT SERPL-MCNC: 0.7 MG/DL (ref 0.5–1.4)
DIFFERENTIAL METHOD: ABNORMAL
EOSINOPHIL # BLD AUTO: 0.3 K/UL (ref 0–0.5)
EOSINOPHIL NFR BLD: 3.2 % (ref 0–8)
ERYTHROCYTE [DISTWIDTH] IN BLOOD BY AUTOMATED COUNT: 12.4 % (ref 11.5–14.5)
EST. GFR  (AFRICAN AMERICAN): >60 ML/MIN/1.73 M^2
EST. GFR  (NON AFRICAN AMERICAN): >60 ML/MIN/1.73 M^2
ESTIMATED AVG GLUCOSE: 114 MG/DL (ref 68–131)
GLUCOSE SERPL-MCNC: 98 MG/DL (ref 70–110)
HBA1C MFR BLD: 5.6 % (ref 4–5.6)
HCT VFR BLD AUTO: 44 % (ref 37–48.5)
HDLC SERPL-MCNC: 62 MG/DL (ref 40–75)
HDLC SERPL: 26.5 % (ref 20–50)
HGB BLD-MCNC: 13.7 G/DL (ref 12–16)
IMM GRANULOCYTES # BLD AUTO: 0.07 K/UL (ref 0–0.04)
IMM GRANULOCYTES NFR BLD AUTO: 0.8 % (ref 0–0.5)
LDLC SERPL CALC-MCNC: 152.8 MG/DL (ref 63–159)
LYMPHOCYTES # BLD AUTO: 2.1 K/UL (ref 1–4.8)
LYMPHOCYTES NFR BLD: 24.3 % (ref 18–48)
MCH RBC QN AUTO: 28.8 PG (ref 27–31)
MCHC RBC AUTO-ENTMCNC: 31.1 G/DL (ref 32–36)
MCV RBC AUTO: 93 FL (ref 82–98)
MONOCYTES # BLD AUTO: 1.1 K/UL (ref 0.3–1)
MONOCYTES NFR BLD: 12.3 % (ref 4–15)
NEUTROPHILS # BLD AUTO: 5 K/UL (ref 1.8–7.7)
NEUTROPHILS NFR BLD: 58.7 % (ref 38–73)
NONHDLC SERPL-MCNC: 172 MG/DL
NRBC BLD-RTO: 0 /100 WBC
PLATELET # BLD AUTO: 426 K/UL (ref 150–450)
PMV BLD AUTO: 9.2 FL (ref 9.2–12.9)
POTASSIUM SERPL-SCNC: 4.6 MMOL/L (ref 3.5–5.1)
PROT SERPL-MCNC: 7.3 G/DL (ref 6–8.4)
RBC # BLD AUTO: 4.75 M/UL (ref 4–5.4)
SODIUM SERPL-SCNC: 139 MMOL/L (ref 136–145)
TRIGL SERPL-MCNC: 96 MG/DL (ref 30–150)
TSH SERPL DL<=0.005 MIU/L-ACNC: 1.11 UIU/ML (ref 0.4–4)
WBC # BLD AUTO: 8.57 K/UL (ref 3.9–12.7)

## 2022-04-07 PROCEDURE — 80053 COMPREHEN METABOLIC PANEL: CPT | Performed by: INTERNAL MEDICINE

## 2022-04-07 PROCEDURE — 84443 ASSAY THYROID STIM HORMONE: CPT | Performed by: INTERNAL MEDICINE

## 2022-04-07 PROCEDURE — 83036 HEMOGLOBIN GLYCOSYLATED A1C: CPT | Performed by: INTERNAL MEDICINE

## 2022-04-07 PROCEDURE — 36415 COLL VENOUS BLD VENIPUNCTURE: CPT | Performed by: INTERNAL MEDICINE

## 2022-04-07 PROCEDURE — 80061 LIPID PANEL: CPT | Performed by: INTERNAL MEDICINE

## 2022-04-07 PROCEDURE — 85025 COMPLETE CBC W/AUTO DIFF WBC: CPT | Performed by: INTERNAL MEDICINE

## 2022-04-11 ENCOUNTER — TELEPHONE (OUTPATIENT)
Dept: GASTROENTEROLOGY | Facility: CLINIC | Age: 62
End: 2022-04-11
Payer: COMMERCIAL

## 2022-04-11 ENCOUNTER — OFFICE VISIT (OUTPATIENT)
Dept: INTERNAL MEDICINE | Facility: CLINIC | Age: 62
End: 2022-04-11
Payer: COMMERCIAL

## 2022-04-11 VITALS
OXYGEN SATURATION: 97 % | BODY MASS INDEX: 33.45 KG/M2 | HEIGHT: 66 IN | WEIGHT: 208.13 LBS | SYSTOLIC BLOOD PRESSURE: 132 MMHG | DIASTOLIC BLOOD PRESSURE: 80 MMHG | HEART RATE: 71 BPM

## 2022-04-11 DIAGNOSIS — K22.70 BARRETT'S ESOPHAGUS WITHOUT DYSPLASIA: Primary | ICD-10-CM

## 2022-04-11 DIAGNOSIS — R00.2 PALPITATIONS: ICD-10-CM

## 2022-04-11 DIAGNOSIS — Z12.11 ENCOUNTER FOR SCREENING COLONOSCOPY: ICD-10-CM

## 2022-04-11 DIAGNOSIS — Z00.00 ANNUAL PHYSICAL EXAM: Primary | ICD-10-CM

## 2022-04-11 DIAGNOSIS — N64.4 BREAST PAIN: ICD-10-CM

## 2022-04-11 DIAGNOSIS — K22.70 BARRETT'S ESOPHAGUS WITHOUT DYSPLASIA: ICD-10-CM

## 2022-04-11 DIAGNOSIS — K21.9 GASTROESOPHAGEAL REFLUX DISEASE, UNSPECIFIED WHETHER ESOPHAGITIS PRESENT: ICD-10-CM

## 2022-04-11 PROCEDURE — 3008F PR BODY MASS INDEX (BMI) DOCUMENTED: ICD-10-PCS | Mod: CPTII,S$GLB,, | Performed by: INTERNAL MEDICINE

## 2022-04-11 PROCEDURE — 1159F MED LIST DOCD IN RCRD: CPT | Mod: CPTII,S$GLB,, | Performed by: INTERNAL MEDICINE

## 2022-04-11 PROCEDURE — 99396 PREV VISIT EST AGE 40-64: CPT | Mod: S$GLB,,, | Performed by: INTERNAL MEDICINE

## 2022-04-11 PROCEDURE — 99396 PR PREVENTIVE VISIT,EST,40-64: ICD-10-PCS | Mod: S$GLB,,, | Performed by: INTERNAL MEDICINE

## 2022-04-11 PROCEDURE — 1159F PR MEDICATION LIST DOCUMENTED IN MEDICAL RECORD: ICD-10-PCS | Mod: CPTII,S$GLB,, | Performed by: INTERNAL MEDICINE

## 2022-04-11 PROCEDURE — 3075F PR MOST RECENT SYSTOLIC BLOOD PRESS GE 130-139MM HG: ICD-10-PCS | Mod: CPTII,S$GLB,, | Performed by: INTERNAL MEDICINE

## 2022-04-11 PROCEDURE — 3079F PR MOST RECENT DIASTOLIC BLOOD PRESSURE 80-89 MM HG: ICD-10-PCS | Mod: CPTII,S$GLB,, | Performed by: INTERNAL MEDICINE

## 2022-04-11 PROCEDURE — 3044F HG A1C LEVEL LT 7.0%: CPT | Mod: CPTII,S$GLB,, | Performed by: INTERNAL MEDICINE

## 2022-04-11 PROCEDURE — 99999 PR PBB SHADOW E&M-EST. PATIENT-LVL IV: CPT | Mod: PBBFAC,,, | Performed by: INTERNAL MEDICINE

## 2022-04-11 PROCEDURE — 3079F DIAST BP 80-89 MM HG: CPT | Mod: CPTII,S$GLB,, | Performed by: INTERNAL MEDICINE

## 2022-04-11 PROCEDURE — 3075F SYST BP GE 130 - 139MM HG: CPT | Mod: CPTII,S$GLB,, | Performed by: INTERNAL MEDICINE

## 2022-04-11 PROCEDURE — 3044F PR MOST RECENT HEMOGLOBIN A1C LEVEL <7.0%: ICD-10-PCS | Mod: CPTII,S$GLB,, | Performed by: INTERNAL MEDICINE

## 2022-04-11 PROCEDURE — 99999 PR PBB SHADOW E&M-EST. PATIENT-LVL IV: ICD-10-PCS | Mod: PBBFAC,,, | Performed by: INTERNAL MEDICINE

## 2022-04-11 PROCEDURE — 3008F BODY MASS INDEX DOCD: CPT | Mod: CPTII,S$GLB,, | Performed by: INTERNAL MEDICINE

## 2022-04-11 NOTE — PROGRESS NOTES
"Subjective:       Patient ID: Rosanna Live is a 62 y.o. female.    Chief Complaint: Annual Exam  This is a 62-year-old who presents today for physical.  Patient reports that she has been feeling well overall has started weight watchers program and trying to work on getting down she feels better when she eats better overall though she has been eating more ABX.  She has had some discomfort on occasion the right breast recently.  She has history of reflux with Brito's but reports does not like the way proton pump inhibitors make her feel so she only takes omeprazole on occasion instead of daily since she feels they give her GI issues and she has tried different agents.  She does try to watch her diet.  She does have a lot of problems with sinuses and allergies and uses saline spray Flonase and antihistamine regularly    HPI  Review of Systems   HENT: Positive for congestion and postnasal drip.    Endocrine:        Right breast /gland swelilng at times        Objective:     Blood pressure 132/80, pulse 71, height 5' 6" (1.676 m), weight 94.4 kg (208 lb 1.8 oz), SpO2 97 %.    Physical Exam  Constitutional:       General: She is not in acute distress.  HENT:      Head: Normocephalic.   Eyes:      General: No scleral icterus.  Cardiovascular:      Rate and Rhythm: Normal rate and regular rhythm.      Heart sounds: Normal heart sounds. No murmur heard.    No friction rub. No gallop.      Comments: Breast : normal no masses or tenderness   Pulmonary:      Effort: Pulmonary effort is normal. No respiratory distress.      Breath sounds: Normal breath sounds.   Abdominal:      General: Bowel sounds are normal.      Palpations: Abdomen is soft. There is no mass.      Tenderness: There is no abdominal tenderness.   Musculoskeletal:      Cervical back: Neck supple.      Comments: Varicose veins right   Stable edema    Skin:     Findings: No erythema.   Neurological:      Mental Status: She is alert.       "   Assessment:       1. Annual physical exam    2. Gastroesophageal reflux disease, unspecified whether esophagitis present    3. Brito's esophagus without dysplasia    4. Breast pain    5. Palpitations        Plan:       Rosanna Marshall was seen today for annual exam.    Diagnoses and all orders for this visit:    Annual physical exam    Gastroesophageal reflux disease, unspecified whether esophagitis present  -    Brito's esophagus without dysplasia  History of we discussed proton pump inhibitor benefits with her history of esophagitis and Brito's along with dietary measures and GI follow-up she will be due in September referral placed for her to schedule  -     Ambulatory referral/consult to Gastroenterology; Future    Allergic rhintis conservative meareues  Continue saline, flonse     Breast pain  Discussed will proceed with  -     Mammo Digital Diagnostic Right; Future    Hx palpiations she has been asymptomatic remains on metoprlol will continue     Varicose veins discusse supportive stockings and she may consider vascular follow up     Discussed immunizations and she will follow gyn when due     Discussed exercise and home bp monitoring

## 2022-04-11 NOTE — TELEPHONE ENCOUNTER
----- Message from Kalee Ellington MA sent at 4/11/2022  4:51 PM CDT -----  Contact: pt    ----- Message -----  From: Ric Castro  Sent: 4/11/2022   4:46 PM CDT  To: Sunil SNOW Staff    Who Called: PT  Regarding: The pt is returning the nurse call and is requesting a callback for scheduling.   Would the patient rather a call back or a response via [facility]? Call back  Best Call Back Number: [phone]  Additional Information:

## 2022-04-11 NOTE — TELEPHONE ENCOUNTER
----- Message from Kalee Ellington MA sent at 4/11/2022  4:05 PM CDT -----  Regarding: FW: Appt Request    ----- Message -----  From: Tori Hearn  Sent: 4/11/2022   2:44 PM CDT  To: Sunil SONW Staff  Subject: Appt Request                                     Pt called about scheduling her f/u endoscopy and colonoscopy with doctor per her pcp.     Requesting call back: [phone]      
Return call to patient. No answer left message for patient to call the office back.   
Pf[employer] dose 1 and 2

## 2022-04-13 ENCOUNTER — TELEPHONE (OUTPATIENT)
Dept: GASTROENTEROLOGY | Facility: CLINIC | Age: 62
End: 2022-04-13
Payer: COMMERCIAL

## 2022-04-13 NOTE — TELEPHONE ENCOUNTER
Return call to patient and spoke with patient. Inform patient that Dr. Barber has placed orders for procedure and gave number to endoscopy schedulers.     Patient verbalized understanding.

## 2022-04-13 NOTE — TELEPHONE ENCOUNTER
----- Message from Carlos Manuel Barber MD sent at 4/11/2022  7:42 PM CDT -----  Regarding: RE: Appt Request  Orders placed    Please schedule patient follow-up GI clinic appointment next available for Barretts esophagus  ----- Message -----  From: Roman Clement MA  Sent: 4/11/2022   4:35 PM CDT  To: Carlos Manuel Barber MD  Subject: FW: Appt Request                                 Dr. Barber please advise. Patient request for follow up EGD/Colonoscopy. May you place orders.     Thanks,     ----- Message -----  From: Kalee Ellington MA  Sent: 4/11/2022   4:05 PM CDT  To: Roman Clement MA  Subject: FW: Appt Request                                   ----- Message -----  From: Tori Hearn  Sent: 4/11/2022   2:44 PM CDT  To: Sunil SNOW Staff  Subject: Appt Request                                     Pt called about scheduling her f/u endoscopy and colonoscopy with doctor per her pcp.     Requesting call back: [phone]

## 2022-06-08 ENCOUNTER — PATIENT MESSAGE (OUTPATIENT)
Dept: ENDOSCOPY | Facility: HOSPITAL | Age: 62
End: 2022-06-08
Payer: COMMERCIAL

## 2022-06-08 DIAGNOSIS — Z01.818 PRE-OP TESTING: ICD-10-CM

## 2022-06-08 DIAGNOSIS — Z12.11 COLON CANCER SCREENING: Primary | ICD-10-CM

## 2022-06-08 RX ORDER — POLYETHYLENE GLYCOL 3350, SODIUM SULFATE ANHYDROUS, SODIUM BICARBONATE, SODIUM CHLORIDE, POTASSIUM CHLORIDE 236; 22.74; 6.74; 5.86; 2.97 G/4L; G/4L; G/4L; G/4L; G/4L
4 POWDER, FOR SOLUTION ORAL ONCE
Qty: 4000 ML | Refills: 0 | Status: SHIPPED | OUTPATIENT
Start: 2022-06-08 | End: 2022-06-08

## 2022-07-08 ENCOUNTER — LAB VISIT (OUTPATIENT)
Dept: SURGERY | Facility: CLINIC | Age: 62
End: 2022-07-08
Payer: COMMERCIAL

## 2022-07-08 DIAGNOSIS — Z01.818 PRE-OP TESTING: ICD-10-CM

## 2022-07-08 LAB — SARS-COV-2 RNA RESP QL NAA+PROBE: NOT DETECTED

## 2022-07-08 PROCEDURE — U0005 INFEC AGEN DETEC AMPLI PROBE: HCPCS | Performed by: FAMILY MEDICINE

## 2022-07-08 PROCEDURE — U0003 INFECTIOUS AGENT DETECTION BY NUCLEIC ACID (DNA OR RNA); SEVERE ACUTE RESPIRATORY SYNDROME CORONAVIRUS 2 (SARS-COV-2) (CORONAVIRUS DISEASE [COVID-19]), AMPLIFIED PROBE TECHNIQUE, MAKING USE OF HIGH THROUGHPUT TECHNOLOGIES AS DESCRIBED BY CMS-2020-01-R: HCPCS | Performed by: FAMILY MEDICINE

## 2022-07-11 ENCOUNTER — ANESTHESIA (OUTPATIENT)
Dept: ENDOSCOPY | Facility: HOSPITAL | Age: 62
End: 2022-07-11
Payer: COMMERCIAL

## 2022-07-11 ENCOUNTER — ANESTHESIA EVENT (OUTPATIENT)
Dept: ENDOSCOPY | Facility: HOSPITAL | Age: 62
End: 2022-07-11

## 2022-07-11 ENCOUNTER — HOSPITAL ENCOUNTER (OUTPATIENT)
Facility: HOSPITAL | Age: 62
Discharge: HOME | End: 2022-07-11
Attending: INTERNAL MEDICINE | Admitting: INTERNAL MEDICINE
Payer: COMMERCIAL

## 2022-07-11 VITALS
SYSTOLIC BLOOD PRESSURE: 129 MMHG | HEIGHT: 66 IN | OXYGEN SATURATION: 99 % | TEMPERATURE: 97 F | BODY MASS INDEX: 31.66 KG/M2 | HEART RATE: 68 BPM | DIASTOLIC BLOOD PRESSURE: 77 MMHG | WEIGHT: 197 LBS | RESPIRATION RATE: 18 BRPM

## 2022-07-11 DIAGNOSIS — K22.70 BARRETT'S ESOPHAGUS: ICD-10-CM

## 2022-07-11 PROCEDURE — 88305 TISSUE EXAM BY PATHOLOGIST: ICD-10-PCS | Mod: 26,,, | Performed by: PATHOLOGY

## 2022-07-11 PROCEDURE — 45380 PR COLONOSCOPY,BIOPSY: ICD-10-PCS | Mod: PT,,, | Performed by: INTERNAL MEDICINE

## 2022-07-11 PROCEDURE — 43239 EGD BIOPSY SINGLE/MULTIPLE: CPT | Mod: 51,,, | Performed by: INTERNAL MEDICINE

## 2022-07-11 PROCEDURE — E9220 PRA ENDO ANESTHESIA: HCPCS | Mod: ,,, | Performed by: NURSE ANESTHETIST, CERTIFIED REGISTERED

## 2022-07-11 PROCEDURE — 63600175 PHARM REV CODE 636 W HCPCS: Performed by: NURSE ANESTHETIST, CERTIFIED REGISTERED

## 2022-07-11 PROCEDURE — 37000008 HC ANESTHESIA 1ST 15 MINUTES: Performed by: INTERNAL MEDICINE

## 2022-07-11 PROCEDURE — 88305 TISSUE EXAM BY PATHOLOGIST: CPT | Mod: 59 | Performed by: PATHOLOGY

## 2022-07-11 PROCEDURE — 37000009 HC ANESTHESIA EA ADD 15 MINS: Performed by: INTERNAL MEDICINE

## 2022-07-11 PROCEDURE — 88305 TISSUE EXAM BY PATHOLOGIST: CPT | Mod: 26,,, | Performed by: PATHOLOGY

## 2022-07-11 PROCEDURE — 43239 EGD BIOPSY SINGLE/MULTIPLE: CPT | Performed by: INTERNAL MEDICINE

## 2022-07-11 PROCEDURE — 27201012 HC FORCEPS, HOT/COLD, DISP: Performed by: INTERNAL MEDICINE

## 2022-07-11 PROCEDURE — 25000003 PHARM REV CODE 250: Performed by: NURSE ANESTHETIST, CERTIFIED REGISTERED

## 2022-07-11 PROCEDURE — 45380 COLONOSCOPY AND BIOPSY: CPT | Mod: PT | Performed by: INTERNAL MEDICINE

## 2022-07-11 PROCEDURE — 45380 COLONOSCOPY AND BIOPSY: CPT | Mod: PT,,, | Performed by: INTERNAL MEDICINE

## 2022-07-11 PROCEDURE — 43239 PR EGD, FLEX, W/BIOPSY, SGL/MULTI: ICD-10-PCS | Mod: 51,,, | Performed by: INTERNAL MEDICINE

## 2022-07-11 PROCEDURE — E9220 PRA ENDO ANESTHESIA: ICD-10-PCS | Mod: ,,, | Performed by: NURSE ANESTHETIST, CERTIFIED REGISTERED

## 2022-07-11 PROCEDURE — 25000003 PHARM REV CODE 250: Performed by: INTERNAL MEDICINE

## 2022-07-11 RX ORDER — PHENYLEPHRINE HYDROCHLORIDE 10 MG/ML
INJECTION INTRAVENOUS
Status: DISCONTINUED | OUTPATIENT
Start: 2022-07-11 | End: 2022-07-11

## 2022-07-11 RX ORDER — LIDOCAINE HCL/PF 100 MG/5ML
SYRINGE (ML) INTRAVENOUS
Status: DISCONTINUED | OUTPATIENT
Start: 2022-07-11 | End: 2022-07-11

## 2022-07-11 RX ORDER — PROPOFOL 10 MG/ML
INJECTION, EMULSION INTRAVENOUS CONTINUOUS PRN
Status: DISCONTINUED | OUTPATIENT
Start: 2022-07-11 | End: 2022-07-11

## 2022-07-11 RX ORDER — SODIUM CHLORIDE 9 MG/ML
INJECTION, SOLUTION INTRAVENOUS CONTINUOUS
Status: DISCONTINUED | OUTPATIENT
Start: 2022-07-11 | End: 2022-07-11 | Stop reason: HOSPADM

## 2022-07-11 RX ORDER — PROPOFOL 10 MG/ML
INJECTION, EMULSION INTRAVENOUS
Status: DISCONTINUED | OUTPATIENT
Start: 2022-07-11 | End: 2022-07-11

## 2022-07-11 RX ADMIN — PHENYLEPHRINE HYDROCHLORIDE 100 MCG: 10 INJECTION INTRAVENOUS at 12:07

## 2022-07-11 RX ADMIN — PROPOFOL 50 MG: 10 INJECTION, EMULSION INTRAVENOUS at 12:07

## 2022-07-11 RX ADMIN — Medication 100 MG: at 12:07

## 2022-07-11 RX ADMIN — GLYCOPYRROLATE 0.2 MG: 0.2 INJECTION, SOLUTION INTRAMUSCULAR; INTRAVITREAL at 12:07

## 2022-07-11 RX ADMIN — PROPOFOL 100 MG: 10 INJECTION, EMULSION INTRAVENOUS at 12:07

## 2022-07-11 RX ADMIN — SODIUM CHLORIDE: 0.9 INJECTION, SOLUTION INTRAVENOUS at 12:07

## 2022-07-11 RX ADMIN — PROPOFOL 30 MG: 10 INJECTION, EMULSION INTRAVENOUS at 12:07

## 2022-07-11 RX ADMIN — PROPOFOL 150 MCG/KG/MIN: 10 INJECTION, EMULSION INTRAVENOUS at 12:07

## 2022-07-11 NOTE — PROVATION PATIENT INSTRUCTIONS
Discharge Summary/Instructions after an Endoscopic Procedure  Patient Name: Rosanna Live  Patient MRN: [id number]  Patient   YOB: 1960 Monday, July 11, 2022  Carlos Manuel Barber MD  Dear patient,  As a result of recent federal legislation (The Federal Cures Act), you may   receive lab or pathology results from your procedure in your [facility]   account before your physician is able to contact you. Your physician or   their representative will relay the results to you with their   recommendations at their soonest availability.  Thank you,  RESTRICTIONS:  During your procedure today, you received medications for sedation.  These   medications may affect your judgment, balance and coordination.  Therefore,   for 24 hours, you have the following restrictions:   - DO NOT drive a car, operate machinery, make legal/financial decisions,   sign important papers or drink alcohol.    ACTIVITY:  Today: no heavy lifting, straining or running due to procedural   sedation/anesthesia.  The following day: return to full activity including work.  DIET:  Eat and drink normally unless instructed otherwise.     TREATMENT FOR COMMON SIDE EFFECTS:  - Mild abdominal pain, nausea, belching, bloating or excessive gas:  rest,   eat lightly and use a heating pad.  - Sore Throat: treat with throat lozenges and/or gargle with warm salt   water.  - Because air was used during the procedure, expelling large amounts of air   from your rectum or belching is normal.  - If a bowel prep was taken, you may not have a bowel movement for 1-3 days.    This is normal.  SYMPTOMS TO WATCH FOR AND REPORT TO YOUR PHYSICIAN:  1. Abdominal pain or bloating, other than gas cramps.  2. Chest pain.  3. Back pain.  4. Signs of infection such as: chills or fever occurring within 24 hours   after the procedure.  5. Rectal bleeding, which would show as bright red, maroon, or black stools.   (A tablespoon of blood from the rectum is not serious,  especially if   hemorrhoids are present.)  6. Vomiting.  7. Weakness or dizziness.  GO DIRECTLY TO THE NEAREST EMERGENCY ROOM IF YOU HAVE ANY OF THE FOLLOWING:      Difficulty breathing              Chills and/or fever over 101 F   Persistent vomiting and/or vomiting blood   Severe abdominal pain   Severe chest pain   Black, tarry stools   Bleeding- more than one tablespoon   Any other symptom or condition that you feel may need urgent attention  Your doctor recommends these additional instructions:  If any biopsies were taken, your doctors clinic will contact you in 1 to 2   weeks with any results.  - Discharge patient to home.   - Follow an antireflux regimen indefinitely.   - Await pathology results.   - Telephone endoscopist for pathology results in 2 weeks.   - Use a proton pump inhibitor by mouth once daily indefinitely. Best taken   45-60 minutes before your first protein meal of the day - Breakfast.  - Repeat upper endoscopy in 3 years for surveillance based on pathology   results.   - Return to GI clinic.   - The findings and recommendations were discussed with the patient.  For questions, problems or results please call your physician - Carlos Manuel Barber MD at Work:  [phone].  [facility], EMERGENCY ROOM PHONE NUMBER: [phone]  IF A COMPLICATION OR EMERGENCY SITUATION ARISES AND YOU ARE UNABLE TO REACH   YOUR PHYSICIAN - GO DIRECTLY TO THE EMERGENCY ROOM.  Carlos Manuel Barber MD  7/11/2022 1:03:49 PM  This report has been verified and signed electronically.  Dear patient,  As a result of recent federal legislation (The Federal Cures Act), you may   receive lab or pathology results from your procedure in your [facility]   account before your physician is able to contact you. Your physician or   their representative will relay the results to you with their   recommendations at their soonest availability.  Thank you,  PROVATION

## 2022-07-11 NOTE — PROVATION PATIENT INSTRUCTIONS
Discharge Summary/Instructions after an Endoscopic Procedure  Patient Name: Rosanna Live  Patient MRN: [id number]  Patient   YOB: 1960 Monday, July 11, 2022  Carlos Manuel Barber MD  Dear patient,  As a result of recent federal legislation (The Federal Cures Act), you may   receive lab or pathology results from your procedure in your [facility]   account before your physician is able to contact you. Your physician or   their representative will relay the results to you with their   recommendations at their soonest availability.  Thank you,  RESTRICTIONS:  During your procedure today, you received medications for sedation.  These   medications may affect your judgment, balance and coordination.  Therefore,   for 24 hours, you have the following restrictions:   - DO NOT drive a car, operate machinery, make legal/financial decisions,   sign important papers or drink alcohol.    ACTIVITY:  Today: no heavy lifting, straining or running due to procedural   sedation/anesthesia.  The following day: return to full activity including work.  DIET:  Eat and drink normally unless instructed otherwise.     TREATMENT FOR COMMON SIDE EFFECTS:  - Mild abdominal pain, nausea, belching, bloating or excessive gas:  rest,   eat lightly and use a heating pad.  - Sore Throat: treat with throat lozenges and/or gargle with warm salt   water.  - Because air was used during the procedure, expelling large amounts of air   from your rectum or belching is normal.  - If a bowel prep was taken, you may not have a bowel movement for 1-3 days.    This is normal.  SYMPTOMS TO WATCH FOR AND REPORT TO YOUR PHYSICIAN:  1. Abdominal pain or bloating, other than gas cramps.  2. Chest pain.  3. Back pain.  4. Signs of infection such as: chills or fever occurring within 24 hours   after the procedure.  5. Rectal bleeding, which would show as bright red, maroon, or black stools.   (A tablespoon of blood from the rectum is not serious,  especially if   hemorrhoids are present.)  6. Vomiting.  7. Weakness or dizziness.  GO DIRECTLY TO THE NEAREST EMERGENCY ROOM IF YOU HAVE ANY OF THE FOLLOWING:      Difficulty breathing              Chills and/or fever over 101 F   Persistent vomiting and/or vomiting blood   Severe abdominal pain   Severe chest pain   Black, tarry stools   Bleeding- more than one tablespoon   Any other symptom or condition that you feel may need urgent attention  Your doctor recommends these additional instructions:  If any biopsies were taken, your doctors clinic will contact you in 1 to 2   weeks with any results.  - Discharge patient to home.   - Await pathology results.   - Telephone endoscopist for pathology results in 3 weeks.   - Repeat colonoscopy in 6 months to check healing.   - Return to GI clinic at the next available appointment.   - The findings and recommendations were discussed with the patient.  For questions, problems or results please call your physician - Carlos Manuel Barber MD at Work:  [phone].  [facility], EMERGENCY ROOM PHONE NUMBER: [phone]  IF A COMPLICATION OR EMERGENCY SITUATION ARISES AND YOU ARE UNABLE TO REACH   YOUR PHYSICIAN - GO DIRECTLY TO THE EMERGENCY ROOM.  Carlos Manuel Barber MD  7/11/2022 1:02:42 PM  This report has been verified and signed electronically.  Dear patient,  As a result of recent federal legislation (The Federal Cures Act), you may   receive lab or pathology results from your procedure in your [facility]   account before your physician is able to contact you. Your physician or   their representative will relay the results to you with their   recommendations at their soonest availability.  Thank you,  PROVATION

## 2022-07-11 NOTE — H&P
[facility] 4[facility]  History & Physical    Subjective:      Chief Complaint/Reason for Admission:     EGD and colonoscopy    Rosanna Live is a 62 y.o. female.    Past Medical History:   Diagnosis Date    Allergy     Brito's esophagus     Basal cell cancer     Cataract     Diverticulosis     Edema     chronic right foot     Hiatal hernia 1/31/2017    IBS (irritable bowel syndrome)     Obesity     Rectovaginal fistula 6/28/2012    Vitamin D deficiency disease      Past Surgical History:   Procedure Laterality Date    CHOLECYSTECTOMY      COLONOSCOPY      ESOPHAGOGASTRODUODENOSCOPY      ESOPHAGOGASTRODUODENOSCOPY N/A 9/18/2019    Procedure: EGD (ESOPHAGOGASTRODUODENOSCOPY);  Surgeon: Carlos Manuel Barber MD;  Location: [address]);  Service: Endoscopy;  Laterality: N/A;  evaluate fundoplication    INGUINAL HERNIA REPAIR      LAPAROSCOPIC NISSEN FUNDOPLICATION  2/15/16    Mohs' procedure of face      RECTO VAGINAL FISTULA  2011, 2012    UMBILICAL HERNIA REPAIR       Family History   Problem Relation Age of Onset    Arthritis Father         Rhematoid    Hyperlipidemia Father     Cataracts Father     Glaucoma Father     Heart disease Father     Miscarriages / Stillbirths Mother     Diabetes Mother     Hypertension Mother     Glaucoma Mother     Cataracts Mother     Heart disease Maternal Aunt     Heart disease Maternal Grandmother     No Known Problems Sister     No Known Problems Brother     No Known Problems Maternal Uncle     No Known Problems Paternal Aunt     No Known Problems Paternal Uncle     No Known Problems Maternal Grandfather     No Known Problems Paternal Grandmother     No Known Problems Paternal Grandfather     Breast cancer Neg Hx     Colon cancer Neg Hx     Ovarian cancer Neg Hx      Social History     Tobacco Use    Smoking status: Former Smoker     Packs/day: 0.25     Years: 5.00     Pack years: 1.25     Quit date: 8/31/1983      Years since quittin.8    Smokeless tobacco: Never Used   Substance Use Topics    Alcohol use: Yes     Alcohol/week: 1.0 standard drink     Types: 1 Glasses of wine per week     Comment: Social    Drug use: No       PTA Medications   Medication Sig    metoprolol tartrate (LOPRESSOR) 25 MG tablet TAKE ONE HALF (1/2) TABLET BY MOUTH TWICE A DAY    omeprazole (PRILOSEC) 20 MG capsule Take 20 mg by mouth once daily.     Review of patient's allergies indicates:   Allergen Reactions    Penicillins Rash        Review of Systems   Constitutional: Negative for fever.   Respiratory: Negative for shortness of breath.    Cardiovascular: Negative for chest pain.       Objective:      Vital Signs (Most Recent)  Temp: 97.9 °F (36.6 °C) (22 1110)  Pulse: 88 (22 1110)  Resp: 20 (22 1110)  BP: 128/82 (22 1110)  SpO2: 96 % (22 1110)    Vital Signs Range (Last 24H):  Temp:  [97.9 °F (36.6 °C)]   Pulse:  [88]   Resp:  [20]   BP: (128)/(82)   SpO2:  [96 %]     Physical Exam  Constitutional:       Appearance: Normal appearance.   Cardiovascular:      Rate and Rhythm: Normal rate.   Pulmonary:      Effort: Pulmonary effort is normal.   Neurological:      Mental Status: She is alert and oriented to person, place, and time.       Assessment:      There are no hospital problems to display for this patient.      Plan:    EGD and colonoscopy for Brito's surveillance and screening colonoscopy

## 2022-07-11 NOTE — TRANSFER OF CARE
"Anesthesia Transfer of Care Note    Patient: Rosanna Live    Procedure(s) Performed: Procedure(s) (LRB):  EGD (ESOPHAGOGASTRODUODENOSCOPY) (N/A)  COLONOSCOPY (N/A)    Patient location: GI    Anesthesia Type: general    Transport from OR: Transported from OR on room air with adequate spontaneous ventilation    Post pain: adequate analgesia    Post assessment: no apparent anesthetic complications    Post vital signs: stable    Level of consciousness: awake    Nausea/Vomiting: no nausea/vomiting    Complications: none    Transfer of care protocol was followed      Last vitals:   Visit Vitals  /59   Pulse 78   Temp 36   Resp 18   Ht 5' 6" (1.676 m)   Wt 89.4 kg (197 lb)   LMP  (LMP Unknown)   SpO2 99%   Breastfeeding No   BMI 31.80 kg/m²     "

## 2022-07-11 NOTE — ANESTHESIA PREPROCEDURE EVALUATION
07/11/2022  Rosanna Live is a 62 y.o., female.    Patient Active Problem List   Diagnosis    Rectovaginal fistula    Brito's esophagus without dysplasia    Vitamin D imbalance    Encounter for long-term (current) use of other medications    History of Nissen fundoplication    Low hemoglobin and low hematocrit    Atypical chest pain    Brito's esophagus    Epigastric pain    Bloating symptom    Food intolerance in adult    Hiatal hernia    Venous insufficiency    Atrial tachycardia    Palpitations     Past Surgical History:   Procedure Laterality Date    CHOLECYSTECTOMY      COLONOSCOPY      ESOPHAGOGASTRODUODENOSCOPY      ESOPHAGOGASTRODUODENOSCOPY N/A 9/18/2019    Procedure: EGD (ESOPHAGOGASTRODUODENOSCOPY);  Surgeon: Carlos Manuel Barber MD;  Location: [address]);  Service: Endoscopy;  Laterality: N/A;  evaluate fundoplication    INGUINAL HERNIA REPAIR      LAPAROSCOPIC NISSEN FUNDOPLICATION  2/15/16    Mohs' procedure of face      RECTO VAGINAL FISTULA  2011, 2012    UMBILICAL HERNIA REPAIR       Past Medical History:   Diagnosis Date    Allergy     Brito's esophagus     Basal cell cancer     Cataract     Diverticulosis     Edema     chronic right foot     Hiatal hernia 1/31/2017    IBS (irritable bowel syndrome)     Obesity     Rectovaginal fistula 6/28/2012    Vitamin D deficiency disease          Pre-op Assessment    I have reviewed the Patient Summary Reports.   I have reviewed the NPO Status.   I have reviewed the Medications.     Review of Systems  Social:  Former Smoker, Social Alcohol Use    Cardiovascular:   Palpitations   Hepatic/GI:   Hiatal Hernia, GERD, well controlled Elio's esophagus Bowel Conditions:  Irritable Bowel Syndrome        Physical Exam  General:  Well nourished      Airway/Jaw/Neck:  Airway Findings: Mouth Opening:  Normal   Tongue: Normal   General Airway Assessment: Adult Mallampati: II  Improves to I with phonation.  TM Distance: Normal, at least 6 cm   Jaw/Neck Findings:  Neck ROM: Normal ROM   Neck Findings:     Eyes/Ears/Nose:  Eyes/Ears/Nose Findings:    Dental:  Dental Findings: In tact     Chest/Lungs:  Chest/Lungs Findings: Clear to auscultation, Normal Respiratory Rate      Heart/Vascular:  Heart Findings: Rate: Normal  Rhythm: Regular Rhythm  Vascular Findings:     Abdomen:  Abdomen Findings:  Normal        Mental Status:  Mental Status Findings:  Cooperative, Alert and Oriented         Anesthesia Plan  Type of Anesthesia, risks & benefits discussed:  Anesthesia Type:  general    Patient's Preference:   Plan Factors:          Intra-op Monitoring Plan: standard ASA monitors  Intra-op Monitoring Plan Comments:   Post Op Pain Control Plan:   Post Op Pain Control Plan Comments:     Induction:   IV  Beta Blocker:         Informed Consent: Informed consent signed with the Patient and all parties understand the risks and agree with anesthesia plan.  All questions answered.  Anesthesia consent signed with patient.  ASA Score: 2     Day of Surgery Review of History & Physical: I have interviewed and examined the patient. I have reviewed the patient's H&P dated:              Ready For Surgery From Anesthesia Perspective.           Physical Exam  General: Well nourished    Airway:  Mallampati: II / I  Mouth Opening: Normal  TM Distance: Normal, at least 6 cm  Tongue: Normal  Neck ROM: Normal ROM    Dental:  In tact    Chest/Lungs:  Clear to auscultation, Normal Respiratory Rate    Heart:  Rate: Normal  Rhythm: Regular Rhythm    Abdomen:  Normal          Anesthesia Plan  Type of Anesthesia, risks & benefits discussed:    Anesthesia Type: general  Intra-op Monitoring Plan: standard ASA monitors  Induction:  IV  Informed Consent: Informed consent signed with the Patient and all parties understand the risks and agree with  anesthesia plan.  All questions answered.   ASA Score: 2  Day of Surgery Review of History & Physical: I have interviewed and examined the patient. I have reviewed the patient's H&P dated:     Ready For Surgery From Anesthesia Perspective.       .

## 2022-07-13 NOTE — ANESTHESIA POSTPROCEDURE EVALUATION
Anesthesia Post Evaluation    Patient: Rosanna Live    Procedure(s) Performed: Procedure(s) (LRB):  EGD (ESOPHAGOGASTRODUODENOSCOPY) (N/A)  COLONOSCOPY (N/A)    Final Anesthesia Type: general      Patient location during evaluation: GI PACU  Patient participation: Yes- Able to Participate  Level of consciousness: awake and alert  Post-procedure vital signs: reviewed and stable  Pain management: adequate  Airway patency: patent    PONV status at discharge: No PONV  Anesthetic complications: no      Cardiovascular status: stable  Respiratory status: spontaneous ventilation and face mask  Hydration status: euvolemic  Follow-up not needed.          Vitals Value Taken Time   /77 07/11/22 1327   Temp 36.3 °C (97.3 °F) 07/11/22 1300   Pulse 68 07/11/22 1327   Resp 18 07/11/22 1327   SpO2 99 % 07/11/22 1327         Event Time   Out of Recovery 13:35:23         Pain/Shadia Score: No data recorded

## 2022-07-18 LAB
COMMENT: NORMAL
FINAL PATHOLOGIC DIAGNOSIS: NORMAL
GROSS: NORMAL
Lab: NORMAL

## 2022-07-18 NOTE — PROGRESS NOTES
Rosanna recommend follow-up EGD in 6 months to check for mucosal healing your colon ulcer biopsies were non diagnostic although they could be consistent with a resolving ischemic colitis.  Would like to repeat follow-up colonoscopy in 6 months to confirm in to exclude inflammatory bowel disease.    EGD biopsies benign no evidence of H pylori no evidence of Barretts esophagus    1. Stomach, biopsy:   Gastric antral and oxyntic mucosa with minimal chronic inactive gastritis.   Negative for Helicobacter pylori.   2. Esophagus, distal, biopsy:   Esophageal squamous mucosa with reactive changes and rare intraepithelial   eosinophils, consistent with reflux esophagitis.   Gastric cardia-type mucosa, chronically inflamed with reactive changes.   Negative for intestinal metaplasia and dysplasia.   3. Descending colon, ulcer, biopsy:   Colonic mucosa with crypt architectural distortion, focally active colitis,   and focal crypt degeneration.   See comment.    Comment: Interp By Robi Cabral M.D., Signed on 07/18/2022 at 12:29  Comment The endoscopic impression of mucosal ulceration suspicious for mild ischemic   colitis is reviewed. The biopsy of the descending colon shows the above   changes. The crypt architectural distortion suggests a component of chronic   mucosal injury. No granulomas or dysplasia are identified. While the above   changes may be compatible with ischemic colitis, the histologic differential   also includes medication effect, infection, and inflammatory bowel disease.   Clinical and endoscopic correlation is suggested.

## 2022-08-18 ENCOUNTER — TELEPHONE (OUTPATIENT)
Dept: OBSTETRICS AND GYNECOLOGY | Facility: CLINIC | Age: 62
End: 2022-08-18
Payer: COMMERCIAL

## 2022-09-07 ENCOUNTER — OFFICE VISIT (OUTPATIENT)
Dept: OBSTETRICS AND GYNECOLOGY | Facility: CLINIC | Age: 62
End: 2022-09-07
Payer: COMMERCIAL

## 2022-09-07 VITALS
SYSTOLIC BLOOD PRESSURE: 120 MMHG | DIASTOLIC BLOOD PRESSURE: 74 MMHG | WEIGHT: 201.25 LBS | BODY MASS INDEX: 32.49 KG/M2

## 2022-09-07 DIAGNOSIS — Z11.51 SCREENING FOR HUMAN PAPILLOMAVIRUS (HPV): ICD-10-CM

## 2022-09-07 DIAGNOSIS — Z01.419 WOMEN'S ANNUAL ROUTINE GYNECOLOGICAL EXAMINATION: Primary | ICD-10-CM

## 2022-09-07 DIAGNOSIS — Z78.0 POSTMENOPAUSAL: ICD-10-CM

## 2022-09-07 DIAGNOSIS — Z12.4 ENCOUNTER FOR PAPANICOLAOU SMEAR FOR CERVICAL CANCER SCREENING: ICD-10-CM

## 2022-09-07 PROCEDURE — 3078F DIAST BP <80 MM HG: CPT | Mod: CPTII,S$GLB,, | Performed by: PHYSICIAN ASSISTANT

## 2022-09-07 PROCEDURE — 1159F MED LIST DOCD IN RCRD: CPT | Mod: CPTII,S$GLB,, | Performed by: PHYSICIAN ASSISTANT

## 2022-09-07 PROCEDURE — 3044F PR MOST RECENT HEMOGLOBIN A1C LEVEL <7.0%: ICD-10-PCS | Mod: CPTII,S$GLB,, | Performed by: PHYSICIAN ASSISTANT

## 2022-09-07 PROCEDURE — 3074F SYST BP LT 130 MM HG: CPT | Mod: CPTII,S$GLB,, | Performed by: PHYSICIAN ASSISTANT

## 2022-09-07 PROCEDURE — 3078F PR MOST RECENT DIASTOLIC BLOOD PRESSURE < 80 MM HG: ICD-10-PCS | Mod: CPTII,S$GLB,, | Performed by: PHYSICIAN ASSISTANT

## 2022-09-07 PROCEDURE — 99396 PREV VISIT EST AGE 40-64: CPT | Mod: S$GLB,,, | Performed by: PHYSICIAN ASSISTANT

## 2022-09-07 PROCEDURE — 87624 HPV HI-RISK TYP POOLED RSLT: CPT | Performed by: PHYSICIAN ASSISTANT

## 2022-09-07 PROCEDURE — 99396 PR PREVENTIVE VISIT,EST,40-64: ICD-10-PCS | Mod: S$GLB,,, | Performed by: PHYSICIAN ASSISTANT

## 2022-09-07 PROCEDURE — 1160F RVW MEDS BY RX/DR IN RCRD: CPT | Mod: CPTII,S$GLB,, | Performed by: PHYSICIAN ASSISTANT

## 2022-09-07 PROCEDURE — 3008F BODY MASS INDEX DOCD: CPT | Mod: CPTII,S$GLB,, | Performed by: PHYSICIAN ASSISTANT

## 2022-09-07 PROCEDURE — 88175 CYTOPATH C/V AUTO FLUID REDO: CPT | Performed by: PHYSICIAN ASSISTANT

## 2022-09-07 PROCEDURE — 1160F PR REVIEW ALL MEDS BY PRESCRIBER/CLIN PHARMACIST DOCUMENTED: ICD-10-PCS | Mod: CPTII,S$GLB,, | Performed by: PHYSICIAN ASSISTANT

## 2022-09-07 PROCEDURE — 3074F PR MOST RECENT SYSTOLIC BLOOD PRESSURE < 130 MM HG: ICD-10-PCS | Mod: CPTII,S$GLB,, | Performed by: PHYSICIAN ASSISTANT

## 2022-09-07 PROCEDURE — 1159F PR MEDICATION LIST DOCUMENTED IN MEDICAL RECORD: ICD-10-PCS | Mod: CPTII,S$GLB,, | Performed by: PHYSICIAN ASSISTANT

## 2022-09-07 PROCEDURE — 99999 PR PBB SHADOW E&M-EST. PATIENT-LVL III: CPT | Mod: PBBFAC,,, | Performed by: PHYSICIAN ASSISTANT

## 2022-09-07 PROCEDURE — 99999 PR PBB SHADOW E&M-EST. PATIENT-LVL III: ICD-10-PCS | Mod: PBBFAC,,, | Performed by: PHYSICIAN ASSISTANT

## 2022-09-07 PROCEDURE — 3008F PR BODY MASS INDEX (BMI) DOCUMENTED: ICD-10-PCS | Mod: CPTII,S$GLB,, | Performed by: PHYSICIAN ASSISTANT

## 2022-09-07 PROCEDURE — 3044F HG A1C LEVEL LT 7.0%: CPT | Mod: CPTII,S$GLB,, | Performed by: PHYSICIAN ASSISTANT

## 2022-09-07 NOTE — PROGRESS NOTES
HISTORY OF PRESENT ILLNESS:    Rosanna Live is a 62 y.o. female , presents for a routine exam.  No GYN complaints. She DENIES vaginal bleeding, vasomotor symptoms, vaginal dryness.  She does not want STD screening.    The patient participates in regular exercise: NO.  The patient does not smoke.  The patient wears seatbelts.   Pt denies any domestic violence.  NO-  tattoos or blood transfusions.     SCREENING HISTORY:  PAP:  nilm   MAMMOGRAM:    TC:  9.06  COLONOSCOPY: 2022 - return in 6 mo   COVID VACCINE: dose 2 due     Gyn FH:  Breast cancer: none  Colon cancer: none  Ovarian cancer: none  Endometrial cancer: none    Last   .  COTEST PLANNED  AND MAMMO ORDERED.  NO GYN PROBLEMS.  DISCUSSED HER AND HER DAUGHTER GETTING THE COVID VACCINE  :   MAMMO ORDER AND NL PAP .  NO GYN C/O.  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[employer], [employer], MAKING PLANS FOR FALL       Past Medical History:   Diagnosis Date    Allergy     Brito's esophagus     Basal cell cancer     Cataract     Diverticulosis     Edema     chronic right foot     Hiatal hernia 2017    IBS (irritable bowel syndrome)     Obesity     Rectovaginal fistula 2012    Vitamin D deficiency disease        Past Surgical History:   Procedure Laterality Date    CHOLECYSTECTOMY      COLONOSCOPY      COLONOSCOPY N/A 2022    Procedure: COLONOSCOPY;  Surgeon: Carlos Manuel Barber MD;  Location: [address]);  Service: Endoscopy;  Laterality: N/A;  golytely    ESOPHAGOGASTRODUODENOSCOPY      ESOPHAGOGASTRODUODENOSCOPY N/A 2019    Procedure: EGD (ESOPHAGOGASTRODUODENOSCOPY);  Surgeon: Carlos Manuel Barber MD;  Location: [address]);  Service: Endoscopy;  Laterality: N/A;  evaluate fundoplication    ESOPHAGOGASTRODUODENOSCOPY N/A 2022    Procedure: EGD (ESOPHAGOGASTRODUODENOSCOPY);  Surgeon:  Carlos Manuel Barber MD;  [facility]  Service: Endoscopy;  Laterality: N/A;  Covid test  [facility] [place] sent to [email or web address]    INGUINAL HERNIA REPAIR      LAPAROSCOPIC NISSEN FUNDOPLICATION  2/15/16    Mohs' procedure of face      RECTO VAGINAL FISTULA  ,     UMBILICAL HERNIA REPAIR          MEDICATIONS AND ALLERGIES:      Current Outpatient Medications:     metoprolol tartrate (LOPRESSOR) 25 MG tablet, TAKE ONE HALF (1/2) TABLET BY MOUTH TWICE A DAY, Disp: 90 tablet, Rfl: 3    Review of patient's allergies indicates:   Allergen Reactions    Penicillins Rash       Family History   Problem Relation Age of Onset    Arthritis Father         Rhematoid    Hyperlipidemia Father     Cataracts Father     Glaucoma Father     Heart disease Father     Miscarriages / Stillbirths Mother     Diabetes Mother     Hypertension Mother     Glaucoma Mother     Cataracts Mother     Heart disease Maternal Aunt     Heart disease Maternal Grandmother     No Known Problems Sister     No Known Problems Brother     No Known Problems Maternal Uncle     No Known Problems Paternal Aunt     No Known Problems Paternal Uncle     No Known Problems Maternal Grandfather     No Known Problems Paternal Grandmother     No Known Problems Paternal Grandfather     Breast cancer Neg Hx     Colon cancer Neg Hx     Ovarian cancer Neg Hx        Social History     Socioeconomic History    Marital status:    Occupational History    Occupation: Teacher     Employer: [employer]   Tobacco Use    Smoking status: Former     Packs/day: 0.25     Years: 5.00     Pack years: 1.25     Types: Cigarettes     Quit date: 1983     Years since quittin.0    Smokeless tobacco: Never   Substance and Sexual Activity    Alcohol use: Yes     Alcohol/week: 1.0 standard drink     Types: 1 Glasses of wine per week     Comment: Social    Drug use: No    Sexual activity: Never     Partners: Male     Birth control/protection:  None, Abstinence   Social History Narrative         Social Determinants of Health     Financial Resource Strain: Low Risk     Difficulty of Paying Living Expenses: Not hard at all   Food Insecurity: No Food Insecurity    Worried About Running Out of Food in the Last Year: Never true    Ran Out of Food in the Last Year: Never true   Transportation Needs: No Transportation Needs    Lack of Transportation (Medical): No    Lack of Transportation (Non-Medical): No   Physical Activity: Insufficiently Active    Days of Exercise per Week: 1 day    Minutes of Exercise per Session: 30 min   Stress: No Stress Concern Present    Feeling of Stress : Only a little   Social Connections: Unknown    Frequency of Communication with Friends and Family: More than three times a week    Frequency of Social Gatherings with Friends and Family: Once a week    Active Member of Clubs or Organizations: No    Attends Club or Organization Meetings: 1 to 4 times per year    Marital Status:    Housing Stability: Low Risk     Unable to Pay for Housing in the Last Year: No    Number of Places Lived in the Last Year: 1    Unstable Housing in the Last Year: No       COMPREHENSIVE GYN HISTORY:  PAP History:  Denies abnormal Paps except a noted above.  Infection History: Denies STDs. Denies PID.  Benign History: Denies uterine fibroids. Denies ovarian cysts. Denies endometriosis.  Denies other conditions.  Cancer History: Denies cervical cancer. Denies uterine cancer or hyperplasia. Denies ovarian cancer. Denies vulvar cancer or pre-cancer. Denies vaginal cancer or pre-cancer. Denies breast cancer. Denies colon cancer.    ROS:  GENERAL: No weight changes. No swelling. No fatigue. No fever.  CARDIOVASCULAR: No chest pain. No shortness of breath. No leg cramps.   NEUROLOGICAL: No headaches. No vision changes.  BREASTS: No pain. No lumps. No discharge.  ABDOMEN: No pain. No nausea. No vomiting. No diarrhea. No constipation.  REPRODUCTIVE: No  abnormal bleeding.   VULVA: No pain. No lesions. No itching.  VAGINA: No relaxation. No itching. No odor. No discharge. No lesions.  URINARY: No incontinence. No nocturia. No frequency. No dysuria.    /74   Wt 91.3 kg (201 lb 4.5 oz)   LMP  (LMP Unknown)   BMI 32.49 kg/m²     PE:  APPEARANCE: Well nourished, well developed, in no acute distress.  AFFECT: WNL, alert and oriented x 3.  SKIN: No hirsutism or acne.  NECK: Neck symmetric without masses or thyromegaly.  NODES: No inguinal, cervical, axillary or femoral lymph node enlargement.  CHEST: Good respiratory effort.   ABDOMEN: Soft. No tenderness or masses. No hepatosplenomegaly. No hernias.  BREASTS: Symmetrical, no skin changes or visible lesions. No palpable masses, nipple discharge bilaterally.  PELVIC: ATROPHIC EXTERNAL FEMALE GENITALIA without lesions. Normal hair distribution. Adequate perineal body, normal urethral meatus. VAGINA DRY without lesions or discharge. CERVIX STENOTIC without lesions, discharge or tenderness. No significant cystocele or rectocele. Bimanual exam shows uterus to be normal size, regular, mobile and nontender. Adnexa without masses or tenderness.  EXTREMITIES: No edema.    PROCEDURES:    DIAGNOSIS:  1. Women's annual routine gynecological examination        2. Postmenopausal        3. Screening for human papillomavirus (HPV)  HPV High Risk Genotypes, PCR      4. Encounter for Papanicolaou smear for cervical cancer screening  Liquid-Based Pap Smear, Screening          PLAN:  - RETURN IN 1 YEAR FOR ANNUAL OR SOONER IF PROBLEMS   - Pap and HPV done today.  - Screening tests as ordered.  - Diet and exercise encouraged.  Seat belt use encouraged.  Reviewed ASCCP Pap guidelines and screening recommendations.    Counseling: Healthy life style choices including diet and exercise, 1200 mg calcium and 600 IU until age 71 then 800 IU vitamin D supplementation daily, healthy sexual decision making, development of healthy and safe  relationships.  Patient was counseled today on postmenopausal issues.   The patient was counseled today on osteoporosis prevention, calcium supplementation, and regular weight bearing exercise. The patient was also counseled today on ACS PAP guidelines, with recommendations for yearly pelvic exams unless their uterus, cervix, and ovaries were removed for benign reasons; in that case, examinations every 3-5 years are recommended.  The patient was also counseled regarding monthly breast self-examination, routine STD screening for at-risk populations, prophylactic immunizations for transmitted infections such as  HPV, Pertussis, or Influenza as appropriate, and yearly mammograms when indicated by ACS guidelines.  She was advised to see her primary care physician for all other health maintenance.    FOLLOW-UP with me annually.

## 2022-09-21 ENCOUNTER — OFFICE VISIT (OUTPATIENT)
Dept: URGENT CARE | Facility: CLINIC | Age: 62
End: 2022-09-21
Payer: COMMERCIAL

## 2022-09-21 VITALS
BODY MASS INDEX: 32.3 KG/M2 | SYSTOLIC BLOOD PRESSURE: 103 MMHG | WEIGHT: 201 LBS | HEART RATE: 84 BPM | RESPIRATION RATE: 18 BRPM | HEIGHT: 66 IN | DIASTOLIC BLOOD PRESSURE: 72 MMHG | TEMPERATURE: 98 F | OXYGEN SATURATION: 97 %

## 2022-09-21 DIAGNOSIS — J01.90 ACUTE BACTERIAL SINUSITIS: Primary | ICD-10-CM

## 2022-09-21 DIAGNOSIS — B96.89 ACUTE BACTERIAL SINUSITIS: Primary | ICD-10-CM

## 2022-09-21 PROCEDURE — 1160F RVW MEDS BY RX/DR IN RCRD: CPT | Mod: CPTII,S$GLB,, | Performed by: PHYSICIAN ASSISTANT

## 2022-09-21 PROCEDURE — 3074F SYST BP LT 130 MM HG: CPT | Mod: CPTII,S$GLB,, | Performed by: PHYSICIAN ASSISTANT

## 2022-09-21 PROCEDURE — 1160F PR REVIEW ALL MEDS BY PRESCRIBER/CLIN PHARMACIST DOCUMENTED: ICD-10-PCS | Mod: CPTII,S$GLB,, | Performed by: PHYSICIAN ASSISTANT

## 2022-09-21 PROCEDURE — 3074F PR MOST RECENT SYSTOLIC BLOOD PRESSURE < 130 MM HG: ICD-10-PCS | Mod: CPTII,S$GLB,, | Performed by: PHYSICIAN ASSISTANT

## 2022-09-21 PROCEDURE — 1159F MED LIST DOCD IN RCRD: CPT | Mod: CPTII,S$GLB,, | Performed by: PHYSICIAN ASSISTANT

## 2022-09-21 PROCEDURE — 3044F HG A1C LEVEL LT 7.0%: CPT | Mod: CPTII,S$GLB,, | Performed by: PHYSICIAN ASSISTANT

## 2022-09-21 PROCEDURE — 3078F PR MOST RECENT DIASTOLIC BLOOD PRESSURE < 80 MM HG: ICD-10-PCS | Mod: CPTII,S$GLB,, | Performed by: PHYSICIAN ASSISTANT

## 2022-09-21 PROCEDURE — 3008F PR BODY MASS INDEX (BMI) DOCUMENTED: ICD-10-PCS | Mod: CPTII,S$GLB,, | Performed by: PHYSICIAN ASSISTANT

## 2022-09-21 PROCEDURE — 3044F PR MOST RECENT HEMOGLOBIN A1C LEVEL <7.0%: ICD-10-PCS | Mod: CPTII,S$GLB,, | Performed by: PHYSICIAN ASSISTANT

## 2022-09-21 PROCEDURE — 99213 PR OFFICE/OUTPT VISIT, EST, LEVL III, 20-29 MIN: ICD-10-PCS | Mod: S$GLB,,, | Performed by: PHYSICIAN ASSISTANT

## 2022-09-21 PROCEDURE — 1159F PR MEDICATION LIST DOCUMENTED IN MEDICAL RECORD: ICD-10-PCS | Mod: CPTII,S$GLB,, | Performed by: PHYSICIAN ASSISTANT

## 2022-09-21 PROCEDURE — 3078F DIAST BP <80 MM HG: CPT | Mod: CPTII,S$GLB,, | Performed by: PHYSICIAN ASSISTANT

## 2022-09-21 PROCEDURE — 99213 OFFICE O/P EST LOW 20 MIN: CPT | Mod: S$GLB,,, | Performed by: PHYSICIAN ASSISTANT

## 2022-09-21 PROCEDURE — 3008F BODY MASS INDEX DOCD: CPT | Mod: CPTII,S$GLB,, | Performed by: PHYSICIAN ASSISTANT

## 2022-09-21 RX ORDER — CEFDINIR 300 MG/1
300 CAPSULE ORAL 2 TIMES DAILY
Qty: 20 CAPSULE | Refills: 0 | Status: SHIPPED | OUTPATIENT
Start: 2022-09-21 | End: 2022-10-01

## 2022-09-21 RX ORDER — FLUTICASONE PROPIONATE 50 MCG
1 SPRAY, SUSPENSION (ML) NASAL DAILY
Qty: 16 G | Refills: 3 | Status: SHIPPED | OUTPATIENT
Start: 2022-09-21 | End: 2023-04-10

## 2022-09-21 RX ORDER — CROMOLYN SODIUM 5.2 MG/ML
1 SPRAY, METERED NASAL 4 TIMES DAILY
Qty: 26 ML | Refills: 1 | Status: SHIPPED | OUTPATIENT
Start: 2022-09-21 | End: 2023-04-10

## 2022-09-21 RX ORDER — PREDNISONE 10 MG/1
10 TABLET ORAL DAILY
Qty: 4 TABLET | Refills: 0 | Status: SHIPPED | OUTPATIENT
Start: 2022-09-21 | End: 2023-01-12

## 2022-09-21 NOTE — PROGRESS NOTES
"Subjective:       Patient ID: Rosanna Live is a 62 y.o. female.    Vitals:  height is 5' 6" (1.676 m) and weight is 91.2 kg (201 lb). Her temperature is 98 °F (36.7 °C). Her blood pressure is 103/72 and her pulse is 84. Her respiration is 18 and oxygen saturation is 97%.     Chief Complaint: Sinus Problem    Patient complains of sinus congestion for the headache for the last 4 weeks.  She is not blowing out green and dark brown nasal discharge. no fevers.    Sinus Problem  This is a new problem. The current episode started more than 1 month ago (1 month). The problem has been gradually worsening since onset. There has been no fever. Her pain is at a severity of 0/10. She is experiencing no pain. Associated symptoms include chills, congestion, coughing, headaches, sinus pressure and a sore throat. Pertinent negatives include no ear pain, shortness of breath, sneezing or swollen glands. Past treatments include oral decongestants (zyrtec). The treatment provided mild relief.     Constitution: Positive for chills.   HENT:  Positive for facial swelling, congestion, sinus pressure and sore throat. Negative for ear pain.    Respiratory:  Positive for cough. Negative for shortness of breath.    Allergic/Immunologic: Negative for sneezing.   Neurological:  Positive for headaches.     Objective:      Physical Exam   Constitutional: She is oriented to person, place, and time. She appears well-developed. She is cooperative.  Non-toxic appearance. She does not appear ill. No distress.   HENT:   Head: Normocephalic and atraumatic.   Ears:   Right Ear: Hearing, tympanic membrane, external ear and ear canal normal.   Left Ear: Hearing, tympanic membrane, external ear and ear canal normal.   Nose: Congestion present. No mucosal edema, rhinorrhea or nasal deformity. No epistaxis. Right sinus exhibits no maxillary sinus tenderness and no frontal sinus tenderness. Left sinus exhibits no maxillary sinus tenderness and no " frontal sinus tenderness.      Comments: Bilateral nares and turbinates hyperemic  Mouth/Throat: Uvula is midline, oropharynx is clear and moist and mucous membranes are normal. No trismus in the jaw. Normal dentition. No uvula swelling. No oropharyngeal exudate, posterior oropharyngeal edema or posterior oropharyngeal erythema.   Eyes: Conjunctivae and lids are normal. Pupils are equal, round, and reactive to light. No scleral icterus. Extraocular movement intact   Neck: Trachea normal and phonation normal. Neck supple. No edema present. No erythema present. No neck rigidity present.   Cardiovascular: Normal rate, regular rhythm, normal heart sounds and normal pulses.   Pulmonary/Chest: Effort normal and breath sounds normal. No respiratory distress. She has no decreased breath sounds. She has no rhonchi.   Abdominal: Normal appearance.   Musculoskeletal: Normal range of motion.         General: No deformity. Normal range of motion.   Neurological: She is alert and oriented to person, place, and time. She exhibits normal muscle tone. Coordination normal.   Skin: Skin is warm, dry, intact, not diaphoretic and not pale.   Psychiatric: Her speech is normal and behavior is normal. Judgment and thought content normal.   Nursing note and vitals reviewed.      Assessment:       1. Acute bacterial sinusitis          Plan:         Acute bacterial sinusitis  -     cefdinir (OMNICEF) 300 MG capsule; Take 1 capsule (300 mg total) by mouth 2 (two) times daily. for 10 days  Dispense: 20 capsule; Refill: 0  -     fluticasone propionate (FLONASE) 50 mcg/actuation nasal spray; 1 spray (50 mcg total) by Each Nostril route once daily.  Dispense: 16 g; Refill: 3  -     cromolyn (NASALCHROM) 5.2 mg/spray (4 %) nasal spray; 1 spray by Nasal route 4 (four) times daily.  Dispense: 26 mL; Refill: 1  -     predniSONE (DELTASONE) 10 MG tablet; Take 1 tablet (10 mg total) by mouth once daily.  Dispense: 4 tablet; Refill: 0       Follow up if  symptoms worsen or fail to improve, for F/U with PCP or ED. There are no Patient Instructions on file for this visit.

## 2022-12-02 ENCOUNTER — OFFICE VISIT (OUTPATIENT)
Dept: OPTOMETRY | Facility: CLINIC | Age: 62
End: 2022-12-02
Payer: COMMERCIAL

## 2022-12-02 DIAGNOSIS — H25.13 NUCLEAR SCLEROSIS, BILATERAL: Primary | ICD-10-CM

## 2022-12-02 DIAGNOSIS — H52.03 HYPEROPIA OF BOTH EYES WITH REGULAR ASTIGMATISM AND PRESBYOPIA: ICD-10-CM

## 2022-12-02 DIAGNOSIS — H52.4 HYPEROPIA OF BOTH EYES WITH REGULAR ASTIGMATISM AND PRESBYOPIA: ICD-10-CM

## 2022-12-02 DIAGNOSIS — H52.223 HYPEROPIA OF BOTH EYES WITH REGULAR ASTIGMATISM AND PRESBYOPIA: ICD-10-CM

## 2022-12-02 DIAGNOSIS — Z01.00 ENCOUNTER FOR COMPLETE EYE EXAM: ICD-10-CM

## 2022-12-02 PROCEDURE — 99999 PR PBB SHADOW E&M-EST. PATIENT-LVL III: ICD-10-PCS | Mod: PBBFAC,,, | Performed by: OPTOMETRIST

## 2022-12-02 PROCEDURE — 1159F MED LIST DOCD IN RCRD: CPT | Mod: CPTII,S$GLB,, | Performed by: OPTOMETRIST

## 2022-12-02 PROCEDURE — 92014 COMPRE OPH EXAM EST PT 1/>: CPT | Mod: S$GLB,,, | Performed by: OPTOMETRIST

## 2022-12-02 PROCEDURE — 1159F PR MEDICATION LIST DOCUMENTED IN MEDICAL RECORD: ICD-10-PCS | Mod: CPTII,S$GLB,, | Performed by: OPTOMETRIST

## 2022-12-02 PROCEDURE — 92015 DETERMINE REFRACTIVE STATE: CPT | Mod: S$GLB,,, | Performed by: OPTOMETRIST

## 2022-12-02 PROCEDURE — 3044F HG A1C LEVEL LT 7.0%: CPT | Mod: CPTII,S$GLB,, | Performed by: OPTOMETRIST

## 2022-12-02 PROCEDURE — 92014 PR EYE EXAM, EST PATIENT,COMPREHESV: ICD-10-PCS | Mod: S$GLB,,, | Performed by: OPTOMETRIST

## 2022-12-02 PROCEDURE — 3044F PR MOST RECENT HEMOGLOBIN A1C LEVEL <7.0%: ICD-10-PCS | Mod: CPTII,S$GLB,, | Performed by: OPTOMETRIST

## 2022-12-02 PROCEDURE — 99999 PR PBB SHADOW E&M-EST. PATIENT-LVL III: CPT | Mod: PBBFAC,,, | Performed by: OPTOMETRIST

## 2022-12-02 PROCEDURE — 92015 PR REFRACTION: ICD-10-PCS | Mod: S$GLB,,, | Performed by: OPTOMETRIST

## 2022-12-02 NOTE — PROGRESS NOTES
"  Preventive Care at the 4 Month Visit  Growth Measurements & Percentiles  Head Circumference: 16.25\" (41.3 cm) (37 %, Source: WHO (Boys, 0-2 years)) 37 %ile based on WHO (Boys, 0-2 years) head circumference-for-age data using vitals from 2017.   Weight: 16 lbs 12 oz / 7.6 kg (actual weight) 76 %ile based on WHO (Boys, 0-2 years) weight-for-age data using vitals from 2017.   Length: 2' .25\" / 61.6 cm 13 %ile based on WHO (Boys, 0-2 years) length-for-age data using vitals from 2017.   Weight for length: 98 %ile based on WHO (Boys, 0-2 years) weight-for-recumbent length data using vitals from 2017.    Your baby s next Preventive Check-up will be at 6 months of age      Development    At this age, your baby may:    Raise his head high when lying on his stomach.    Raise his body on his hands when lying on his stomach.    Roll from his stomach to his back.    Play with his hands and hold a rattle.    Look at a mobile and move his hands.    Start social contact by smiling, cooing, laughing and squealing.    Cry when a parent moves out of sight.    Understand when a bottle is being prepared or getting ready to breastfeed and be able to wait for it for a short time.      Feeding Tips  Breast Milk    Nurse on demand     Check out the handout on Employed Breastfeeding Mother. https://www.lactationtraining.com/resources/educational-materials/handouts-parents/employed-breastfeeding-mother/download    Formula     Many babies feed 4 to 6 times per day, 6 to 8 oz at each feeding.    Don't prop the bottle.      Use a pacifier if the baby wants to suck.      Foods    It is often between 4-6 months that your baby will start watching you eat intently and then mouthing or grabbing for food. Follow her cues to start and stop eating.  Many people start by mixing rice cereal with breast milk or formula. Do not put cereal into a bottle.    To reduce your child's chance of developing peanut allergy, you can start " HPI     Concerns About Ocular Health            Comments: VANDANA: 6/29/2021 - Dr. Avila          Comments    Presents today for ocular health check. Pt reports no vision complaints.   Pt reports increase in floaters--no flashes. Pt denies eye pain. Not using   any eye meds.          Last edited by Stephanie Brooks MA on 12/2/2022  9:07 AM.        ROS    Negative for: Constitutional, Gastrointestinal, Neurological, Skin,   Genitourinary, Musculoskeletal, HENT, Endocrine, Cardiovascular, Eyes,   Respiratory, Psychiatric, Allergic/Imm, Heme/Lymph  Last edited by Kavya Estrella, OD on 12/2/2022  9:35 AM.        Assessment /Plan     For exam results, see Encounter Report.    Nuclear sclerosis, bilateral    Encounter for complete eye exam    Hyperopia of both eyes with regular astigmatism and presbyopia      MONITOR. ED PT ON ALL EXAM FINDINGS  RX FINAL SPECS   OCULAR HEALTH STABLE OD, OS   RTC 1 YR//PRN FOR REE/DFE   MILD NS OU; NO SURGERY NEEDED AT THIS TIME; MONITOR. NOT VISUALLY SIGNIFICANT OU.    introducing peanut-containing foods in small amounts around 6 months of age.  If your child has severe eczema, egg allergy or both, consult with your doctor first about possible allergy-testing and introduction of small amounts of peanut-containing foods at 4-6 months old.   Stools    If you give your baby pureéd foods, his stools may be less firm, occur less often, have a strong odor or become a different color.      Sleep    About 80 percent of 4-month-old babies sleep at least five to six hours in a row at night.  If your baby doesn t, try putting him to bed while drowsy/tired but awake.  Give your baby the same safe toy or blanket.  This is called a  transition object.   Do not play with or have a lot of contact with your baby at nighttime.    Your baby does not need to be fed if he wakes up during the night more frequently than every 5-6 hours.        Safety    The car seat should be in the rear seat facing backwards until your child weighs more than 20 pounds and turns 2 years old.    Do not let anyone smoke around your baby (or in your house or car) at any time.    Never leave your baby alone, even for a few seconds.  Your baby may be able to roll over.  Take any safety precautions.    Keep baby powders,  and small objects out of the baby s reach at all times.    Do not use infant walkers.  They can cause serious accidents and serve no useful purpose.  A better choice is an stationary exersaucer.      What Your Baby Needs    Give your baby toys that he can shake or bang.  A toy that makes noise as it s moved increases your baby s awareness.  He will repeat that activity.    Sing rhythmic songs or nursery rhymes.    Your baby may drool a lot or put objects into his mouth.  Make sure your baby is safe from small or sharp objects.    Read to your baby every night.

## 2022-12-07 DIAGNOSIS — M25.572 ACUTE LEFT ANKLE PAIN: Primary | ICD-10-CM

## 2022-12-08 ENCOUNTER — OFFICE VISIT (OUTPATIENT)
Dept: PODIATRY | Facility: CLINIC | Age: 62
End: 2022-12-08
Payer: COMMERCIAL

## 2022-12-08 VITALS
HEART RATE: 73 BPM | BODY MASS INDEX: 32.78 KG/M2 | SYSTOLIC BLOOD PRESSURE: 113 MMHG | WEIGHT: 203.94 LBS | RESPIRATION RATE: 18 BRPM | HEIGHT: 66 IN | DIASTOLIC BLOOD PRESSURE: 78 MMHG

## 2022-12-08 DIAGNOSIS — I87.2 VENOUS INSUFFICIENCY: ICD-10-CM

## 2022-12-08 DIAGNOSIS — M76.822 POSTERIOR TIBIAL TENDON DYSFUNCTION (PTTD) OF LEFT LOWER EXTREMITY: Primary | ICD-10-CM

## 2022-12-08 DIAGNOSIS — M25.572 SINUS TARSI SYNDROME, LEFT: ICD-10-CM

## 2022-12-08 DIAGNOSIS — M19.072 ARTHRITIS OF ANKLE OR FOOT, DEGENERATIVE, LEFT: ICD-10-CM

## 2022-12-08 DIAGNOSIS — M79.672 FOOT PAIN, LEFT: Primary | ICD-10-CM

## 2022-12-08 PROCEDURE — 3078F PR MOST RECENT DIASTOLIC BLOOD PRESSURE < 80 MM HG: ICD-10-PCS | Mod: CPTII,S$GLB,, | Performed by: PODIATRIST

## 2022-12-08 PROCEDURE — 1159F MED LIST DOCD IN RCRD: CPT | Mod: CPTII,S$GLB,, | Performed by: PODIATRIST

## 2022-12-08 PROCEDURE — 1159F PR MEDICATION LIST DOCUMENTED IN MEDICAL RECORD: ICD-10-PCS | Mod: CPTII,S$GLB,, | Performed by: PODIATRIST

## 2022-12-08 PROCEDURE — 3074F PR MOST RECENT SYSTOLIC BLOOD PRESSURE < 130 MM HG: ICD-10-PCS | Mod: CPTII,S$GLB,, | Performed by: PODIATRIST

## 2022-12-08 PROCEDURE — 3074F SYST BP LT 130 MM HG: CPT | Mod: CPTII,S$GLB,, | Performed by: PODIATRIST

## 2022-12-08 PROCEDURE — 99203 PR OFFICE/OUTPT VISIT, NEW, LEVL III, 30-44 MIN: ICD-10-PCS | Mod: 25,S$GLB,, | Performed by: PODIATRIST

## 2022-12-08 PROCEDURE — 20605 INTERMEDIATE JOINT ASPIRATION/INJECTION: L ANKLE: ICD-10-PCS | Mod: LT,S$GLB,, | Performed by: PODIATRIST

## 2022-12-08 PROCEDURE — 99203 OFFICE O/P NEW LOW 30 MIN: CPT | Mod: 25,S$GLB,, | Performed by: PODIATRIST

## 2022-12-08 PROCEDURE — 3078F DIAST BP <80 MM HG: CPT | Mod: CPTII,S$GLB,, | Performed by: PODIATRIST

## 2022-12-08 PROCEDURE — 99999 PR PBB SHADOW E&M-EST. PATIENT-LVL IV: ICD-10-PCS | Mod: PBBFAC,,, | Performed by: PODIATRIST

## 2022-12-08 PROCEDURE — 3008F BODY MASS INDEX DOCD: CPT | Mod: CPTII,S$GLB,, | Performed by: PODIATRIST

## 2022-12-08 PROCEDURE — 1160F RVW MEDS BY RX/DR IN RCRD: CPT | Mod: CPTII,S$GLB,, | Performed by: PODIATRIST

## 2022-12-08 PROCEDURE — 20605 DRAIN/INJ JOINT/BURSA W/O US: CPT | Mod: LT,S$GLB,, | Performed by: PODIATRIST

## 2022-12-08 PROCEDURE — 3008F PR BODY MASS INDEX (BMI) DOCUMENTED: ICD-10-PCS | Mod: CPTII,S$GLB,, | Performed by: PODIATRIST

## 2022-12-08 PROCEDURE — 99999 PR PBB SHADOW E&M-EST. PATIENT-LVL IV: CPT | Mod: PBBFAC,,, | Performed by: PODIATRIST

## 2022-12-08 PROCEDURE — 3044F PR MOST RECENT HEMOGLOBIN A1C LEVEL <7.0%: ICD-10-PCS | Mod: CPTII,S$GLB,, | Performed by: PODIATRIST

## 2022-12-08 PROCEDURE — 3044F HG A1C LEVEL LT 7.0%: CPT | Mod: CPTII,S$GLB,, | Performed by: PODIATRIST

## 2022-12-08 PROCEDURE — 1160F PR REVIEW ALL MEDS BY PRESCRIBER/CLIN PHARMACIST DOCUMENTED: ICD-10-PCS | Mod: CPTII,S$GLB,, | Performed by: PODIATRIST

## 2022-12-08 RX ORDER — DEXAMETHASONE SODIUM PHOSPHATE 4 MG/ML
4 INJECTION, SOLUTION INTRA-ARTICULAR; INTRALESIONAL; INTRAMUSCULAR; INTRAVENOUS; SOFT TISSUE
Status: DISCONTINUED | OUTPATIENT
Start: 2022-12-08 | End: 2022-12-08 | Stop reason: HOSPADM

## 2022-12-08 RX ADMIN — DEXAMETHASONE SODIUM PHOSPHATE 4 MG: 4 INJECTION, SOLUTION INTRA-ARTICULAR; INTRALESIONAL; INTRAMUSCULAR; INTRAVENOUS; SOFT TISSUE at 08:12

## 2022-12-08 NOTE — PROGRESS NOTES
[facility] PODIATRY  [address] [place] [address], [address]  [facility] [address]  Dept: [phone]  Dept Fax: [phone]    Syd Saucedo Jr., DPM     Assessment:   MDM    Coding  1. Posterior tibial tendon dysfunction (PTTD) of left lower extremity  X-Ray Calcaneus 2 View Left      2. Arthritis of ankle or foot, degenerative, left  X-Ray Calcaneus 2 View Left    Intermediate Joint Aspiration/Injection      3. Sinus tarsi syndrome, left  X-Ray Calcaneus 2 View Left      4. Venous insufficiency            Plan:     Intermediate Joint Aspiration/Injection: L ankle    Date/Time: 12/8/2022 8:40 AM  Performed by: Syd Saucedo Jr., DPM  Authorized by: Syd Saucedo Jr., DPM     Consent Done?:  Yes (Verbal)  Indications:  Arthritis, pain and joint swelling  Site marked: The procedure site was marked    Timeout: Prior to procedure the correct patient, procedure, and site was verified      Location:  Ankle  Site:  L ankle  Prep: Patient was prepped and draped in usual sterile fashion    Ultrasonic Guidance for needle placement: No  Needle size:  25 G  Approach:  Anteromedial  Medications:  4 mg dexAMETHasone 4 mg/mL  Patient tolerance:  Patient tolerated the procedure well with no immediate complications      Rosanna Mayaux was seen today for foot problem.    Diagnoses and all orders for this visit:    Posterior tibial tendon dysfunction (PTTD) of left lower extremity  -     X-Ray Calcaneus 2 View Left; Future    Arthritis of ankle or foot, degenerative, left  -     X-Ray Calcaneus 2 View Left; Future  -     Intermediate Joint Aspiration/Injection    Sinus tarsi syndrome, left  -     X-Ray Calcaneus 2 View Left; Future    Venous insufficiency      -pt seen, evaluated, and managed  -dx discussed in detail. All questions/concerns addressed  -all tx options discussed. All alternatives, risks, benefits of all txs discussed  -the patient was educated about the diagnosis  -We discussed conservative care  options possible including but not limited to shoe wear and/or padding, bracing/strapping, at home ROM, formal PT, medical therapy, injection therapy  - The utilization of NSAIDs can be considered but their benefit has to be tempered against the risk of GI/ concerns  - A steroid injection can be undertaken.  We did discuss the potential mechanism of action of this shot.  Understanding that multiple injections at the same anatomic site do have deleterious effects on the soft tissue.  Generic risks include: steroid flare (advised to ice if necessary), skin hypo-pgimentation (which can be permanent and unsightly), elevation of blood sugar, subcutaneous atrophy (can be permanent) and infection.   -XR/imaging reviewed by me: severe pes plano valgus with diffuse DJD  -labs reviewed by me: ok for vgel  -implemented icing/stretching regimen  - ASO  dispensed    -rxs dispensed: none  -referrals: none  -WB: wbat in ASO LLE      Follow up in about 4 weeks (around 1/5/2023).    Subjective:      Patient ID: Rosanna Live is a 62 y.o. female.    Chief Complaint:   Chief Complaint   Patient presents with    Foot Problem     Left foot pain       CC - foot pain: patient presents to the podiatry clinic  with complaint of  left foot pain. Onset of the symptoms was several years ago. Precipitating event: unk. Current symptoms include: ability to bear weight, but with some pain, stiffness, swelling and worsening symptoms after a period of activity. Aggravating factors: walking and certain shoegear. Symptoms have gradually worsened. Patient has had no prior foot problems. Evaluation to date: none. Treatment to date: avoidance of offending activity. Patients rates pain 8/10 on pain scale.      HPI    Last Podiatry Enc: Visit date not found  Last Enc w/ Me: Visit date not found    Outside reports reviewed: historical medical records.  Family hx: as below  Past Medical History:   Diagnosis Date    Allergy     Brito's  esophagus     Basal cell cancer     Cataract     Diverticulosis     Edema     chronic right foot     Hiatal hernia 1/31/2017    IBS (irritable bowel syndrome)     Obesity     Rectovaginal fistula 6/28/2012    Vitamin D deficiency disease      Past Surgical History:   Procedure Laterality Date    CHOLECYSTECTOMY      COLONOSCOPY      COLONOSCOPY N/A 7/11/2022    Procedure: COLONOSCOPY;  Surgeon: Carlos Manuel Barber MD;  Location: [facility] ENDO (4TH FLR);  Service: Endoscopy;  Laterality: N/A;  golytely    ESOPHAGOGASTRODUODENOSCOPY      ESOPHAGOGASTRODUODENOSCOPY N/A 9/18/2019    Procedure: EGD (ESOPHAGOGASTRODUODENOSCOPY);  Surgeon: Carlos Manuel Barber MD;  Location: [facility] ENDO (4TH FLR);  Service: Endoscopy;  Laterality: N/A;  evaluate fundoplication    ESOPHAGOGASTRODUODENOSCOPY N/A 7/11/2022    Procedure: EGD (ESOPHAGOGASTRODUODENOSCOPY);  Surgeon: Carlos Manuel Barber MD;  Location: [facility] ENDO (4TH FLR);  Service: Endoscopy;  Laterality: N/A;  Covid test 7/8 [facility], instructions sent to [email or web address]    INGUINAL HERNIA REPAIR      LAPAROSCOPIC NISSEN FUNDOPLICATION  2/15/16    Mohs' procedure of face      RECTO VAGINAL FISTULA  2011, 2012    UMBILICAL HERNIA REPAIR       Family History   Problem Relation Age of Onset    Arthritis Father         Rhematoid    Hyperlipidemia Father     Cataracts Father     Glaucoma Father     Heart disease Father     Miscarriages / Stillbirths Mother     Diabetes Mother     Hypertension Mother     Glaucoma Mother     Cataracts Mother     Heart disease Maternal Aunt     Heart disease Maternal Grandmother     No Known Problems Sister     No Known Problems Brother     No Known Problems Maternal Uncle     No Known Problems Paternal Aunt     No Known Problems Paternal Uncle     No Known Problems Maternal Grandfather     No Known Problems Paternal Grandmother     No Known Problems Paternal Grandfather     Breast cancer Neg Hx     Colon cancer Neg Hx     Ovarian cancer Neg Hx      Current Outpatient  Medications   Medication Sig Dispense Refill    cromolyn (NASALCHROM) 5.2 mg/spray (4 %) nasal spray 1 spray by Nasal route 4 (four) times daily. 26 mL 1    fluticasone propionate (FLONASE) 50 mcg/actuation nasal spray 1 spray (50 mcg total) by Each Nostril route once daily. 16 g 3    metoprolol tartrate (LOPRESSOR) 25 MG tablet TAKE ONE HALF (1/2) TABLET BY MOUTH TWICE A DAY 90 tablet 3    pantoprazole (PROTONIX) 40 MG tablet Take 1 tablet (40 mg total) by mouth before breakfast. 90 tablet 0    predniSONE (DELTASONE) 10 MG tablet Take 1 tablet (10 mg total) by mouth once daily. 4 tablet 0     No current facility-administered medications for this visit.     Review of patient's allergies indicates:   Allergen Reactions    Penicillins Rash     Social History     Socioeconomic History    Marital status:    Occupational History    Occupation: Teacher     Employer: [employer]   Tobacco Use    Smoking status: Former     Packs/day: 0.25     Years: 5.00     Pack years: 1.25     Types: Cigarettes     Quit date: 1983     Years since quittin.2    Smokeless tobacco: Never   Substance and Sexual Activity    Alcohol use: Yes     Alcohol/week: 1.0 standard drink     Types: 1 Glasses of wine per week     Comment: Social    Drug use: No    Sexual activity: Never     Partners: Male     Birth control/protection: None, Abstinence   Social History Narrative         Social Determinants of Health     Financial Resource Strain: Low Risk     Difficulty of Paying Living Expenses: Not hard at all   Food Insecurity: No Food Insecurity    Worried About Running Out of Food in the Last Year: Never true    Ran Out of Food in the Last Year: Never true   Transportation Needs: No Transportation Needs    Lack of Transportation (Medical): No    Lack of Transportation (Non-Medical): No   Physical Activity: Insufficiently Active    Days of Exercise per Week: 1 day    Minutes of Exercise per Session: 30 min   Stress:  "No Stress Concern Present    Feeling of Stress : Only a little   Social Connections: Unknown    Frequency of Communication with Friends and Family: More than three times a week    Frequency of Social Gatherings with Friends and Family: Once a week    Active Member of Clubs or Organizations: No    Attends Club or Organization Meetings: 1 to 4 times per year    Marital Status:    Housing Stability: Low Risk     Unable to Pay for Housing in the Last Year: No    Number of Places Lived in the Last Year: 1    Unstable Housing in the Last Year: No       ROS    REVIEW OF SYSTEMS: Negative as documented below as well as positive findings in bold.       Constitutional  Respiratory  Gastrointestinal  Skin   - Fever - Cough - Heartburn - Rash   - Chills - Spit blood - Nausea - Itching   - Weight Loss - Shortness of breath - Vomiting - Nail pain   - Malaise/Fatigue - Wheezing - Abdominal Pain  Wound/Ulcer   - Weight Gain   - Blood in Stool  Poor wound healing       - Diarrhea          Cardiovascular  Genitourinary  Neurological  HEENT   - Chest Pain - Dysuria - Burning Sensation of feet - Headache   - Palpitations - Hematuria - Tingling / Paresthesia - Congestion   - Pain at night in legs - Flank Pain - Dizziness - Sore Throat   - Cramping   - Tremor - Blurred Vision   - Leg Swelling   - Sensory Change - Double Vision   - Dizzy when standing   - Speech Change - Eye Redness       - Focal Weakness - Dry Eyes       - Loss of Consciousness          Endocrine  Musculoskeletal  Psychiatric   - Cold intolerance - Muscle Pain - Depression   - Heat intolerance - Neck Pain - Insomnia   - Anemia - Joint Pain - Memory Loss   -  Easy bruising, bleeding - Heel pain - Anxiety      Toe Pain        Leg/Ankle/Foot Pain         Objective:     /78 (BP Location: Left arm, Patient Position: Sitting, BP Method: X-Large (Automatic))   Pulse 73   Resp 18   Ht 5' 6" (1.676 m)   Wt 92.5 kg (203 lb 14.8 oz)   LMP  (LMP Unknown)   BMI " "32.91 kg/m²   Vitals:    12/08/22 0822   BP: 113/78   Pulse: 73   Resp: 18   Weight: 92.5 kg (203 lb 14.8 oz)   Height: 5' 6" (1.676 m)   PainSc: 0-No pain       Physical Exam    General Appearance:   Patient appears well developed, well nourished  Patient appears stated age    Psychiatric:   Patient is oriented to time, place, and person.  Patient has appropriate mood and affect    Neck:  Trachea Midline  No visible masses    Respiratory/Ears:  No distress or labored breathing.  Able to differentiate between normal talking voice and whisper.  Able to follow commands    Eyes:  Visual Acuity intact  Lids and conjunctivae normal. No discoloration noted.    Foot Exam    General  Gait: antalgic       Left Foot/Ankle      Inspection and Palpation  Tenderness: bony tenderness and navicular   Swelling: navicular   Arch: pes planus      Physical Exam  Musculoskeletal:      Left foot: Bony tenderness present.     Ortho Exam          Left Ankle/Foot Exam     Swelling   The patient is swollen on the navicular.    Tenderness   The patient is tender to palpation of the navicular.    Foot/Ankle Musculoskeletal Exam    L LE exam con't:  V:  DP 2/4, PT 2/4   CRT< 3s to all digits tested   Tibial and popliteal lymph nodes are w/o abnormality   Edema: present, varicosities: present    N:  Patient displays normal ankle reflexes   SILT in SP/DP/T/Elizabeth/Saph distributions    Ortho: +Motor EHL/FHL/TA/GA   +TTP L PTT from inf med mal to insertion   +TTP L sinus tarsi area   Compartments soft/compressible. No pain on passive stretch of big toe. No calf Pain.   +too many toes sign LLE   collapsing medial longitudinal arch with wb LLE   equinus deformity present    Derm:  skin intact, skin warm and dry, skin without ulcers or lesions, skin without induration, nails normal,     Imaging / Labs:      X-Ray Ankle Complete Left    Result Date: 12/8/2022  EXAMINATION: XR ANKLE COMPLETE 3 VIEW LEFT CLINICAL HISTORY: Pain in left ankle and joints of " left foot TECHNIQUE: AP, lateral and oblique views of the left ankle were performed. COMPARISON: None FINDINGS: AP, lateral and oblique radiographs of the left ankle demonstrate no evidence for acute fractures or dislocations.  Ankle mortise is intact.  Soft tissues are unremarkable.     No evidence for acute injury Electronically signed by: Yogi Castro MD Date:    12/08/2022 Time:    08:20        Note: This was dictated using a computer transcription program. Although proofread, it may contain computer transcription errors and phonetic errors. Other human proofreading errors may also exist. Corrections may be performed at a later time. Please contact us for any clarification if needed.    Syd Saucedo DPM  [facility]

## 2022-12-08 NOTE — PATIENT INSTRUCTIONS
Flexible Flatfoot        What Is Flatfoot?    Flatfoot entails partial or total collapse (loss) of the arch  Flatfoot is often a complex disorder, with diverse symptoms and varying degrees of deformity and disability. There are several types of flatfoot, all of which have one characteristic in common: partial or total collapse (loss) of the arch. Other characteristics shared by most types of flatfoot include toe drift, in which the toes and front part of the foot point outward. The heel tilts toward the outside and the ankle appears to turn in. A tight Achilles tendon, which causes the heel to lift off the ground earlier when walking and may make the problem worse, bunions and hammertoes may develop as a result of a flatfoot.    Flexible Flatfoot  Normal foot with archFlexible flatfoot is one of the most common types of flatfoot. It typically begins in childhood or adolescence and continues into adulthood. It usually occurs in both feet and progresses in severity throughout the adult years. As the deformity worsens, the soft tissues (tendons and ligaments) of the arch may stretch or tear and can become inflamed. The term flexible means that while the foot is flat when standing (weightbearing), the arch returns when not standing.                  Symptoms  Symptoms that may occur in some persons with flexible flatfoot include:    Pain in the heel, arch, ankle or along the outside of the foot  Rolled-in ankle (overpronation)  Pain along the shin bone (shin splint)  General aching or fatigue in the foot or leg  Low back, hip or knee pain     Diagnosis  In diagnosing flatfoot, the foot and ankle surgeon examines the foot and observes how it looks when you stand and sit. X-rays are usually taken to determine the disorder's severity. If you are diagnosed with flexible flatfoot, but you do not have any symptoms, your surgeon will explain what you might expect in the future.    Nonsurgical Treatment  If you experience  symptoms with flexible flatfoot, the surgeon may recommend nonsurgical treatment options, including:    Activity modifications. Cut down on activities that bring you pain and avoid prolonged walking and standing to give your arches a rest.  Weight loss. If you are overweight, try to lose weight. Putting too much weight on your arches may aggravate your symptoms.  Orthotic devices. Your foot and ankle surgeon can provide you with custom orthotic devices for your shoes to give more support to the arches.  Immobilization. In some cases, it may be necessary to use a walking cast or to completely avoid weightbearing.  Medications. Nonsteroidal anti-inflammatory drugs (NSAIDs), such as ibuprofen, help reduce pain and inflammation.  Physical therapy. Ultrasound therapy or other physical therapy modalities may be used to provide temporary relief.  Shoe modifications. Wearing shoes that support the arches is important for anyone who has flatfoot.   Ankle Foot Orthoses (AFO) devices. Your foot and ankle surgeon may recommend advanced bracing to modify your walking and to support your arches.     When Is Surgery Necessary?  In some patients whose pain is not adequately relieved by other treatments, surgery may be considered. Many surgical techniques are available to correct flexible flatfoot, and one or a combination of procedures may be required to relieve the symptoms and improve foot function. In selecting the procedure or combination of procedures for your particular case, the foot and ankle surgeon will take into consideration the extent of your deformity based on the x-ray findings, your age, your activity level and other factors. The length of the recovery period will vary depending on the procedure or procedures performed.      Posterior Tibial Tendon Dysfunction (PTTD)      What Is PTTD?   Posterior tibial tendon dysfunction (PTTD)  The posterior tibial tendon serves as one of the major supporting structures of the  foot, helping it to function while walking. Posterior tibial tendon dysfunction (PTTD) is a condition caused by changes in the tendon, impairing its ability to support the arch. This results in flattening of the foot.              PTTD is often called adult acquired flatfoot because it is the most common type of flatfoot developed during adulthood. Although this condition typically occurs in only one foot, some people may develop it in both feet. PTTD is usually progressive, which means it will keep getting worse, especially if it is not treated early.    Causes  Overuse of the posterior tibial tendon is often the cause of PTTD. In fact, the symptoms usually occur after activities that involve the tendon, such as running, walking, hiking or climbing stairs.        Symptoms  The symptoms of PTTD may include pain, swelling, a flattening of the arch and an inward rolling of the ankle. As the condition progresses, the symptoms will change.    For example, when PTTD initially develops, there is pain on the inside of the foot and ankle (along the course of the tendon). In addition, the area may be red, warm and swollen.    Later, as the arch begins to flatten, there may still be pain on the inside of the foot and ankle. But at this point, the foot and toes begin to turn outward and the ankle rolls inward.    As PTTD becomes more advanced, the arch flattens even more and the pain often shifts to the outside of the foot, below the ankle. The tendon has deteriorated considerably, and arthritis often develops in the foot. In more severe cases, arthritis may also develop in the ankle.    Nonsurgical Treatment  Because of the progressive nature of PTTD, early treatment is advised. If treated early enough, your symptoms may resolve without the need for surgery, and progression of your condition can be arrested.    In contrast, untreated PTTD could leave you with an extremely flat foot, painful arthritis in the foot and ankle and  increasing limitations on walking, running or other activities.    In many cases of PTTD, treatment can begin with nonsurgical approaches that may include:    Orthotic devices or bracing. To give your arch the support it needs, your foot and ankle surgeon may provide you with an ankle brace or a custom orthotic device that fits into the shoe.  Immobilization. Sometimes a short-leg cast or boot is worn to immobilize the foot and allow the tendon to heal, or you may need to completely avoid all weightbearing for a while.  Physical therapy. Ultrasound therapy and exercises may help rehabilitate the tendon and muscle following immobilization.  Medications. Nonsteroidal anti-inflammatory drugs (NSAIDs), such as ibuprofen, help reduce the pain and inflammation.  Shoe modifications. Your foot and ankle surgeon may advise changes to your shoes and may provide special inserts designed to improve arch support.     When Is Surgery Needed?  In cases of PTTD that have progressed substantially or have failed to improve with nonsurgical treatment, surgery may be required. For some advanced cases, surgery may be the only option. Your foot and ankle surgeon will determine the best approach for you.      Understanding Posterior Tibialis Tenosynovitis    The posterior tibialis tendon runs along the inside of the foot. It connects the calf muscle (posterior tibialis muscle) to bones on the inside of the foot. It helps maintain the arch of the foot. It also gives you stability when you move. Posterior tibialis tenosynovitis is when this tendon becomes inflamed or torn.     How to say it  [id number]   What causes posterior tibialis tenosynovitis?  This condition is often caused by an injury to the tendon. For instance, a fall can tear the tendon. Overuse can also damage it. People who play sports like basketball or tennis a lot may weaken the tendon over time. Flat feet can also be a contributing factor to developing  this condition.  Symptoms of posterior tibialis tenosynovitis  The symptoms of this condition include pain and swelling. The pain is usually felt near the tendon, on the inside of the foot and ankle. It often gets worse over time or with an increase in activity. Your arch may eventually fall, leading to a flat foot. Your foot may also start to turn outward.  Treatment for posterior tibialis tenosynovitis  Treatment for this condition depends on the severity of the tear. Treatment may include:  Rest. You should avoid any activities that cause pain and swelling. You may also need to limit the amount of weight you put on your injured foot.  Cold packs. Putting a cold pack on the tendon may reduce pain and swelling.  Medicine. Over-the-counter pain medicines can reduce pain and swelling.  Leg cast or walking boot. Severe tears of the posterior tibialis tendon may need to be kept from moving. These devices can help protect the tendon and reduce swelling.  Shoe insert or brace. Like a leg cast or walking boot, these devices can help protect the tendon as it heals. They may also ease pain.  Strengthening and stretching exercises. Certain exercises can help you regain strength and flexibility in your foot..  Surgery. Several surgical choices are available to fix the torn tendon or replace it. But you often do not need surgery unless your symptoms do not get better after trying other treatments for at least 6 months.     When to call your healthcare provider  Call your healthcare provider right away if you have any of these:  Fever of 100.4°F (38°C) or higher, or as directed  Pain that gets worse  Symptoms that dont get better, or get worse  New symptoms    Date Last Reviewed: 3/10/2016  © 5855-1552 The "[employer]. [address] [place] [address], [place] [address]. All rights reserved. This information is not intended as a substitute for professional medical care. Always follow your healthcare professional's  instructions.        Recommended [facility] orthotics:  -powerstep  -superfeet    Recommended shoegear:  -new balance  -ascics  -mizuno  -núñez        What Is Arthritis in the Foot?    Osteoarthritis is a condition characterized by the breakdown and eventual loss of cartilage in one or more joints. Cartilage (the connective tissue found at the end of the bones in the joints) protects and cushions the bones during movement. When cartilage deteriorates or is lost, symptoms develop that can restrict ones ability to easily perform daily activities.    Osteoarthritis is also known as degenerative arthritis, reflecting its nature to develop as part of the aging process. As the most common form of arthritis, osteoarthritis affects millions of Americans. Some people refer to osteoarthritis simply as arthritis, even though there are many different types of arthritis.    Osteoarthritis appears at various joints throughout the body, including the hands, feet, spine, hips and knees. In the foot, the disease most frequently occurs in the big toe, although it is also often found in the midfoot and ankle.    Degenerative arthritis is a condition that slowly wears away joints, the area where bones meet and move. In the beginning, you may notice that the affected joint seems stiff. It may even ache. As the joint lining (cartilage) breaks down, the bones rub against each other, causing pain and swelling. Over time, small pieces of rough or splintered bone (bone spurs) develop, and the joints range of motion becomes limited. But movement doesnt have to cause pain. The effects of arthritis can be reduced.    The big-toe joint  When arthritis affects your big toe, your foot hurts when it pushes off the ground. Arthritis often appears in the big-toe joint along with a bunion (a bony bump at the side of the joint) or a bone spur on top of the joint.    Other joints  When arthritis affects the rear or midfoot joints, you feel pain when you  put weight on your foot. Arthritis may affect the joint where the ankle and foot meet. It may also affect other joints nearby.    Symptoms  People with osteoarthritis in the foot or ankle experience, in varying degrees, one or more of the following:    -Pain and stiffness in the joint  -Swelling in or near the joint  -Difficulty walking or bending the joint     Some patients with osteoarthritis also develop a bone spur (a bony protrusion) at the affected joint. Shoe pressure may cause pain at the site of a bone spur, and in some cases, blisters or calluses may form over its surface. Bone spurs can also limit the movement of the joint.    Diagnosis  In diagnosing osteoarthritis, the foot and ankle surgeon will examine the foot thoroughly, looking for swelling in the joint, limited mobility and pain with movement. In some cases, deformity and/or enlargement (spur) of the joint may be noted. X-rays may be ordered to evaluate the extent of the disease.     Causes  Osteoarthritis is considered a wear-and-tear disease because the cartilage in the joint wears down with repeated stress and use over time. As the cartilage deteriorates and gets thinner, the bones lose their protective covering and eventually may rub together, causing pain and inflammation of the joint.    An injury may also lead to osteoarthritis, although it may take months or years after the injury for the condition to develop. For example, osteoarthritis in the big toe is often caused by kicking or jamming the toe or by dropping something on the toe. Osteoarthritis in the midfoot is often caused by dropping something on it or by a sprain or fracture. In the ankle, osteoarthritis is usually caused by a fracture and occasionally by a severe sprain.      Sometimes osteoarthritis develops as a result of abnormal foot mechanics, such as flat feet or high arches. A flat foot causes less stability in the ligaments (bands of tissue that connect bones), resulting  in excessive strain on the joints, which can cause arthritis. A high arch is rigid and lacks mobility, causing a jamming of joints that creates an increased risk of arthritis.        Treating Arthritis in the Foot  If your symptoms are mild, medications may be enough to reduce pain and swelling. For more severe arthritis, surgery may be needed to improve the condition of the joint.      Nonsurgical Treatment  To help relieve symptoms, the surgeon may begin treating osteoarthritis with one or more of the following nonsurgical approaches:    -Oral medications. Nonsteroidal anti-inflammatory drugs (NSAIDs), such as ibuprofen, are often helpful in reducing the inflammation and pain. Occasionally, a prescription for a steroid medication is needed to adequately reduce symptoms.  -Orthotic devices. Custom orthotic devices (shoe inserts) are often prescribed to provide support to improve the foots mechanics or cushioning to help minimize pain.  -Bracing. Bracing, which restricts motion and supports the joint, can reduce pain during walking and can help prevent further deformity.  -Immobilization. Protecting the foot from movement by wearing a cast or removable cast-boot may be necessary to allow the inflammation to resolve.  -Steroid injections. In some cases, steroid injections are applied to the affected joint to deliver anti-inflammatory medication.  -Physical therapy. Exercises to strengthen the muscles, especially when osteoarthritis occurs in the ankle, may give the patient greater stability and may help him or her avoid injury that might worsen the condition.    When Is Surgery Needed?  When osteoarthritis has progressed substantially or has failed to improve with nonsurgical treatment, surgery may be recommended. In advanced cases, surgery may be the only option. The goal of surgery is to decrease pain and improve function. The foot and ankle surgeon will consider a number of factors when selecting the procedure  best suited to the patients condition and lifestyle.    Surgery and bone trimming  To ease movement and reduce pain, your doctor may trim damaged bone. If arthritis is severe, the joint may be fused or removed. If the bone is not damaged too badly, your doctor may simply shave away bone spurs. Any excess bone growth related to a bunion may also be trimmed.    Fusing joints / Replacing Joints  If damage is more severe, your doctor may fuse the joint to prevent the bones from rubbing. Afterward, staples, plates, or screws may hold the bones in place so they heal properly. In some cases, the joint may be removed and replaced with an implant.    After surgery  During the early stages of recovery, your foot is likely to be bandaged and immobilized for a while. For best results, follow up with your doctor as scheduled. These visits help ensure that your foot heals properly.    As you heal  After surgery, youll be told how to care for your incision and how soon to begin walking on the foot. Until the foot can bear weight, you may need to walk with crutches or a cane.  For surgery on the big toe, your foot may be splinted to limit movement for several weeks. Despite this, you should be able to walk soon after surgery.  For surgery on rear or midfoot joints, you may need to wear a cast or surgical shoe. These joints are fairly large, so full recovery may take a few months. Once the bone has healed, any staples, plates, or screws may be removed.    Date Last Reviewed: 7/1/2016  © 4583-4747 [email or web address]. [address] [place] [address], [place] [address]. All rights reserved. This information is not intended as a substitute for professional medical care. Always follow your healthcare professional's instructions.

## 2023-01-12 ENCOUNTER — OFFICE VISIT (OUTPATIENT)
Dept: GASTROENTEROLOGY | Facility: CLINIC | Age: 63
End: 2023-01-12
Payer: COMMERCIAL

## 2023-01-12 ENCOUNTER — LAB VISIT (OUTPATIENT)
Dept: LAB | Facility: HOSPITAL | Age: 63
End: 2023-01-12
Attending: INTERNAL MEDICINE
Payer: COMMERCIAL

## 2023-01-12 VITALS
HEIGHT: 66 IN | BODY MASS INDEX: 32.88 KG/M2 | HEART RATE: 91 BPM | DIASTOLIC BLOOD PRESSURE: 74 MMHG | SYSTOLIC BLOOD PRESSURE: 124 MMHG | WEIGHT: 204.56 LBS

## 2023-01-12 DIAGNOSIS — Z87.19 HISTORY OF ISCHEMIC COLITIS: ICD-10-CM

## 2023-01-12 DIAGNOSIS — R10.11 RIGHT UPPER QUADRANT PAIN: Primary | ICD-10-CM

## 2023-01-12 DIAGNOSIS — K21.9 GASTROESOPHAGEAL REFLUX DISEASE, UNSPECIFIED WHETHER ESOPHAGITIS PRESENT: ICD-10-CM

## 2023-01-12 DIAGNOSIS — R10.11 RIGHT UPPER QUADRANT PAIN: ICD-10-CM

## 2023-01-12 DIAGNOSIS — R10.13 EPIGASTRIC PAIN: ICD-10-CM

## 2023-01-12 DIAGNOSIS — K22.70 BARRETT'S ESOPHAGUS WITHOUT DYSPLASIA: ICD-10-CM

## 2023-01-12 LAB
ALBUMIN SERPL BCP-MCNC: 3.8 G/DL (ref 3.5–5.2)
ALP SERPL-CCNC: 91 U/L (ref 55–135)
ALT SERPL W/O P-5'-P-CCNC: 17 U/L (ref 10–44)
ANION GAP SERPL CALC-SCNC: 11 MMOL/L (ref 8–16)
AST SERPL-CCNC: 16 U/L (ref 10–40)
BASOPHILS # BLD AUTO: 0.07 K/UL (ref 0–0.2)
BASOPHILS NFR BLD: 0.8 % (ref 0–1.9)
BILIRUB SERPL-MCNC: 0.3 MG/DL (ref 0.1–1)
BUN SERPL-MCNC: 16 MG/DL (ref 8–23)
CALCIUM SERPL-MCNC: 9.5 MG/DL (ref 8.7–10.5)
CHLORIDE SERPL-SCNC: 105 MMOL/L (ref 95–110)
CO2 SERPL-SCNC: 23 MMOL/L (ref 23–29)
CREAT SERPL-MCNC: 0.7 MG/DL (ref 0.5–1.4)
CREAT SERPL-MCNC: 0.7 MG/DL (ref 0.5–1.4)
DIFFERENTIAL METHOD: ABNORMAL
EOSINOPHIL # BLD AUTO: 0.3 K/UL (ref 0–0.5)
EOSINOPHIL NFR BLD: 2.9 % (ref 0–8)
ERYTHROCYTE [DISTWIDTH] IN BLOOD BY AUTOMATED COUNT: 12.4 % (ref 11.5–14.5)
EST. GFR  (NO RACE VARIABLE): >60 ML/MIN/1.73 M^2
EST. GFR  (NO RACE VARIABLE): >60 ML/MIN/1.73 M^2
FERRITIN SERPL-MCNC: 95 NG/ML (ref 20–300)
GLUCOSE SERPL-MCNC: 89 MG/DL (ref 70–110)
HAV IGG SER QL IA: REACTIVE
HCT VFR BLD AUTO: 40.4 % (ref 37–48.5)
HGB BLD-MCNC: 13 G/DL (ref 12–16)
IMM GRANULOCYTES # BLD AUTO: 0.05 K/UL (ref 0–0.04)
IMM GRANULOCYTES NFR BLD AUTO: 0.6 % (ref 0–0.5)
IRON SERPL-MCNC: 76 UG/DL (ref 30–160)
LIPASE SERPL-CCNC: 30 U/L (ref 4–60)
LYMPHOCYTES # BLD AUTO: 2.7 K/UL (ref 1–4.8)
LYMPHOCYTES NFR BLD: 29.4 % (ref 18–48)
MCH RBC QN AUTO: 29.2 PG (ref 27–31)
MCHC RBC AUTO-ENTMCNC: 32.2 G/DL (ref 32–36)
MCV RBC AUTO: 91 FL (ref 82–98)
MONOCYTES # BLD AUTO: 0.9 K/UL (ref 0.3–1)
MONOCYTES NFR BLD: 10.2 % (ref 4–15)
NEUTROPHILS # BLD AUTO: 5.1 K/UL (ref 1.8–7.7)
NEUTROPHILS NFR BLD: 56.1 % (ref 38–73)
NRBC BLD-RTO: 0 /100 WBC
PLATELET # BLD AUTO: 422 K/UL (ref 150–450)
PMV BLD AUTO: 9 FL (ref 9.2–12.9)
POTASSIUM SERPL-SCNC: 4.1 MMOL/L (ref 3.5–5.1)
PROT SERPL-MCNC: 7.2 G/DL (ref 6–8.4)
RBC # BLD AUTO: 4.45 M/UL (ref 4–5.4)
SATURATED IRON: 21 % (ref 20–50)
SODIUM SERPL-SCNC: 139 MMOL/L (ref 136–145)
TOTAL IRON BINDING CAPACITY: 358 UG/DL (ref 250–450)
TRANSFERRIN SERPL-MCNC: 242 MG/DL (ref 200–375)
WBC # BLD AUTO: 9.05 K/UL (ref 3.9–12.7)

## 2023-01-12 PROCEDURE — 36415 COLL VENOUS BLD VENIPUNCTURE: CPT | Performed by: INTERNAL MEDICINE

## 2023-01-12 PROCEDURE — 99999 PR PBB SHADOW E&M-EST. PATIENT-LVL IV: ICD-10-PCS | Mod: PBBFAC,,, | Performed by: INTERNAL MEDICINE

## 2023-01-12 PROCEDURE — 3008F PR BODY MASS INDEX (BMI) DOCUMENTED: ICD-10-PCS | Mod: CPTII,S$GLB,, | Performed by: INTERNAL MEDICINE

## 2023-01-12 PROCEDURE — 86790 VIRUS ANTIBODY NOS: CPT | Performed by: INTERNAL MEDICINE

## 2023-01-12 PROCEDURE — 99214 OFFICE O/P EST MOD 30 MIN: CPT | Mod: S$GLB,,, | Performed by: INTERNAL MEDICINE

## 2023-01-12 PROCEDURE — 99999 PR PBB SHADOW E&M-EST. PATIENT-LVL IV: CPT | Mod: PBBFAC,,, | Performed by: INTERNAL MEDICINE

## 2023-01-12 PROCEDURE — 99214 PR OFFICE/OUTPT VISIT, EST, LEVL IV, 30-39 MIN: ICD-10-PCS | Mod: S$GLB,,, | Performed by: INTERNAL MEDICINE

## 2023-01-12 PROCEDURE — 83690 ASSAY OF LIPASE: CPT | Performed by: INTERNAL MEDICINE

## 2023-01-12 PROCEDURE — 3074F PR MOST RECENT SYSTOLIC BLOOD PRESSURE < 130 MM HG: ICD-10-PCS | Mod: CPTII,S$GLB,, | Performed by: INTERNAL MEDICINE

## 2023-01-12 PROCEDURE — 3078F DIAST BP <80 MM HG: CPT | Mod: CPTII,S$GLB,, | Performed by: INTERNAL MEDICINE

## 2023-01-12 PROCEDURE — 86364 TISS TRNSGLTMNASE EA IG CLAS: CPT | Mod: 59 | Performed by: INTERNAL MEDICINE

## 2023-01-12 PROCEDURE — 82728 ASSAY OF FERRITIN: CPT | Performed by: INTERNAL MEDICINE

## 2023-01-12 PROCEDURE — 3074F SYST BP LT 130 MM HG: CPT | Mod: CPTII,S$GLB,, | Performed by: INTERNAL MEDICINE

## 2023-01-12 PROCEDURE — 3078F PR MOST RECENT DIASTOLIC BLOOD PRESSURE < 80 MM HG: ICD-10-PCS | Mod: CPTII,S$GLB,, | Performed by: INTERNAL MEDICINE

## 2023-01-12 PROCEDURE — 1159F MED LIST DOCD IN RCRD: CPT | Mod: CPTII,S$GLB,, | Performed by: INTERNAL MEDICINE

## 2023-01-12 PROCEDURE — 85025 COMPLETE CBC W/AUTO DIFF WBC: CPT | Performed by: INTERNAL MEDICINE

## 2023-01-12 PROCEDURE — 1160F RVW MEDS BY RX/DR IN RCRD: CPT | Mod: CPTII,S$GLB,, | Performed by: INTERNAL MEDICINE

## 2023-01-12 PROCEDURE — 80053 COMPREHEN METABOLIC PANEL: CPT | Performed by: INTERNAL MEDICINE

## 2023-01-12 PROCEDURE — 84466 ASSAY OF TRANSFERRIN: CPT | Performed by: INTERNAL MEDICINE

## 2023-01-12 PROCEDURE — 1159F PR MEDICATION LIST DOCUMENTED IN MEDICAL RECORD: ICD-10-PCS | Mod: CPTII,S$GLB,, | Performed by: INTERNAL MEDICINE

## 2023-01-12 PROCEDURE — 1160F PR REVIEW ALL MEDS BY PRESCRIBER/CLIN PHARMACIST DOCUMENTED: ICD-10-PCS | Mod: CPTII,S$GLB,, | Performed by: INTERNAL MEDICINE

## 2023-01-12 PROCEDURE — 3008F BODY MASS INDEX DOCD: CPT | Mod: CPTII,S$GLB,, | Performed by: INTERNAL MEDICINE

## 2023-01-12 PROCEDURE — 86706 HEP B SURFACE ANTIBODY: CPT | Performed by: INTERNAL MEDICINE

## 2023-01-12 NOTE — PROGRESS NOTES
[facility] Consultation Note    Reason for Consult:  The primary encounter diagnosis was Right upper quadrant pain. Diagnoses of Gastroesophageal reflux disease, unspecified whether esophagitis present, Brito's esophagus without dysplasia, Epigastric pain, and History of ischemic colitis were also pertinent to this visit.    PCP:   Geena Roman   [address] / [place] [address]    Referring MD:  Geena Roman Md  [address] [place] [address]    Initial History of Present Illness (HPI):  This is a 62 y.o. female here for evaluation of Barretts esophagus no dysplasia repeat EGD shows no Barretts no dysplasia ischemic colitis and Barretts esophagus no longer identifiable patient is intolerant to PPI she says she absolutely positively can take it she can try Pepcid and H2 blocker not the drug of choice for Barretts but it may give her some benefit but not certain she could try that if she is not intolerant to it 40 mg once daily or 20 mg twice daily it is her choice no dysphagia no odynophagia she does have intermittent epigastric right upper quadrant pain she is a history of ischemic colitis she is due for surveillance colonoscopy will do a CT enterography to rule out small-bowel stricture or Crohn's close attention to the pancreas reduction of alcohol to more than 1 alcoholic beverage a day no catching up.  No weight loss no dysuria urgency frequency.  Abdominal pain - as above  Reflux - no  Dysphagia - no   Bowel habits - 2 bowel movements a day  GI bleeding - none  NSAID usage - none    Interval HPI 01/12/2023:  The patient's last visit with me was on 11/21/2019.      ROS:  Constitutional: No fevers, chills, No weight loss  ENT:  No heartburn no dysphagia no odynophagia no hoarseness  CV: No chest pain, no palpitation  Pulm: No cough, No shortness of breath, no wheezing  Ophtho: No vision changes  GI: see HPI  Derm: No rash, no itching  Heme: No  lymphadenopathy, No easy bruising  MSK: No significant arthritis  : No dysuria, No hematuria  Endo: No hot or cold intolerance  Neuro: No syncope, No seizure, no strokes  Psych: No uncontrolled anxiety, No uncontrolled depression    Medical History:  has a past medical history of Allergy, Brito's esophagus, Basal cell cancer, Cataract, Diverticulosis, Edema, Hiatal hernia (1/31/2017), IBS (irritable bowel syndrome), Obesity, Rectovaginal fistula (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    Surgical History:  has a past surgical history that includes Cholecystectomy; Mohs' procedure of face; Colonoscopy; Esophagogastroduodenoscopy; Laparoscopic Nissen fundoplication (2/15/16); Inguinal hernia repair; Umbilical hernia repair; RECTO VAGINAL FISTULA (2011, 2012); Esophagogastroduodenoscopy (N/A, 9/18/2019); Esophagogastroduodenoscopy (N/A, 7/11/2022); and Colonoscopy (N/A, 7/11/2022).    Family History: family history includes Arthritis in her father; Cataracts in her father and mother; Diabetes in her mother; Glaucoma in her father and mother; Heart disease in her father, maternal aunt, and maternal grandmother; Hyperlipidemia in her father; Hypertension in her mother; Miscarriages / Stillbirths in her mother; No Known Problems in her brother, maternal grandfather, maternal uncle, paternal aunt, paternal grandfather, paternal grandmother, paternal uncle, and sister..     Social History:  reports that she quit smoking about 39 years ago. Her smoking use included cigarettes. She has a 1.25 pack-year smoking history. She has never used smokeless tobacco. She reports current alcohol use of about 1.0 standard drink per week. She reports that she does not use drugs.    Review of patient's allergies indicates:   Allergen Reactions    Penicillins Rash       Medication List with Changes/Refills   Current Medications    CROMOLYN (NASALCHROM) 5.2 MG/SPRAY (4 %) NASAL SPRAY    1 spray by Nasal route 4 (four) times daily.  "   FLUTICASONE PROPIONATE (FLONASE) 50 MCG/ACTUATION NASAL SPRAY    1 spray (50 mcg total) by Each Nostril route once daily.    METOPROLOL TARTRATE (LOPRESSOR) 25 MG TABLET    TAKE ONE HALF (1/2) TABLET BY MOUTH TWICE A DAY   Discontinued Medications    PANTOPRAZOLE (PROTONIX) 40 MG TABLET    Take 1 tablet (40 mg total) by mouth before breakfast.    PREDNISONE (DELTASONE) 10 MG TABLET    Take 1 tablet (10 mg total) by mouth once daily.         Objective Findings:    Vital Signs:  /74   Pulse 91   Ht 5' 6" (1.676 m)   Wt 92.8 kg (204 lb 9.4 oz)   LMP  (LMP Unknown)   BMI 33.02 kg/m²   Body mass index is 33.02 kg/m².    Physical Exam:  General Appearance: Well appearing in no acute distress  Eyes:    No scleral icterus  ENT:  No lesions or masses   Lungs: CTA bilaterally, no wheezes, no rhonchi, no rales  Heart:  S1, S2 normal, no murmurs heard  Abdomen:  Non distended, soft, no guarding, no rebound, no tenderness, no appreciated ascites, no bruits, no hepatosplenomegaly,  No CVA tenderness, no appreciated hernias, no Bustamante sign, no McBurney point tenderness  Musculoskeletal:  No major joint deformities  Skin: No petechiae or rash on exposed skin areas  Neurologic:  Alert and oriented x4  Psychiatric:  Normal speech mentation and affect    Labs:  Lab Results   Component Value Date    WBC 8.57 04/07/2022    HGB 13.7 04/07/2022    HCT 44.0 04/07/2022     04/07/2022    CHOL 234 (H) 04/07/2022    TRIG 96 04/07/2022    HDL 62 04/07/2022    ALT 19 04/07/2022    AST 19 04/07/2022     04/07/2022    K 4.6 04/07/2022     04/07/2022    CREATININE 0.7 04/07/2022    BUN 13 04/07/2022    CO2 27 04/07/2022    TSH 1.112 04/07/2022    INR 1.5 (H) 12/14/2016    HGBA1C 5.6 04/07/2022             Medical Decision Making:  Lab work reviewed  Prior EGD colonoscopy images and path personally reviewed by myself  Lab work talk given  Alcohol talk given  Lab work talk given  CT scan talk given  Colonoscopy talk " given   Barretts talk given        Assessment:  1. Right upper quadrant pain    2. Gastroesophageal reflux disease, unspecified whether esophagitis present    3. Brito's esophagus without dysplasia    4. Epigastric pain    5. History of ischemic colitis         Recommendations:  1. Lab work today  2. CT enterography for further evaluation of abdominal pain rule out small-bowel Crohn's or stricture close attention to the pancreas  3. Referral for colonoscopy follow-up ischemic colitis ulcer healing.  4. History of Barretts esophagus no longer apparent follow GERD precautions from up-to-date patient says she is intolerant of PPI okay to try Pepcid likely better than nothing 40 mg once daily if not intolerant to that as well  5. Return to GI clinic 3 months for follow-up telemedicine video visit         Follow up in about 3 months (around 4/12/2023).      Order summary:  Orders Placed This Encounter    CT Enterography Abd_Pelvis With Contrast    Creatinine, serum    Celiac Disease Panel    Comprehensive Metabolic Panel    Lipase    CBC Auto Differential    Ferritin    Iron and TIBC    Hepatitis B Surface Antibody, Qual/Quant    Hepatitis A antibody, IgG    Ambulatory referral/consult to Endo Procedure          Thank you so much for allowing me to participate in the care of Rosanna Barber MD    DISCLAIMER: This note was prepared with [employer] voice recognition transcription software. Garbled syntax, mangled or inadvertent pronouns, and other bizarre constructions may be attributed to that software system. While efforts were made to correct any mistakes made by this voice recognition program, some errors and/or omissions may remain in the note that were missed when the note was originally created.

## 2023-01-12 NOTE — Clinical Note
Jose please schedule patient 2nd floor lab work today Please give her endoscopy schedule her appointment to schedule her colonoscopy referral placed Please schedule her CT enterography for further evaluation of her abdominal pain across the street at the imaging center orders placed Return to GI clinic with me 3 months okay for telemedicine video visit

## 2023-01-16 DIAGNOSIS — Z23 ENCOUNTER FOR VACCINATION: Primary | ICD-10-CM

## 2023-01-16 LAB
HBV SURFACE AB SER QL IA: NEGATIVE
HBV SURFACE AB SERPL IA-ACNC: 9 MIU/ML

## 2023-01-17 LAB
GLIADIN PEPTIDE IGA SER-ACNC: 17 UNITS
GLIADIN PEPTIDE IGG SER-ACNC: 4 UNITS
IGA SERPL-MCNC: 286 MG/DL (ref 70–400)
TTG IGA SER-ACNC: 7 UNITS
TTG IGG SER-ACNC: 5 UNITS

## 2023-01-26 ENCOUNTER — HOSPITAL ENCOUNTER (OUTPATIENT)
Dept: RADIOLOGY | Facility: HOSPITAL | Age: 63
Discharge: HOME | End: 2023-01-26
Attending: INTERNAL MEDICINE
Payer: COMMERCIAL

## 2023-01-26 DIAGNOSIS — Z87.19 HISTORY OF ISCHEMIC COLITIS: ICD-10-CM

## 2023-01-26 DIAGNOSIS — R10.11 RIGHT UPPER QUADRANT PAIN: ICD-10-CM

## 2023-01-26 DIAGNOSIS — R10.13 EPIGASTRIC PAIN: ICD-10-CM

## 2023-01-26 PROCEDURE — 74177 CT ABD & PELVIS W/CONTRAST: CPT | Mod: 26,,, | Performed by: RADIOLOGY

## 2023-01-26 PROCEDURE — 74177 CT ABD & PELVIS W/CONTRAST: CPT | Mod: TC

## 2023-01-26 PROCEDURE — 74177 CT ENTEROGRAPHY ABD_PELVIS WITH CONTRAST: ICD-10-PCS | Mod: 26,,, | Performed by: RADIOLOGY

## 2023-01-26 PROCEDURE — 25500020 PHARM REV CODE 255: Performed by: INTERNAL MEDICINE

## 2023-01-26 PROCEDURE — A9698 NON-RAD CONTRAST MATERIALNOC: HCPCS | Performed by: INTERNAL MEDICINE

## 2023-01-26 RX ADMIN — Medication 125 ML: at 01:01

## 2023-01-26 RX ADMIN — BARIUM SULFATE 225 ML: 1 SUSPENSION ORAL at 01:01

## 2023-01-26 RX ADMIN — BARIUM SULFATE 450 ML: 1 SUSPENSION ORAL at 01:01

## 2023-02-11 NOTE — PROGRESS NOTES
I think overall Rosanna your lab work looks okay not anemic
Roman - please tell patient that their Hepatitis  B.    Some people lose immunity to hepatitis-B over time and need read vaccination recommend hepatitis B vaccinations    Hepatitis B vaccination series is a 2 part vaccine series - Day 1, and then again in 1-month from the first one.    Orders were  placed.  
Rosanna overall your labs look stable
You have immunity to viral hepatitis A which is a great thing
OB Hx:    FT LTCS arrest of dilation at 6cm, 8lbd 7oz, no complications  SABx2 D+C x2     Gyn Hx: denies abnormal papsmears, fibroids, cysts, stds

## 2023-02-17 DIAGNOSIS — R10.9 ABDOMINAL PAIN, UNSPECIFIED ABDOMINAL LOCATION: Primary | ICD-10-CM

## 2023-02-17 DIAGNOSIS — K44.9 HIATAL HERNIA: ICD-10-CM

## 2023-02-17 DIAGNOSIS — I88.0 MESENTERIC ADENITIS: ICD-10-CM

## 2023-02-18 NOTE — PROGRESS NOTES
Roman please refer Rosanna to Dr. Sanket Gallagher in general surgery for evaluation of abdominal pain and a nonspecific CT scan findings of Mesenteric stranding and prominent mesenteric lymph nodes, similar to prior exam.  Findings could suggest mesenteric adenitis.  Referral placed    Rosanna your CT scan was read as follows will refer you back to Dr. Sanket Gallagher for evaluation.    Impression:     No findings to suggest inflammatory bowel disease or bowel stricture.  No pancreatic mass, ductal dilatation, or peripancreatic stranding.     Mild interval enlargement of recurrent hiatal hernia.     Mesenteric stranding and prominent mesenteric lymph nodes, similar to prior exam.  Findings could suggest mesenteric adenitis.     Electronically signed by resident: Myrtle Sosa  Date:                                            01/26/2023  Time:                                           13:31     Electronically signed by: German Bird MD  Date:                                            01/26/2023

## 2023-04-10 ENCOUNTER — OFFICE VISIT (OUTPATIENT)
Dept: CARDIOLOGY | Facility: CLINIC | Age: 63
End: 2023-04-10
Payer: COMMERCIAL

## 2023-04-10 VITALS
WEIGHT: 206.88 LBS | OXYGEN SATURATION: 96 % | HEART RATE: 63 BPM | HEIGHT: 66 IN | SYSTOLIC BLOOD PRESSURE: 138 MMHG | DIASTOLIC BLOOD PRESSURE: 80 MMHG | BODY MASS INDEX: 33.25 KG/M2

## 2023-04-10 DIAGNOSIS — I47.19 ATRIAL TACHYCARDIA: ICD-10-CM

## 2023-04-10 DIAGNOSIS — R07.89 ATYPICAL CHEST PAIN: ICD-10-CM

## 2023-04-10 DIAGNOSIS — I87.2 VENOUS INSUFFICIENCY: ICD-10-CM

## 2023-04-10 DIAGNOSIS — E66.09 CLASS 1 OBESITY DUE TO EXCESS CALORIES WITHOUT SERIOUS COMORBIDITY WITH BODY MASS INDEX (BMI) OF 33.0 TO 33.9 IN ADULT: ICD-10-CM

## 2023-04-10 DIAGNOSIS — R00.2 PALPITATIONS: ICD-10-CM

## 2023-04-10 PROBLEM — E66.811 CLASS 1 OBESITY DUE TO EXCESS CALORIES WITHOUT SERIOUS COMORBIDITY WITH BODY MASS INDEX (BMI) OF 33.0 TO 33.9 IN ADULT: Status: ACTIVE | Noted: 2023-04-10

## 2023-04-10 PROCEDURE — 99214 PR OFFICE/OUTPT VISIT, EST, LEVL IV, 30-39 MIN: ICD-10-PCS | Mod: 25,S$GLB,, | Performed by: INTERNAL MEDICINE

## 2023-04-10 PROCEDURE — 99999 PR PBB SHADOW E&M-EST. PATIENT-LVL III: CPT | Mod: PBBFAC,,, | Performed by: INTERNAL MEDICINE

## 2023-04-10 PROCEDURE — 3008F PR BODY MASS INDEX (BMI) DOCUMENTED: ICD-10-PCS | Mod: CPTII,S$GLB,, | Performed by: INTERNAL MEDICINE

## 2023-04-10 PROCEDURE — 1159F MED LIST DOCD IN RCRD: CPT | Mod: CPTII,S$GLB,, | Performed by: INTERNAL MEDICINE

## 2023-04-10 PROCEDURE — 3079F DIAST BP 80-89 MM HG: CPT | Mod: CPTII,S$GLB,, | Performed by: INTERNAL MEDICINE

## 2023-04-10 PROCEDURE — 1160F PR REVIEW ALL MEDS BY PRESCRIBER/CLIN PHARMACIST DOCUMENTED: ICD-10-PCS | Mod: CPTII,S$GLB,, | Performed by: INTERNAL MEDICINE

## 2023-04-10 PROCEDURE — 1160F RVW MEDS BY RX/DR IN RCRD: CPT | Mod: CPTII,S$GLB,, | Performed by: INTERNAL MEDICINE

## 2023-04-10 PROCEDURE — 3075F PR MOST RECENT SYSTOLIC BLOOD PRESS GE 130-139MM HG: ICD-10-PCS | Mod: CPTII,S$GLB,, | Performed by: INTERNAL MEDICINE

## 2023-04-10 PROCEDURE — 3079F PR MOST RECENT DIASTOLIC BLOOD PRESSURE 80-89 MM HG: ICD-10-PCS | Mod: CPTII,S$GLB,, | Performed by: INTERNAL MEDICINE

## 2023-04-10 PROCEDURE — 93000 EKG 12-LEAD: ICD-10-PCS | Mod: S$GLB,,, | Performed by: INTERNAL MEDICINE

## 2023-04-10 PROCEDURE — 1159F PR MEDICATION LIST DOCUMENTED IN MEDICAL RECORD: ICD-10-PCS | Mod: CPTII,S$GLB,, | Performed by: INTERNAL MEDICINE

## 2023-04-10 PROCEDURE — 3008F BODY MASS INDEX DOCD: CPT | Mod: CPTII,S$GLB,, | Performed by: INTERNAL MEDICINE

## 2023-04-10 PROCEDURE — 99999 PR PBB SHADOW E&M-EST. PATIENT-LVL III: ICD-10-PCS | Mod: PBBFAC,,, | Performed by: INTERNAL MEDICINE

## 2023-04-10 PROCEDURE — 3075F SYST BP GE 130 - 139MM HG: CPT | Mod: CPTII,S$GLB,, | Performed by: INTERNAL MEDICINE

## 2023-04-10 PROCEDURE — 99214 OFFICE O/P EST MOD 30 MIN: CPT | Mod: 25,S$GLB,, | Performed by: INTERNAL MEDICINE

## 2023-04-10 PROCEDURE — 93000 ELECTROCARDIOGRAM COMPLETE: CPT | Mod: S$GLB,,, | Performed by: INTERNAL MEDICINE

## 2023-04-10 NOTE — PROGRESS NOTES
Subjective:    Patient ID:  Rosanna Live is a 63 y.o. female who presents for evaluation of Follow-up      PCP/Referring: Geena Roman MD     HPI  Pt is a 62 yo F w/ PMH of Anxiety, Venous insufficiency followed by vascular, and tobacco abuse (quit 1983, 1.25 pk/yrs) who presents for f/u appt.  She was last seen 8/14/2020 and noted to have palpitations and atypical chest pain which was controlled and was continued on metoprolol.  Since this time she has been doing well however has been under a lot of stress due to illnesses of her mother and daughter.  She has palpitations and atypical cp which she attributes to her stress levels but states it is controlled w/ the metoprolol.  She denies sob, edema, orthopnea, PND, presyncope, LOC, or claudication.  She is compliant w/ meds and denies side effects.  She mentions that she has not been exercising however states she has been more active w/ walking.  She denies any limitations.  She does not monitor her BP at home.    Past Medical History:   Diagnosis Date    Allergy     Brito's esophagus     Basal cell cancer     Cataract     Diverticulosis     Edema     chronic right foot     Hiatal hernia 1/31/2017    IBS (irritable bowel syndrome)     Obesity     Rectovaginal fistula 6/28/2012    Vitamin D deficiency disease      Past Surgical History:   Procedure Laterality Date    CHOLECYSTECTOMY      COLONOSCOPY      COLONOSCOPY N/A 7/11/2022    Procedure: COLONOSCOPY;  Surgeon: Carlos Manuel Barber MD;  Location: [address]);  Service: Endoscopy;  Laterality: N/A;  golytely    ESOPHAGOGASTRODUODENOSCOPY      ESOPHAGOGASTRODUODENOSCOPY N/A 9/18/2019    Procedure: EGD (ESOPHAGOGASTRODUODENOSCOPY);  Surgeon: Carlos Manuel Barber MD;  Location: [address]);  Service: Endoscopy;  Laterality: N/A;  evaluate fundoplication    ESOPHAGOGASTRODUODENOSCOPY N/A 7/11/2022    Procedure: EGD (ESOPHAGOGASTRODUODENOSCOPY);  Surgeon: Carlos Manuel Barber MD;  Location:  [facility] (4TH FLR);  Service: Endoscopy;  Laterality: N/A;  Covid test  [facility], instructions sent to [email or web address]    INGUINAL HERNIA REPAIR      LAPAROSCOPIC NISSEN FUNDOPLICATION  2/15/16    Mohs' procedure of face      RECTO VAGINAL FISTULA  ,     UMBILICAL HERNIA REPAIR       Social History     Socioeconomic History    Marital status:    Occupational History    Occupation: Teacher     Employer: [employer]   Tobacco Use    Smoking status: Former     Packs/day: 0.25     Years: 5.00     Pack years: 1.25     Types: Cigarettes     Quit date: 1983     Years since quittin.6    Smokeless tobacco: Never   Substance and Sexual Activity    Alcohol use: Yes     Alcohol/week: 1.0 standard drink     Types: 1 Glasses of wine per week     Comment: Social    Drug use: No    Sexual activity: Never     Partners: Male     Birth control/protection: None, Abstinence   Social History Narrative         Social Determinants of Health     Financial Resource Strain: Low Risk     Difficulty of Paying Living Expenses: Not very hard   Food Insecurity: No Food Insecurity    Worried About Running Out of Food in the Last Year: Never true    Ran Out of Food in the Last Year: Never true   Transportation Needs: No Transportation Needs    Lack of Transportation (Medical): No    Lack of Transportation (Non-Medical): No   Physical Activity: Unknown    Days of Exercise per Week: 1 day   Stress: Stress Concern Present    Feeling of Stress : Rather much   Social Connections: Unknown    Frequency of Communication with Friends and Family: More than three times a week    Frequency of Social Gatherings with Friends and Family: Once a week    Active Member of Clubs or Organizations: No    Attends Club or Organization Meetings: 1 to 4 times per year    Marital Status:    Housing Stability: Low Risk     Unable to Pay for Housing in the Last Year: No    Number of Places Lived in the Last Year: 1    Unstable  Housing in the Last Year: No     Family History   Problem Relation Age of Onset    Arthritis Father         Rhematoid    Hyperlipidemia Father     Cataracts Father     Glaucoma Father     Heart disease Father     Miscarriages / Stillbirths Mother     Diabetes Mother     Hypertension Mother     Glaucoma Mother     Cataracts Mother     Heart disease Maternal Aunt     Heart disease Maternal Grandmother     No Known Problems Sister     No Known Problems Brother     No Known Problems Maternal Uncle     No Known Problems Paternal Aunt     No Known Problems Paternal Uncle     No Known Problems Maternal Grandfather     No Known Problems Paternal Grandmother     No Known Problems Paternal Grandfather     Breast cancer Neg Hx     Colon cancer Neg Hx     Ovarian cancer Neg Hx        Review of patient's allergies indicates:   Allergen Reactions    Penicillins Rash       Medication List with Changes/Refills   Current Medications    METOPROLOL TARTRATE (LOPRESSOR) 25 MG TABLET    TAKE 1/2 TABLET BY MOUTH 2 TIMES A DAY   Discontinued Medications    CROMOLYN (NASALCHROM) 5.2 MG/SPRAY (4 %) NASAL SPRAY    1 spray by Nasal route 4 (four) times daily.    FLUTICASONE PROPIONATE (FLONASE) 50 MCG/ACTUATION NASAL SPRAY    1 spray (50 mcg total) by Each Nostril route once daily.    PANTOPRAZOLE (PROTONIX) 40 MG TABLET    Take 1 tablet (40 mg total) by mouth before breakfast.       Review of Systems   Constitutional: Negative for diaphoresis and fever.   HENT:  Negative for congestion and hearing loss.    Eyes:  Negative for blurred vision and pain.   Cardiovascular:  Positive for chest pain and palpitations. Negative for claudication, dyspnea on exertion, leg swelling, near-syncope and syncope.   Respiratory:  Negative for shortness of breath and sleep disturbances due to breathing.    Hematologic/Lymphatic: Negative for bleeding problem. Does not bruise/bleed easily.   Skin:  Negative for color change and poor wound healing.  "  Gastrointestinal:  Negative for abdominal pain and nausea.   Genitourinary:  Negative for bladder incontinence and flank pain.   Neurological:  Negative for focal weakness and light-headedness.      Objective:   /80 (BP Location: Left arm, Patient Position: Sitting, BP Method: Large (Manual))   Pulse 63   Ht 5' 6" (1.676 m)   Wt 93.8 kg (206 lb 14.4 oz)   LMP  (LMP Unknown)   SpO2 96%   BMI 33.39 kg/m²    Physical Exam  Constitutional:       Appearance: She is well-developed. She is obese. She is not diaphoretic.   HENT:      Head: Normocephalic and atraumatic.   Eyes:      General: No scleral icterus.     Pupils: Pupils are equal, round, and reactive to light.   Neck:      Vascular: No JVD.   Cardiovascular:      Rate and Rhythm: Normal rate and regular rhythm.      Pulses: Intact distal pulses.      Heart sounds: S1 normal and S2 normal. No murmur heard.    No friction rub. No gallop.   Pulmonary:      Effort: Pulmonary effort is normal. No respiratory distress.      Breath sounds: Normal breath sounds. No wheezing or rales.   Chest:      Chest wall: No tenderness.   Abdominal:      General: Bowel sounds are normal. There is no distension.      Palpations: Abdomen is soft. There is no mass.      Tenderness: There is no abdominal tenderness. There is no rebound.   Musculoskeletal:         General: No tenderness. Normal range of motion.      Cervical back: Normal range of motion and neck supple.      Right lower leg: No edema.      Left lower leg: No edema.   Skin:     General: Skin is warm and dry.      Coloration: Skin is not pale.   Neurological:      Mental Status: She is alert and oriented to person, place, and time.      Coordination: Coordination normal.      Deep Tendon Reflexes: Reflexes normal.   Psychiatric:         Behavior: Behavior normal.         Judgment: Judgment normal.         Assessment:       1. Atrial tachycardia    2. Palpitations    3. Venous insufficiency    4. Atypical chest " pain    5. Class 1 obesity due to excess calories without serious comorbidity with body mass index (BMI) of 33.0 to 33.9 in adult         Plan:         Atrial tachycardia  Nonsustained per recent 24 hr Holter.  Pt also w/ PVCs and PACs.  Pt denies any further episodes of palpitations.  - continue metoprolol      Palpitations  Controlled.  Frequent episodes.  Echo WNL.    - continue metoprolol  - encouraged stress control    Venous insufficiency  Controlled.  Pt compliant w/ compression stockings and follows w/ vascular surgery.    - continue conservative measures for now      Atypical chest pain  Resolved.  Not worsened by exercise and pt w/ normal functional tolerance > 1 month ago.  Given age will eval for ischemia.  Pt w/ normal ECG.  ETT negative for ischemia, normal exercise tolerance.    - repeat ECG    Class 1 obesity due to excess calories without serious comorbidity with body mass index (BMI) of 33.0 to 33.9 in adult  Encouraged lifestyle modifications (diet, exercise, and weight loss).       Total duration of face to face visit time 30 minutes.  Total time spent counseling greater than fifty percent of total visit time.  Counseling included discussion regarding imaging findings, diagnosis, possibilities, treatment options, risks and benefits.  The patient had many questions regarding the options and long-term effects      Maurice Brooks M.D.  Interventional Cardiology

## 2023-04-10 NOTE — ASSESSMENT & PLAN NOTE
Resolved.  Not worsened by exercise and pt w/ normal functional tolerance > 1 month ago.  Given age will eval for ischemia.  Pt w/ normal ECG.  ETT negative for ischemia, normal exercise tolerance.    - repeat ECG

## 2023-06-23 ENCOUNTER — OFFICE VISIT (OUTPATIENT)
Dept: INTERNAL MEDICINE | Facility: CLINIC | Age: 63
End: 2023-06-23
Payer: COMMERCIAL

## 2023-06-23 VITALS
OXYGEN SATURATION: 97 % | HEART RATE: 68 BPM | SYSTOLIC BLOOD PRESSURE: 118 MMHG | WEIGHT: 208.75 LBS | HEIGHT: 66 IN | DIASTOLIC BLOOD PRESSURE: 66 MMHG | BODY MASS INDEX: 33.55 KG/M2

## 2023-06-23 DIAGNOSIS — I87.2 VENOUS INSUFFICIENCY: ICD-10-CM

## 2023-06-23 DIAGNOSIS — R00.2 PALPITATIONS: ICD-10-CM

## 2023-06-23 DIAGNOSIS — R09.81 NASAL CONGESTION: ICD-10-CM

## 2023-06-23 DIAGNOSIS — Z00.00 ANNUAL PHYSICAL EXAM: Primary | ICD-10-CM

## 2023-06-23 DIAGNOSIS — I83.90 VARICOSE VEINS OF LOWER EXTREMITY, UNSPECIFIED LATERALITY, UNSPECIFIED WHETHER COMPLICATED: ICD-10-CM

## 2023-06-23 DIAGNOSIS — J32.9 SINUSITIS, UNSPECIFIED CHRONICITY, UNSPECIFIED LOCATION: ICD-10-CM

## 2023-06-23 DIAGNOSIS — Z12.31 ENCOUNTER FOR SCREENING MAMMOGRAM FOR BREAST CANCER: ICD-10-CM

## 2023-06-23 PROCEDURE — 1160F RVW MEDS BY RX/DR IN RCRD: CPT | Mod: CPTII,S$GLB,, | Performed by: INTERNAL MEDICINE

## 2023-06-23 PROCEDURE — 99396 PR PREVENTIVE VISIT,EST,40-64: ICD-10-PCS | Mod: S$GLB,,, | Performed by: INTERNAL MEDICINE

## 2023-06-23 PROCEDURE — 99999 PR PBB SHADOW E&M-EST. PATIENT-LVL V: ICD-10-PCS | Mod: PBBFAC,,, | Performed by: INTERNAL MEDICINE

## 2023-06-23 PROCEDURE — 99999 PR PBB SHADOW E&M-EST. PATIENT-LVL V: CPT | Mod: PBBFAC,,, | Performed by: INTERNAL MEDICINE

## 2023-06-23 PROCEDURE — 1160F PR REVIEW ALL MEDS BY PRESCRIBER/CLIN PHARMACIST DOCUMENTED: ICD-10-PCS | Mod: CPTII,S$GLB,, | Performed by: INTERNAL MEDICINE

## 2023-06-23 PROCEDURE — 1159F MED LIST DOCD IN RCRD: CPT | Mod: CPTII,S$GLB,, | Performed by: INTERNAL MEDICINE

## 2023-06-23 PROCEDURE — 3078F PR MOST RECENT DIASTOLIC BLOOD PRESSURE < 80 MM HG: ICD-10-PCS | Mod: CPTII,S$GLB,, | Performed by: INTERNAL MEDICINE

## 2023-06-23 PROCEDURE — 99396 PREV VISIT EST AGE 40-64: CPT | Mod: S$GLB,,, | Performed by: INTERNAL MEDICINE

## 2023-06-23 PROCEDURE — 3074F PR MOST RECENT SYSTOLIC BLOOD PRESSURE < 130 MM HG: ICD-10-PCS | Mod: CPTII,S$GLB,, | Performed by: INTERNAL MEDICINE

## 2023-06-23 PROCEDURE — 1159F PR MEDICATION LIST DOCUMENTED IN MEDICAL RECORD: ICD-10-PCS | Mod: CPTII,S$GLB,, | Performed by: INTERNAL MEDICINE

## 2023-06-23 PROCEDURE — 3078F DIAST BP <80 MM HG: CPT | Mod: CPTII,S$GLB,, | Performed by: INTERNAL MEDICINE

## 2023-06-23 PROCEDURE — 3008F BODY MASS INDEX DOCD: CPT | Mod: CPTII,S$GLB,, | Performed by: INTERNAL MEDICINE

## 2023-06-23 PROCEDURE — 3008F PR BODY MASS INDEX (BMI) DOCUMENTED: ICD-10-PCS | Mod: CPTII,S$GLB,, | Performed by: INTERNAL MEDICINE

## 2023-06-23 PROCEDURE — 3074F SYST BP LT 130 MM HG: CPT | Mod: CPTII,S$GLB,, | Performed by: INTERNAL MEDICINE

## 2023-06-23 RX ORDER — AZITHROMYCIN 250 MG/1
TABLET, FILM COATED ORAL
Qty: 6 TABLET | Refills: 0 | Status: SHIPPED | OUTPATIENT
Start: 2023-06-23 | End: 2023-07-19

## 2023-06-23 RX ORDER — FLUTICASONE PROPIONATE 50 MCG
1 SPRAY, SUSPENSION (ML) NASAL DAILY PRN
COMMUNITY

## 2023-06-23 NOTE — PROGRESS NOTES
"Subjective:       Patient ID: Rosanna Live is a 63 y.o. female.    Chief Complaint: Annual Exam  This is a 63-year-old who presents today for follow-up.  Patient reports that she has been doing fairly well but under some increased situational stress as her daughter was recently diagnosed with amyloidosis and undergoing chemo treatments.  She has not been taking proton pump inhibitor as that tend to aggravate her stomach but she does try to watch her diet she is due for follow-up GI for additional scoping and will reach out to their office to schedule she has had no recent bleeding and no current diarrhea.  She has trouble at times with venous insufficiency she had previously seemed the vascular doctor may want to make a follow-up would like a referral placed  .  HPI  Review of Systems   Respiratory:  Negative for shortness of breath.    Cardiovascular:  Negative for chest pain.   Gastrointestinal:         Gi symptoms improved    Psychiatric/Behavioral:          Stressors daughter diagnosed amyloid      Objective:    Blood pressure 118/66, pulse 68, height 5' 6" (1.676 m), weight 94.7 kg (208 lb 12.4 oz), SpO2 97 %.   Physical Exam  Constitutional:       General: She is not in acute distress.  HENT:      Head: Normocephalic.      Comments: Congesion  Rhinitis      Mouth/Throat:      Pharynx: Oropharynx is clear.   Eyes:      General: No scleral icterus.  Cardiovascular:      Rate and Rhythm: Normal rate and regular rhythm.      Heart sounds: Normal heart sounds. No murmur heard.    No friction rub. No gallop.      Comments: Breast : normal no masses or tenderness      Pulmonary:      Effort: Pulmonary effort is normal. No respiratory distress.      Breath sounds: Normal breath sounds.   Abdominal:      General: Bowel sounds are normal.      Palpations: Abdomen is soft. There is no mass.      Tenderness: There is no abdominal tenderness.   Musculoskeletal:      Cervical back: Neck supple.      Comments: " Varocisoe veins  Stable right edema    Skin:     Findings: No erythema.   Neurological:      Mental Status: She is alert.   Psychiatric:         Mood and Affect: Mood normal.       Assessment:       1. Annual physical exam    2. Palpitations    3. Sinusitis, unspecified chronicity, unspecified location    4. Nasal congestion    5. Encounter for screening mammogram for breast cancer    6. Venous insufficiency    7. Varicose veins of lower extremity, unspecified laterality, unspecified whether complicated        Plan:       Rosanna Marshall was seen today for annual exam.    Diagnoses and all orders for this visit:    Annual physical exam  -     CBC Auto Differential; Future  -     Comprehensive Metabolic Panel; Future  -     Hemoglobin A1C; Future  -     Lipid Panel; Future  -     TSH; Future    Palpitations  History of asymptomatic she follows with Cardiology currently on metoprolol    Sinusitis, unspecified chronicity, unspecified location  Nasal congestion  Recurrent continue Flonase saline spray and antihistamine discussed such as Claritin with persistence in her thick drainage will re-treat Z-Anabell and a ENT referral was placed  -     Ambulatory referral/consult to ENT; Future    Encounter for screening mammogram for breast cancer  Schedule her annual when due  -     Mammo Digital Screening Bilat; Future    Venous insufficiency  -     Ambulatory referral/consult to Vascular Surgery; Future    Varicose veins of lower extremity, unspecified laterality, unspecified whether complicated  Referral placed for scheduling  -     Ambulatory referral/consult to Vascular Surgery; Future    Other orders  -     azithromycin (Z-ANABELL) 250 MG tablet; Take two tablets  First day then one tablet daily to complete course    Follow-up annually sooner if concerns she will call if she needs additional referrals for her daughter's diagnosis they have an appointment coming up soon

## 2023-06-27 ENCOUNTER — TELEPHONE (OUTPATIENT)
Dept: INTERNAL MEDICINE | Facility: CLINIC | Age: 63
End: 2023-06-27
Payer: COMMERCIAL

## 2023-07-14 DIAGNOSIS — M79.89 PAIN AND SWELLING OF LOWER EXTREMITY, UNSPECIFIED LATERALITY: Primary | ICD-10-CM

## 2023-07-14 DIAGNOSIS — M79.606 PAIN AND SWELLING OF LOWER EXTREMITY, UNSPECIFIED LATERALITY: Primary | ICD-10-CM

## 2023-07-19 ENCOUNTER — OFFICE VISIT (OUTPATIENT)
Dept: OTOLARYNGOLOGY | Facility: CLINIC | Age: 63
End: 2023-07-19
Payer: COMMERCIAL

## 2023-07-19 VITALS — WEIGHT: 212.06 LBS | BODY MASS INDEX: 34.23 KG/M2

## 2023-07-19 DIAGNOSIS — K21.00 GASTROESOPHAGEAL REFLUX DISEASE WITH ESOPHAGITIS WITHOUT HEMORRHAGE: ICD-10-CM

## 2023-07-19 DIAGNOSIS — R09.82 POSTNASAL DRIP: ICD-10-CM

## 2023-07-19 DIAGNOSIS — J34.2 DEVIATED NASAL SEPTUM: ICD-10-CM

## 2023-07-19 DIAGNOSIS — J31.0 CHRONIC RHINITIS: Primary | ICD-10-CM

## 2023-07-19 PROBLEM — R09.89 CHRONIC THROAT CLEARING: Status: RESOLVED | Noted: 2023-07-19 | Resolved: 2023-07-19

## 2023-07-19 PROBLEM — R09.89 CHRONIC THROAT CLEARING: Status: ACTIVE | Noted: 2023-07-19

## 2023-07-19 PROCEDURE — 99244 PR OFFICE CONSULTATION,LEVEL IV: ICD-10-PCS | Mod: 25,S$GLB,, | Performed by: OTOLARYNGOLOGY

## 2023-07-19 PROCEDURE — 1159F MED LIST DOCD IN RCRD: CPT | Mod: CPTII,S$GLB,, | Performed by: OTOLARYNGOLOGY

## 2023-07-19 PROCEDURE — 99244 OFF/OP CNSLTJ NEW/EST MOD 40: CPT | Mod: 25,S$GLB,, | Performed by: OTOLARYNGOLOGY

## 2023-07-19 PROCEDURE — 1160F PR REVIEW ALL MEDS BY PRESCRIBER/CLIN PHARMACIST DOCUMENTED: ICD-10-PCS | Mod: CPTII,S$GLB,, | Performed by: OTOLARYNGOLOGY

## 2023-07-19 PROCEDURE — 1159F PR MEDICATION LIST DOCUMENTED IN MEDICAL RECORD: ICD-10-PCS | Mod: CPTII,S$GLB,, | Performed by: OTOLARYNGOLOGY

## 2023-07-19 PROCEDURE — 99999 PR PBB SHADOW E&M-EST. PATIENT-LVL III: CPT | Mod: PBBFAC,,, | Performed by: OTOLARYNGOLOGY

## 2023-07-19 PROCEDURE — 1160F RVW MEDS BY RX/DR IN RCRD: CPT | Mod: CPTII,S$GLB,, | Performed by: OTOLARYNGOLOGY

## 2023-07-19 PROCEDURE — 31575 DIAGNOSTIC LARYNGOSCOPY: CPT | Mod: S$GLB,,, | Performed by: OTOLARYNGOLOGY

## 2023-07-19 PROCEDURE — 99999 PR PBB SHADOW E&M-EST. PATIENT-LVL III: ICD-10-PCS | Mod: PBBFAC,,, | Performed by: OTOLARYNGOLOGY

## 2023-07-19 PROCEDURE — 3008F PR BODY MASS INDEX (BMI) DOCUMENTED: ICD-10-PCS | Mod: CPTII,S$GLB,, | Performed by: OTOLARYNGOLOGY

## 2023-07-19 PROCEDURE — 31575 LARYNGOSCOPY: ICD-10-PCS | Mod: S$GLB,,, | Performed by: OTOLARYNGOLOGY

## 2023-07-19 PROCEDURE — 3044F HG A1C LEVEL LT 7.0%: CPT | Mod: CPTII,S$GLB,, | Performed by: OTOLARYNGOLOGY

## 2023-07-19 PROCEDURE — 3008F BODY MASS INDEX DOCD: CPT | Mod: CPTII,S$GLB,, | Performed by: OTOLARYNGOLOGY

## 2023-07-19 PROCEDURE — 3044F PR MOST RECENT HEMOGLOBIN A1C LEVEL <7.0%: ICD-10-PCS | Mod: CPTII,S$GLB,, | Performed by: OTOLARYNGOLOGY

## 2023-07-19 RX ORDER — FAMOTIDINE 20 MG/1
40 TABLET, FILM COATED ORAL NIGHTLY
Qty: 60 TABLET | Refills: 2 | Status: SHIPPED | OUTPATIENT
Start: 2023-07-19 | End: 2023-10-16

## 2023-07-19 NOTE — LETTER
2023    Geena Roman MD  [address] [place] [address]           OTOLARYNGOLOGY AND COMMUNICATION SCIENCES    Aris Daley MD, FACS          SURGICAL AND MEDICAL DISEASES OF HEAD AND NECK  MD Aris Ruiz MD, FACS  Ivan Clark III, MD    OTOLOGY, NEUROTOLOGY and SKULL-BASE SURGERY  Khai Corcoran MD, FACS  Dae Gonzalez MD, Director    PEDIATRIC OTOLARYNGOLOGY & OTOLOGY  JASON Sosa MD, [facility]  Lea Lynne MD, FACS    FACIAL PLASTIC and RECONSTRUCTIVE SURGERY  RICHARD Tovar III, MD, FACS    RHINOLOGY and SINUS SURGERY  Seun Salguero MD, MPH, FACS  Director   RICHARD Tovar III, MD, FACS    LARYNGOLOGY  Oren Martinez MD    SPEECH-LANGUAGE PATHOLOGY  Rosa Maria Gay, PhD, [facility]  Helga Lombardi, MS, CCC-SLP  Terra Oconnor, MS, CCC-SLP  Summer Redmond MA, [facility]-SLP    AUDIOLOGY SECTION  Donna Calero, MS, CCC-A  JOON Arreola, CCC-A  Amelia Kearney, CCC-A  Amelia Christine, CCC-A  Evin Mayes Jr., JOON, CCA-A  JOON Mansfield, CCC-A  Amelia Bullock, CCC-A    [facility]ANS  Head and Neck Surgical Oncology  ELKE Winn, FNP-C  Pedatric Otolaryngology  ELKE Bearden, PNP-C       Re:  Rosanna Live  :  1960    Dear Dr. Roman,    Thank you for referring your patient, Rosanna Live, to us for evaluation and treatment.  I have enclosed a copy of my clinic note for your review and records.  If you have any questions please do not hesitate to contact our office.     Thank you for allowing me to participate in the care of your patient.    Sincerely,        Seun Salguero MD, MPH, FACS    Director, Rhinology and Sinus Surgery  Department of Otorhinolaryngology  [facility]    Encl:  Progress note     [facility] [address] [place] [address]  phone [phone]   fax [phone]  [email or web address]

## 2023-07-19 NOTE — PROCEDURES
Laryngoscopy    Date/Time: 7/19/2023 4:00 PM  Performed by: Seun Salguero MD  Authorized by: Seun Salguero MD     Consent Done?:  Yes (Verbal)  Anesthesia:     Local anesthetic:  Lidocaine 4% and Aniceto-Synephrine 1/2%    Patient tolerance:  Patient tolerated the procedure well with no immediate complications  Laryngoscopy:     Areas examined:  Nasopharynx, oropharynx, hypopharynx, larynx, vocal cords and nasal cavities    Laryngoscope size:  4 mm  Nose Intranasal:      Mucosa no polyps     No mucosa lesions present     Septum gross deformity     Turbinates not enlarged  Nasopharynx:      No mucosa lesions     Adenoids not present     Posterior choanae patent     Eustachian tube patent  Larynx/hypopharynx:      No epiglottis lesions     No epiglottis edema     No AE folds lesions     No vocal cord polyps     Equal and normal bilateral     No hypopharynx lesions     No piriform sinus pooling     No piriform sinus lesions     No post cricoid edema     No post cricoid erythema     Prominent bony turbinates without soft tissue hypertrophy (post decongestion)  Biphasic septal deviation  Nonpurulent PND bilaterally mixed with saliva bubbles  No polyps  Larynx wnl

## 2023-07-19 NOTE — PROGRESS NOTES
Subjective:      Rosanna Live is a 63 y.o. female who was referred to me by Dr. Geena Cagle* in consultation for post-nasal drip.    She relates a 2-year history of chronic, daily, perennial throat clearing with copious mucus and postnasal drip.  She notes mild dysphagia but denies cough, hemoptysis or voice change.  She notes also a bilateral nasal congestion at night with associated snoring, though no blockage during the day.  She has an altered sense of smell and reduced gustation since nura covid 2 years ago.  She relates occasional right frontal headache which is sporadic.  She has had at least 2 sinus infections in the past year, most recently 5 weeks ago treated with a z-allan and a steroid shot.    Current sinonasal medications include flonase about once a week.  The last course of antibiotics was as above.  She does not regularly use nasal decongestant sprays.    She does not recall previously having allergy testing .    She denies a history of asthma.    She relates a history of reflux symptoms which was previously managed with omeprazole but she has stopped that due to side effects.  She relates a history of Brito's esophagus and frequent EGDs with esophageal dilation.  She also relates improved symptoms since a Nissen procedure several years ago.  She consciously limits her caffeine intake.    She denies a diagnosis of obstructive sleep apnea.     She has not had sinonasal surgery.  She has not had a tonsillectomy.    She does not recall a prior history of nasal trauma.        %         Past Medical History  She has a past medical history of Allergy, Brito's esophagus, Basal cell cancer, Cataract, Diverticulosis, Edema, Hiatal hernia, IBS (irritable bowel syndrome), Obesity, Rectovaginal fistula, and Vitamin D deficiency disease.    Past Surgical History  She has a past surgical history that includes Cholecystectomy; Mohs' procedure of face; Colonoscopy;  Esophagogastroduodenoscopy; Laparoscopic Nissen fundoplication (2/15/16); Inguinal hernia repair; Umbilical hernia repair; RECTO VAGINAL FISTULA (2011, 2012); Esophagogastroduodenoscopy (N/A, 9/18/2019); Esophagogastroduodenoscopy (N/A, 7/11/2022); and Colonoscopy (N/A, 7/11/2022).    Family History  Her family history includes Arthritis in her father; Cancer in an other family member; Cataracts in her father and mother; Diabetes in her mother; Glaucoma in her father and mother; Heart disease in her father, maternal aunt, and maternal grandmother; Hyperlipidemia in her father; Hypertension in her mother; Miscarriages / Stillbirths in her mother; No Known Problems in her brother, maternal grandfather, maternal uncle, paternal aunt, paternal grandfather, paternal grandmother, paternal uncle, and sister.    Social History  She reports that she quit smoking about 39 years ago. Her smoking use included cigarettes. She has a 1.25 pack-year smoking history. She has never used smokeless tobacco. She reports current alcohol use of about 1.0 standard drink per week. She reports that she does not use drugs.    Allergies  She is allergic to penicillins.    Medications   She has a current medication list which includes the following prescription(s): fluticasone propionate, metoprolol tartrate, and famotidine.    Review of Systems     Constitutional: Negative for appetite change, chills, fatigue, fever and unexpected weight loss.      HENT: Negative for ear discharge, ear infection, ear pain, facial swelling, hearing loss, mouth sores, nosebleeds, postnasal drip, ringing in the ears, runny nose, sinus infection, sinus pressure, sore throat, stuffy nose, tonsil infection, dental problems, trouble swallowing and voice change.      Eyes:  Negative for change in eyesight, eye drainage, eye itching and photophobia.     Respiratory:  Negative for cough, shortness of breath, sleep apnea, snoring and wheezing.      Cardiovascular:   Negative for chest pain, foot swelling, irregular heartbeat and swollen veins.     Gastrointestinal:  Negative for abdominal pain, acid reflux, constipation, diarrhea, heartburn and vomiting.     Genitourinary: Negative for difficulty urinating, sexual problems and frequent urination.     Musc: Negative for aching joints, aching muscles, back pain and neck pain.     Skin: Negative for rash.     Allergy: Negative for food allergies and seasonal allergies.     Endocrine: Negative for cold intolerance and heat intolerance.      Neurological: Negative for dizziness, headaches, light-headedness, seizures and tremors.      Hematologic: Negative for bruises/bleeds easily and swollen glands.      Psychiatric: Negative for decreased concentration, depression, nervous/anxious and sleep disturbance.             Objective:     Wt 96.2 kg (212 lb 1.3 oz)   LMP  (LMP Unknown)   BMI 34.23 kg/m²        Constitutional:   She appears well-developed. She is cooperative. Normal speech.  No hoarse voice.      Head:  Normocephalic. Salivary glands normal.  Facial strength is normal.      Ears:    Right Ear: No drainage or tenderness. Tympanic membrane is not perforated. Tympanic membrane mobility is normal. No middle ear effusion. No decreased hearing is noted.   Left Ear: No drainage or tenderness. Tympanic membrane is not perforated. Tympanic membrane mobility is normal.  No middle ear effusion. No decreased hearing is noted.     Nose:  Septal deviation present. No mucosal edema, rhinorrhea or polyps. No epistaxis. Turbinates normal, no turbinate masses and no turbinate hypertrophy.  Right sinus exhibits no maxillary sinus tenderness and no frontal sinus tenderness. Left sinus exhibits no maxillary sinus tenderness and no frontal sinus tenderness.     Mouth/Throat  Oropharynx clear and moist without lesions or asymmetry and normal uvula midline. She does not have dentures. Normal dentition. No oral lesions or mucous membrane lesions.  No oropharyngeal exudate or posterior oropharyngeal erythema. Tonsils present, +1.  Mirror exam not performed due to patient tolerance.  Mirror exam not performed due to patient tolerance.      Neck:  Neck normal without thyromegaly masses, asymmetry, normal tracheal structure, crepitus, and tenderness, thyroid normal, trachea normal and no adenopathy. Normal range of motion present.     She has no cervical adenopathy.     Cardiovascular:    Regular rhythm.              Pulmonary/Chest:   Effort normal.     Psychiatric:   She has a normal mood and affect. Her speech is normal and behavior is normal.     Neurological:   No cranial nerve deficit.     Skin:   No rash noted.     Procedure    Flexible laryngoscopy performed.  See procedure note.     Left nasal valve     Left MT     Left ET with PND     Right nasal valve     Right MT     Right PND     Right PND     Larynx wnl     Good mobility      Data Reviewed    WBC (K/uL)   Date Value   06/26/2023 8.93     Eosinophil % (%)   Date Value   06/26/2023 2.6     Eos # (K/uL)   Date Value   06/26/2023 0.2     Platelets (K/uL)   Date Value   06/26/2023 438     Glucose (mg/dL)   Date Value   06/26/2023 98     No results found for: IGE    No sinus imaging available.       Assessment:     1. Chronic rhinitis    2. Postnasal drip    3. Gastroesophageal reflux disease with esophagitis without hemorrhage    4. Deviated nasal septum         Plan:     I had a long discussion with the patient regarding her condition and the further workup and management options.  I reassured her as to the benign nature of today's findings, consistent with likely overlapping conditions of uncontrolled nonallergic rhinitis and extra-esophageal reflux.  I prescribed Pepcid 40 mg nightly for empiric treatment of her reflux.  We discussed the consistent use of flonase spray nightly before bed.    Follow up if symptoms worsen or fail to improve.

## 2023-07-25 ENCOUNTER — TELEPHONE (OUTPATIENT)
Dept: OBSTETRICS AND GYNECOLOGY | Facility: CLINIC | Age: 63
End: 2023-07-25
Payer: COMMERCIAL

## 2023-07-28 ENCOUNTER — HOSPITAL ENCOUNTER (OUTPATIENT)
Dept: VASCULAR SURGERY | Facility: CLINIC | Age: 63
Discharge: HOME | End: 2023-07-28
Attending: SURGERY
Payer: COMMERCIAL

## 2023-07-28 DIAGNOSIS — M79.606 PAIN AND SWELLING OF LOWER EXTREMITY, UNSPECIFIED LATERALITY: ICD-10-CM

## 2023-07-28 DIAGNOSIS — M79.89 PAIN AND SWELLING OF LOWER EXTREMITY, UNSPECIFIED LATERALITY: ICD-10-CM

## 2023-07-28 DIAGNOSIS — I87.2 VENOUS INSUFFICIENCY: Primary | ICD-10-CM

## 2023-07-28 PROCEDURE — 93970 EXTREMITY STUDY: CPT | Mod: S$GLB,,, | Performed by: SURGERY

## 2023-07-28 PROCEDURE — 93970 PR US DUPLEX, UPPER OR LOWER EXT VENOUS,COMPLETE BILAT: ICD-10-PCS | Mod: S$GLB,,, | Performed by: SURGERY

## 2023-08-02 ENCOUNTER — OFFICE VISIT (OUTPATIENT)
Dept: VASCULAR SURGERY | Facility: CLINIC | Age: 63
End: 2023-08-02
Payer: COMMERCIAL

## 2023-08-02 VITALS
BODY MASS INDEX: 34.08 KG/M2 | HEART RATE: 80 BPM | HEIGHT: 66 IN | DIASTOLIC BLOOD PRESSURE: 67 MMHG | SYSTOLIC BLOOD PRESSURE: 117 MMHG | WEIGHT: 212.06 LBS

## 2023-08-02 DIAGNOSIS — I83.90 VARICOSE VEINS OF LOWER EXTREMITY, UNSPECIFIED LATERALITY, UNSPECIFIED WHETHER COMPLICATED: ICD-10-CM

## 2023-08-02 DIAGNOSIS — I87.2 VENOUS INSUFFICIENCY: ICD-10-CM

## 2023-08-02 PROCEDURE — 3044F PR MOST RECENT HEMOGLOBIN A1C LEVEL <7.0%: ICD-10-PCS | Mod: CPTII,S$GLB,, | Performed by: SURGERY

## 2023-08-02 PROCEDURE — 3074F SYST BP LT 130 MM HG: CPT | Mod: CPTII,S$GLB,, | Performed by: SURGERY

## 2023-08-02 PROCEDURE — 3074F PR MOST RECENT SYSTOLIC BLOOD PRESSURE < 130 MM HG: ICD-10-PCS | Mod: CPTII,S$GLB,, | Performed by: SURGERY

## 2023-08-02 PROCEDURE — 3078F DIAST BP <80 MM HG: CPT | Mod: CPTII,S$GLB,, | Performed by: SURGERY

## 2023-08-02 PROCEDURE — 3078F PR MOST RECENT DIASTOLIC BLOOD PRESSURE < 80 MM HG: ICD-10-PCS | Mod: CPTII,S$GLB,, | Performed by: SURGERY

## 2023-08-02 PROCEDURE — 3044F HG A1C LEVEL LT 7.0%: CPT | Mod: CPTII,S$GLB,, | Performed by: SURGERY

## 2023-08-02 PROCEDURE — 99202 OFFICE O/P NEW SF 15 MIN: CPT | Mod: S$GLB,,, | Performed by: SURGERY

## 2023-08-02 PROCEDURE — 3008F BODY MASS INDEX DOCD: CPT | Mod: CPTII,S$GLB,, | Performed by: SURGERY

## 2023-08-02 PROCEDURE — 1159F PR MEDICATION LIST DOCUMENTED IN MEDICAL RECORD: ICD-10-PCS | Mod: CPTII,S$GLB,, | Performed by: SURGERY

## 2023-08-02 PROCEDURE — 1159F MED LIST DOCD IN RCRD: CPT | Mod: CPTII,S$GLB,, | Performed by: SURGERY

## 2023-08-02 PROCEDURE — 3008F PR BODY MASS INDEX (BMI) DOCUMENTED: ICD-10-PCS | Mod: CPTII,S$GLB,, | Performed by: SURGERY

## 2023-08-02 PROCEDURE — 99202 PR OFFICE/OUTPT VISIT, NEW, LEVL II, 15-29 MIN: ICD-10-PCS | Mod: S$GLB,,, | Performed by: SURGERY

## 2023-08-02 NOTE — PROGRESS NOTES
VASCULAR SURGERY CLINIC NOTE    Patient ID: Rosanna Live is a 63 y.o. female.    I. HISTORY     Chief Complaint: leg swelling    HPI: Rosanna Live is a 63 y.o. female who is here today for evaluation of right foot and ankle swelling.  Symptoms include swelling and intermittent pain. Symptoms began years ago.  Location is right lower extremity. Symptoms are worse at the end of the day. Patient is not wearing compression stockings daily. Patient has no history of DVT. History of venous interventions includes none.  No family history of venous disease.  Symptoms do limit patient's functional status and daily activities. Swelling improves but does not always resolve overnight. She has tried compression every once in a while in the past but otherwise no issues. She used to work as a  and spent a lot of time standing.    Migraine with aura: No  PFO/ASD/right to left shunt:  no    MI: no  Stroke: No  Seizure Disorder: No      Past Medical History:   Diagnosis Date    Allergy     Brito's esophagus     Basal cell cancer     Cataract     Diverticulosis     Edema     chronic right foot     Hiatal hernia 1/31/2017    IBS (irritable bowel syndrome)     Obesity     Rectovaginal fistula 6/28/2012    Vitamin D deficiency disease         Past Surgical History:   Procedure Laterality Date    CHOLECYSTECTOMY      COLONOSCOPY      COLONOSCOPY N/A 7/11/2022    Procedure: COLONOSCOPY;  Surgeon: Carlos Manuel Barber MD;  Location: [address]);  Service: Endoscopy;  Laterality: N/A;  golytely    ESOPHAGOGASTRODUODENOSCOPY      ESOPHAGOGASTRODUODENOSCOPY N/A 9/18/2019    Procedure: EGD (ESOPHAGOGASTRODUODENOSCOPY);  Surgeon: Carlos Manuel Barber MD;  Location: [address]);  Service: Endoscopy;  Laterality: N/A;  evaluate fundoplication    ESOPHAGOGASTRODUODENOSCOPY N/A 7/11/2022    Procedure: EGD (ESOPHAGOGASTRODUODENOSCOPY);  Surgeon: Carlos Manuel Barber MD;  Location: [address];   Service: Endoscopy;  Laterality: N/A;  Covid test  [facility]    INGUINAL HERNIA REPAIR      LAPAROSCOPIC NISSEN FUNDOPLICATION  2/15/16    Mohs' procedure of face      RECTO VAGINAL FISTULA  ,     UMBILICAL HERNIA REPAIR         Social History     Occupational History    Occupation: Teacher     Employer: [employer]   Tobacco Use    Smoking status: Former     Current packs/day: 0.00     Average packs/day: 0.3 packs/day for 5.0 years (1.3 ttl pk-yrs)     Types: Cigarettes     Start date: 1978     Quit date: 1983     Years since quittin.9    Smokeless tobacco: Never   Substance and Sexual Activity    Alcohol use: Yes     Alcohol/week: 1.0 standard drink of alcohol     Types: 1 Glasses of wine per week     Comment: Social    Drug use: No    Sexual activity: Never     Partners: Male     Birth control/protection: None, Abstinence         Current Outpatient Medications:     famotidine (PEPCID) 20 MG tablet, Take 2 tablets (40 mg total) by mouth every evening., Disp: 60 tablet, Rfl: 2    fluticasone propionate (FLONASE) 50 mcg/actuation nasal spray, 1 spray by Each Nostril route daily as needed., Disp: , Rfl:     metoprolol tartrate (LOPRESSOR) 25 MG tablet, TAKE 1/2 TABLET BY MOUTH 2 TIMES A DAY, Disp: 90 tablet, Rfl: 3    Review of Systems   Constitutional: Negative for weight loss.   HENT:  Negative for ear pain and nosebleeds.    Eyes:  Negative for discharge and pain.   Cardiovascular:  Negative for chest pain and palpitations.   Respiratory:  Negative for cough, shortness of breath and wheezing.    Endocrine: Negative for cold intolerance, heat intolerance and polyphagia.   Hematologic/Lymphatic: Negative for adenopathy. Does not bruise/bleed easily.   Skin:  Positive for rash. Negative for itching.   Musculoskeletal:  Positive for joint swelling. Negative for muscle cramps.   Gastrointestinal:  Negative for abdominal pain, diarrhea, nausea and  vomiting.   Genitourinary:  Negative for dysuria and flank pain.   Neurological:  Negative for numbness and seizures.         II. PHYSICAL EXAM     Physical Exam  Constitutional:       General: She is not in acute distress.     Appearance: Normal appearance. She is normal weight. She is not ill-appearing or diaphoretic.   HENT:      Head: Normocephalic and atraumatic.   Eyes:      General: No scleral icterus.        Right eye: No discharge.         Left eye: No discharge.      Extraocular Movements: Extraocular movements intact.      Conjunctiva/sclera: Conjunctivae normal.   Cardiovascular:      Rate and Rhythm: Normal rate and regular rhythm.      Pulses: Normal pulses.   Pulmonary:      Effort: Pulmonary effort is normal. No respiratory distress.   Musculoskeletal:         General: Normal range of motion.      Cervical back: Normal range of motion and neck supple.      Right lower leg: Edema present.      Left lower leg: Edema present.   Skin:     General: Skin is warm and dry.      Findings: Rash (fingers) present.   Neurological:      General: No focal deficit present.      Mental Status: She is alert and oriented to person, place, and time.   Psychiatric:         Mood and Affect: Mood normal.         Behavior: Behavior normal.         Reticular/Spider veins noted:  RLE: scattered  LLE: scattered    Varicose veins noted:  RLE: medial calf  LLE:  none    Imaging Results: (I have personally reviewed the images/studies and provided my interpretation below)  BLE Venous Duplex ultrasound 7/28/23 Impression:   Right Leg: Deep Venous system: no DVT, no reflux. GSV: + reflux. LSV: no reflux  Left Leg: Deep Venous system:  no DVT, + femoral and popliteal reflux. GSV: + reflux. LSV: no reflux    III. ASSESSMENT & PLAN (MEDICAL DECISION MAKING)     1. Venous insufficiency    2. Varicose veins of lower extremity, unspecified laterality, unspecified whether complicated        Venous Clinical Severity Score  Pain:3=Daily,  severe limiting activities or requiring regular use of analgesics  Varicose Veins: 0=None  Venous Edema: 0=None  Pigmentation: 0=None or focal, low intensity (tan)  Inflammation: 0=None  Induration: 0=None  Number of Active Ulcers: 0=0  Active Ulceration, Duration: 0=None  Active Ulcer Size: 0=None  Compressive Therapy: 3=Full compliance, stockings + elevation  Total Score: 6        Assessment/Diagnosis and Plan:  63 y.o. female here  for evaluation of right leg swelling. CEAP class 3. VCSS 6. Ultrasound of lower extremities reveals evidence of venous insufficiency in the right and left GSV.  Plan for conservative medical treatment at this time. The patient may benefit from right GSV ablation in the future if symptoms persist despite below.     -Continue compression with 20-30mmHg Rx stockings, elevation  -Exercise regularly  -RTC 3m for symptom re-evaluation      ELIF Cordero II, MD, [facility]  Vascular Surgery  [facility]  [phone]

## 2023-08-17 ENCOUNTER — PATIENT MESSAGE (OUTPATIENT)
Dept: ADMINISTRATIVE | Facility: HOSPITAL | Age: 63
End: 2023-08-17
Payer: COMMERCIAL

## 2023-08-17 ENCOUNTER — PATIENT OUTREACH (OUTPATIENT)
Dept: ADMINISTRATIVE | Facility: HOSPITAL | Age: 63
End: 2023-08-17
Payer: COMMERCIAL

## 2023-08-21 ENCOUNTER — TELEPHONE (OUTPATIENT)
Dept: ENDOSCOPY | Facility: HOSPITAL | Age: 63
End: 2023-08-21
Payer: COMMERCIAL

## 2023-08-22 NOTE — TELEPHONE ENCOUNTER
----- Message from Andrae Nino sent at 8/21/2023 12:12 PM CDT -----  Regarding: Appt requested  Contact: [phone]  Hi, pt called to request a follow up appt Per appt reminder latter. Pls call the pt at [phone] to help pt get scheduled.

## 2023-09-05 NOTE — PROGRESS NOTES
HISTORY OF PRESENT ILLNESS:    Rosanna Live is a 63 y.o. female , presents for a routine exam.  Denies GYN complaints. She desnies vaginal bleeding, vasomotor symptoms, vaginal dryness.  She does not want STD screening.    The patient participates in regular exercise: no.  The patient does not smoke.  The patient wears seatbelts.   Pt denies any domestic violence.  Denies tattoos or blood transfusions.     SCREENING HISTORY:  PAP:  NILM - hpv 2019 pap nilm \\  nilm \\  nilm   MAMMOGRAM:  wnl    TC:  6.64  COLONOSCOPY:  - follow up in [facility]    COVID VACCINE: dose 2 due   Dexa: a few years ago     Gyn FH:  Breast cancer: none  Colon cancer: none  Ovarian cancer: none  Endometrial cancer: none    LAST   Rosanna Live is a 62 y.o. female , presents for a routine exam.  No GYN complaints. She DENIES vaginal bleeding, vasomotor symptoms, vaginal dryness.  She does not want STD screening.    The patient participates in regular exercise: NO.  The patient does not smoke.  The patient wears seatbelts.   Pt denies any domestic violence.  NO-  tattoos or blood transfusions.   Last   COTEST PLANNED  AND MAMMO ORDERED.  NO GYN PROBLEMS.  DISCUSSED HER AND HER DAUGHTER GETTING THE COVID VACCINE  :   MAMMO ORDER AND NL PAP .  NO GYN C/O.  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 [email or web address], MAKING PLANS FOR FALL       Past Medical History:   Diagnosis Date    Allergy     Brito's esophagus     Basal cell cancer     Cataract     Diverticulosis     Edema     chronic right foot     Hiatal hernia 2017    IBS (irritable bowel syndrome)     Obesity     Rectovaginal fistula 2012    Vitamin D deficiency disease        Past Surgical History:   Procedure Laterality Date    CHOLECYSTECTOMY      COLONOSCOPY      COLONOSCOPY N/A 2022     Procedure: COLONOSCOPY;  Surgeon: Carlos Manuel Barber MD;  Location: [facility] ENDO (4TH FLR);  Service: Endoscopy;  Laterality: N/A;  golytely    ESOPHAGOGASTRODUODENOSCOPY      ESOPHAGOGASTRODUODENOSCOPY N/A 9/18/2019    Procedure: EGD (ESOPHAGOGASTRODUODENOSCOPY);  Surgeon: Carlos Manuel Barber MD;  Location: [facility] ENDO (4TH FLR);  Service: Endoscopy;  Laterality: N/A;  evaluate fundoplication    ESOPHAGOGASTRODUODENOSCOPY N/A 7/11/2022    Procedure: EGD (ESOPHAGOGASTRODUODENOSCOPY);  Surgeon: Carlos Manuel Barber MD;  Location: [facility] ENDO (4TH FLR);  Service: Endoscopy;  Laterality: N/A;  [place]8 [facility] [place] [email or web address]    INGUINAL HERNIA REPAIR      LAPAROSCOPIC NISSEN FUNDOPLICATION  2/15/16    Mohs' procedure of face      RECTO VAGINAL FISTULA  2011, 2012    UMBILICAL HERNIA REPAIR          MEDICATIONS AND ALLERGIES:      Current Outpatient Medications:     famotidine (PEPCID) 20 MG tablet, Take 2 tablets (40 mg total) by mouth every evening., Disp: 60 tablet, Rfl: 2    fluticasone propionate (FLONASE) 50 mcg/actuation nasal spray, 1 spray by Each Nostril route daily as needed., Disp: , Rfl:     metoprolol tartrate (LOPRESSOR) 25 MG tablet, TAKE 1/2 TABLET BY MOUTH 2 TIMES A DAY, Disp: 90 tablet, Rfl: 3    Review of patient's allergies indicates:   Allergen Reactions    Penicillins Rash       Family History   Problem Relation Age of Onset    Miscarriages / Stillbirths Mother     Diabetes Mother     Hypertension Mother     Glaucoma Mother     Cataracts Mother     Arthritis Father         Rhematoid    Hyperlipidemia Father     Cataracts Father     Glaucoma Father     Heart disease Father     No Known Problems Sister     No Known Problems Brother     Heart disease Maternal Grandmother     No Known Problems Maternal Grandfather     No Known Problems Paternal Grandmother     No Known Problems Paternal Grandfather     Heart disease Maternal Aunt     No Known Problems Maternal Uncle     No Known Problems  Paternal Aunt     No Known Problems Paternal Uncle     Cancer Daughter         amyloidosis    No Known Problems Son     Breast cancer Neg Hx     Colon cancer Neg Hx     Ovarian cancer Neg Hx        Social History     Socioeconomic History    Marital status:    Occupational History    Occupation: Teacher     Employer: [employer]   Tobacco Use    Smoking status: Former     Current packs/day: 0.00     Average packs/day: 0.3 packs/day for 5.0 years (1.3 ttl pk-yrs)     Types: Cigarettes     Start date: 1978     Quit date: 1983     Years since quittin.0    Smokeless tobacco: Never   Substance and Sexual Activity    Alcohol use: Yes     Alcohol/week: 1.0 standard drink of alcohol     Types: 1 Glasses of wine per week     Comment: Social    Drug use: No    Sexual activity: Never     Partners: Male     Birth control/protection: None, Abstinence   Social History Narrative         Social Determinants of Health     Financial Resource Strain: Low Risk  (4/10/2023)    Overall Financial Resource Strain (CARDIA)     Difficulty of Paying Living Expenses: Not very hard   Food Insecurity: No Food Insecurity (4/10/2023)    Hunger Vital Sign     Worried About Running Out of Food in the Last Year: Never true     Ran Out of Food in the Last Year: Never true   Transportation Needs: No Transportation Needs (4/10/2023)    PRAPARE - Transportation     Lack of Transportation (Medical): No     Lack of Transportation (Non-Medical): No   Physical Activity: Unknown (4/10/2023)    Exercise Vital Sign     Days of Exercise per Week: 1 day   Stress: Stress Concern Present (4/10/2023)    [place] [employer] - Occupational Stress Questionnaire     Feeling of Stress : Rather much   Social Connections: Unknown (4/10/2023)    Social Connection and Isolation Panel [NHANES]     Frequency of Communication with Friends and Family: More than three times a week     Frequency of Social Gatherings  with Friends and Family: Once a week     Active Member of Clubs or Organizations: No     Attends Club or Organization Meetings: 1 to 4 times per year     Marital Status:    Housing Stability: Low Risk  (4/10/2023)    Housing Stability Vital Sign     Unable to Pay for Housing in the Last Year: No     Number of Places Lived in the Last Year: 1     Unstable Housing in the Last Year: No       COMPREHENSIVE GYN HISTORY:    PAP History:  Denies abnormal Paps e  Infection History: Denies STDs. Denies PID.  Benign History: Denies uterine fibroids. Denies ovarian cysts. Denies endometriosis. Denies other conditions.  Cancer History: Denies cervical cancer. Denies uterine cancer or hyperplasia. Denies ovarian cancer. Denies vulvar cancer or pre-cancer. Denies vaginal cancer or pre-cancer. Denies breast cancer. Denies colon cancer.    ROS:  GENERAL: No weight changes. No swelling. No fatigue. No fever.  CARDIOVASCULAR: No chest pain. No shortness of breath. No leg cramps.   NEUROLOGICAL: No headaches. No vision changes.  BREASTS: No pain. No lumps. No discharge.  ABDOMEN: No pain. No nausea. No vomiting. No diarrhea. No constipation.  REPRODUCTIVE: No  abnormal bleeding.   VULVA: No pain. No lesions. No itching.   VAGINA: No relaxation. No itching. No odor. No discharge. No lesions.   URINARY: No incontinence. No nocturia. No frequency. No dysuria.    /64   Wt 96.2 kg (212 lb 3.1 oz)   LMP  (LMP Unknown)   BMI 34.25 kg/m²     PE:  APPEARANCE: Well nourished, well developed, in no acute distress.  AFFECT: WNL, alert and oriented x 3.  SKIN: No hirsutism or acne.  NECK: Neck symmetric without masses or thyromegaly.  NODES: No inguinal, cervical, axillary or femoral lymph node enlargement.  CHEST: Good respiratory effort.   ABDOMEN: Soft. No tenderness or masses. No hepatosplenomegaly. No hernias.  BREASTS: Symmetrical, no skin changes or visible lesions. No palpable masses, nipple discharge bilaterally.  PELVIC:  ATROPHIC EXTERNAL FEMALE GENITALIA without lesions. Normal hair distribution. Adequate perineal body, normal urethral meatus. VAGINA DRY without lesions or discharge. CERVIX STENOTIC without lesions, discharge or tenderness. No significant cystocele or rectocele. Bimanual exam shows uterus to be normal size, regular, mobile and nontender. Adnexa without masses or tenderness.  EXTREMITIES: No edema.    DIAGNOSIS:  1. Women's annual routine gynecological examination        2. Screening breast examination        3. Menopausal state              PLAN:  - Pap due in 2 years.  - Screening tests as ordered.  - Diet and exercise encouraged.  Seat belt use encouraged.  Calcium and vitamin D recommended.     Counseling: Healthy life style choices including diet and exercise, 1200 mg calcium and 600 IU until age 71 then 800 IU vitamin D supplementation daily, healthy sexual decision making, development of healthy and safe relationships.    Counseling on osteoporosis prevention, calcium supplementation, and regular weight bearing exercise listed in AVS.     The patient was also counseled today on ACS PAP guidelines, with recommendations for yearly pelvic exams unless their uterus, cervix, and ovaries were removed for benign reasons; in that case, examinations every 3-5 years are recommended.  The patient was also counseled regarding monthly breast self-examination, routine STD screening for at-risk populations, prophylactic immunizations for transmitted infections such as  HPV, Pertussis, or Influenza as appropriate, and yearly mammograms when indicated by ACS guidelines.  She was advised to see her primary care physician for all other health maintenance.    FOLLOW-UP with me annually.   Zoraida Agustin PA-C

## 2023-09-06 ENCOUNTER — OFFICE VISIT (OUTPATIENT)
Dept: OBSTETRICS AND GYNECOLOGY | Facility: CLINIC | Age: 63
End: 2023-09-06
Payer: COMMERCIAL

## 2023-09-06 ENCOUNTER — PATIENT OUTREACH (OUTPATIENT)
Dept: ADMINISTRATIVE | Facility: HOSPITAL | Age: 63
End: 2023-09-06
Payer: COMMERCIAL

## 2023-09-06 VITALS
DIASTOLIC BLOOD PRESSURE: 64 MMHG | SYSTOLIC BLOOD PRESSURE: 128 MMHG | WEIGHT: 212.19 LBS | BODY MASS INDEX: 34.25 KG/M2

## 2023-09-06 DIAGNOSIS — Z12.39 SCREENING BREAST EXAMINATION: ICD-10-CM

## 2023-09-06 DIAGNOSIS — Z01.419 WOMEN'S ANNUAL ROUTINE GYNECOLOGICAL EXAMINATION: Primary | ICD-10-CM

## 2023-09-06 DIAGNOSIS — N95.1 MENOPAUSAL STATE: ICD-10-CM

## 2023-09-06 PROCEDURE — 3074F SYST BP LT 130 MM HG: CPT | Mod: CPTII,S$GLB,, | Performed by: PHYSICIAN ASSISTANT

## 2023-09-06 PROCEDURE — 99999 PR PBB SHADOW E&M-EST. PATIENT-LVL II: CPT | Mod: PBBFAC,,, | Performed by: PHYSICIAN ASSISTANT

## 2023-09-06 PROCEDURE — 99999 PR PBB SHADOW E&M-EST. PATIENT-LVL II: ICD-10-PCS | Mod: PBBFAC,,, | Performed by: PHYSICIAN ASSISTANT

## 2023-09-06 PROCEDURE — 3008F PR BODY MASS INDEX (BMI) DOCUMENTED: ICD-10-PCS | Mod: CPTII,S$GLB,, | Performed by: PHYSICIAN ASSISTANT

## 2023-09-06 PROCEDURE — 1159F PR MEDICATION LIST DOCUMENTED IN MEDICAL RECORD: ICD-10-PCS | Mod: CPTII,S$GLB,, | Performed by: PHYSICIAN ASSISTANT

## 2023-09-06 PROCEDURE — 99396 PR PREVENTIVE VISIT,EST,40-64: ICD-10-PCS | Mod: S$GLB,,, | Performed by: PHYSICIAN ASSISTANT

## 2023-09-06 PROCEDURE — 99396 PREV VISIT EST AGE 40-64: CPT | Mod: S$GLB,,, | Performed by: PHYSICIAN ASSISTANT

## 2023-09-06 PROCEDURE — 3074F PR MOST RECENT SYSTOLIC BLOOD PRESSURE < 130 MM HG: ICD-10-PCS | Mod: CPTII,S$GLB,, | Performed by: PHYSICIAN ASSISTANT

## 2023-09-06 PROCEDURE — 3078F DIAST BP <80 MM HG: CPT | Mod: CPTII,S$GLB,, | Performed by: PHYSICIAN ASSISTANT

## 2023-09-06 PROCEDURE — 3008F BODY MASS INDEX DOCD: CPT | Mod: CPTII,S$GLB,, | Performed by: PHYSICIAN ASSISTANT

## 2023-09-06 PROCEDURE — 3078F PR MOST RECENT DIASTOLIC BLOOD PRESSURE < 80 MM HG: ICD-10-PCS | Mod: CPTII,S$GLB,, | Performed by: PHYSICIAN ASSISTANT

## 2023-09-06 PROCEDURE — 1159F MED LIST DOCD IN RCRD: CPT | Mod: CPTII,S$GLB,, | Performed by: PHYSICIAN ASSISTANT

## 2023-09-06 PROCEDURE — 3044F PR MOST RECENT HEMOGLOBIN A1C LEVEL <7.0%: ICD-10-PCS | Mod: CPTII,S$GLB,, | Performed by: PHYSICIAN ASSISTANT

## 2023-09-06 PROCEDURE — 3044F HG A1C LEVEL LT 7.0%: CPT | Mod: CPTII,S$GLB,, | Performed by: PHYSICIAN ASSISTANT

## 2023-09-06 NOTE — PATIENT INSTRUCTIONS
Recommend healthy life style choices including diet and exercise, calcium and vitamin D supplementation daily, healthy sexual decision making, development of healthy and safe relationships.    Calcium and Vitamin D Recommendation:   Persons nine to 18 years of age: 1,300 mg of calcium, 600 IU of vitamin D  Persons 19 to 50 years of age: 1,000 mg of calcium, 600 IU of vitamin D  Persons 51 to 70 years of age: 1,200 mg of calcium, 600 IU of vitamin D  Persons 71 years and older: 1,200 mg of calcium, 800 IU of vitamin D    I recommend a daily vitamin.  I also recommend consistent condom use or some form of back up contraception unless you would like to start a form of birth control.

## 2023-10-16 DIAGNOSIS — K21.00 GASTROESOPHAGEAL REFLUX DISEASE WITH ESOPHAGITIS WITHOUT HEMORRHAGE: ICD-10-CM

## 2023-10-16 RX ORDER — FAMOTIDINE 20 MG/1
TABLET, FILM COATED ORAL
Qty: 60 TABLET | Refills: 2 | Status: SHIPPED | OUTPATIENT
Start: 2023-10-16 | End: 2024-01-22

## 2023-12-20 ENCOUNTER — TELEPHONE (OUTPATIENT)
Dept: ENDOSCOPY | Facility: HOSPITAL | Age: 63
End: 2023-12-20
Payer: COMMERCIAL

## 2023-12-20 ENCOUNTER — PATIENT MESSAGE (OUTPATIENT)
Dept: GASTROENTEROLOGY | Facility: CLINIC | Age: 63
End: 2023-12-20

## 2023-12-20 ENCOUNTER — OFFICE VISIT (OUTPATIENT)
Dept: GASTROENTEROLOGY | Facility: CLINIC | Age: 63
End: 2023-12-20
Payer: COMMERCIAL

## 2023-12-20 ENCOUNTER — LAB VISIT (OUTPATIENT)
Dept: LAB | Facility: HOSPITAL | Age: 63
End: 2023-12-20
Attending: INTERNAL MEDICINE
Payer: COMMERCIAL

## 2023-12-20 VITALS
SYSTOLIC BLOOD PRESSURE: 141 MMHG | HEART RATE: 75 BPM | DIASTOLIC BLOOD PRESSURE: 85 MMHG | HEIGHT: 66 IN | WEIGHT: 212.06 LBS | BODY MASS INDEX: 34.08 KG/M2

## 2023-12-20 VITALS — WEIGHT: 211 LBS | HEIGHT: 66 IN | BODY MASS INDEX: 33.91 KG/M2

## 2023-12-20 DIAGNOSIS — Z51.81 ENCOUNTER FOR MONITORING LONG-TERM PROTON PUMP INHIBITOR THERAPY: Primary | ICD-10-CM

## 2023-12-20 DIAGNOSIS — K22.70 BARRETT'S ESOPHAGUS WITHOUT DYSPLASIA: ICD-10-CM

## 2023-12-20 DIAGNOSIS — Z12.11 COLON CANCER SCREENING: ICD-10-CM

## 2023-12-20 DIAGNOSIS — Z79.899 ENCOUNTER FOR MONITORING LONG-TERM PROTON PUMP INHIBITOR THERAPY: Primary | ICD-10-CM

## 2023-12-20 DIAGNOSIS — K55.9 ISCHEMIC COLITIS: ICD-10-CM

## 2023-12-20 DIAGNOSIS — K22.70 BARRETT'S ESOPHAGUS WITHOUT DYSPLASIA: Primary | ICD-10-CM

## 2023-12-20 DIAGNOSIS — Z12.11 COLON CANCER SCREENING: Primary | ICD-10-CM

## 2023-12-20 DIAGNOSIS — E66.9 CLASS 1 OBESITY WITHOUT SERIOUS COMORBIDITY WITH BODY MASS INDEX (BMI) OF 34.0 TO 34.9 IN ADULT, UNSPECIFIED OBESITY TYPE: ICD-10-CM

## 2023-12-20 DIAGNOSIS — Z51.81 ENCOUNTER FOR MONITORING LONG-TERM PROTON PUMP INHIBITOR THERAPY: ICD-10-CM

## 2023-12-20 DIAGNOSIS — K51.919 ULCERATIVE COLITIS WITH COMPLICATION, UNSPECIFIED LOCATION: ICD-10-CM

## 2023-12-20 DIAGNOSIS — Z79.899 ENCOUNTER FOR MONITORING LONG-TERM PROTON PUMP INHIBITOR THERAPY: ICD-10-CM

## 2023-12-20 DIAGNOSIS — K21.9 GASTROESOPHAGEAL REFLUX DISEASE, UNSPECIFIED WHETHER ESOPHAGITIS PRESENT: ICD-10-CM

## 2023-12-20 LAB
25(OH)D3+25(OH)D2 SERPL-MCNC: 32 NG/ML (ref 30–96)
ALBUMIN SERPL BCP-MCNC: 3.7 G/DL (ref 3.5–5.2)
ALP SERPL-CCNC: 95 U/L (ref 55–135)
ALT SERPL W/O P-5'-P-CCNC: 17 U/L (ref 10–44)
ANION GAP SERPL CALC-SCNC: 9 MMOL/L (ref 8–16)
AST SERPL-CCNC: 17 U/L (ref 10–40)
BASOPHILS # BLD AUTO: 0.08 K/UL (ref 0–0.2)
BASOPHILS NFR BLD: 0.9 % (ref 0–1.9)
BILIRUB SERPL-MCNC: 0.4 MG/DL (ref 0.1–1)
BUN SERPL-MCNC: 14 MG/DL (ref 8–23)
CALCIUM SERPL-MCNC: 9.7 MG/DL (ref 8.7–10.5)
CHLORIDE SERPL-SCNC: 106 MMOL/L (ref 95–110)
CO2 SERPL-SCNC: 27 MMOL/L (ref 23–29)
CREAT SERPL-MCNC: 0.7 MG/DL (ref 0.5–1.4)
DIFFERENTIAL METHOD: ABNORMAL
EOSINOPHIL # BLD AUTO: 0.2 K/UL (ref 0–0.5)
EOSINOPHIL NFR BLD: 2.5 % (ref 0–8)
ERYTHROCYTE [DISTWIDTH] IN BLOOD BY AUTOMATED COUNT: 12.4 % (ref 11.5–14.5)
EST. GFR  (NO RACE VARIABLE): >60 ML/MIN/1.73 M^2
FERRITIN SERPL-MCNC: 95 NG/ML (ref 20–300)
GLUCOSE SERPL-MCNC: 98 MG/DL (ref 70–110)
HCT VFR BLD AUTO: 39.8 % (ref 37–48.5)
HGB BLD-MCNC: 13.6 G/DL (ref 12–16)
IMM GRANULOCYTES # BLD AUTO: 0.05 K/UL (ref 0–0.04)
IMM GRANULOCYTES NFR BLD AUTO: 0.6 % (ref 0–0.5)
IRON SERPL-MCNC: 73 UG/DL (ref 30–160)
LIPASE SERPL-CCNC: 25 U/L (ref 4–60)
LYMPHOCYTES # BLD AUTO: 2.1 K/UL (ref 1–4.8)
LYMPHOCYTES NFR BLD: 23.2 % (ref 18–48)
MCH RBC QN AUTO: 28.8 PG (ref 27–31)
MCHC RBC AUTO-ENTMCNC: 34.2 G/DL (ref 32–36)
MCV RBC AUTO: 84 FL (ref 82–98)
MONOCYTES # BLD AUTO: 0.9 K/UL (ref 0.3–1)
MONOCYTES NFR BLD: 10.1 % (ref 4–15)
NEUTROPHILS # BLD AUTO: 5.6 K/UL (ref 1.8–7.7)
NEUTROPHILS NFR BLD: 62.7 % (ref 38–73)
NRBC BLD-RTO: 0 /100 WBC
PLATELET # BLD AUTO: 419 K/UL (ref 150–450)
PMV BLD AUTO: 8.7 FL (ref 9.2–12.9)
POTASSIUM SERPL-SCNC: 4.3 MMOL/L (ref 3.5–5.1)
PROT SERPL-MCNC: 7.4 G/DL (ref 6–8.4)
RBC # BLD AUTO: 4.72 M/UL (ref 4–5.4)
SATURATED IRON: 19 % (ref 20–50)
SODIUM SERPL-SCNC: 142 MMOL/L (ref 136–145)
TOTAL IRON BINDING CAPACITY: 388 UG/DL (ref 250–450)
TRANSFERRIN SERPL-MCNC: 262 MG/DL (ref 200–375)
VIT B12 SERPL-MCNC: 416 PG/ML (ref 210–950)
WBC # BLD AUTO: 8.89 K/UL (ref 3.9–12.7)

## 2023-12-20 PROCEDURE — 3079F DIAST BP 80-89 MM HG: CPT | Mod: CPTII,S$GLB,, | Performed by: INTERNAL MEDICINE

## 2023-12-20 PROCEDURE — 3077F PR MOST RECENT SYSTOLIC BLOOD PRESSURE >= 140 MM HG: ICD-10-PCS | Mod: CPTII,S$GLB,, | Performed by: INTERNAL MEDICINE

## 2023-12-20 PROCEDURE — 3077F SYST BP >= 140 MM HG: CPT | Mod: CPTII,S$GLB,, | Performed by: INTERNAL MEDICINE

## 2023-12-20 PROCEDURE — 99214 OFFICE O/P EST MOD 30 MIN: CPT | Mod: S$GLB,,, | Performed by: INTERNAL MEDICINE

## 2023-12-20 PROCEDURE — 3079F PR MOST RECENT DIASTOLIC BLOOD PRESSURE 80-89 MM HG: ICD-10-PCS | Mod: CPTII,S$GLB,, | Performed by: INTERNAL MEDICINE

## 2023-12-20 PROCEDURE — 83540 ASSAY OF IRON: CPT | Performed by: INTERNAL MEDICINE

## 2023-12-20 PROCEDURE — 84466 ASSAY OF TRANSFERRIN: CPT | Performed by: INTERNAL MEDICINE

## 2023-12-20 PROCEDURE — 99999 PR PBB SHADOW E&M-EST. PATIENT-LVL III: CPT | Mod: PBBFAC,,, | Performed by: INTERNAL MEDICINE

## 2023-12-20 PROCEDURE — 83690 ASSAY OF LIPASE: CPT | Performed by: INTERNAL MEDICINE

## 2023-12-20 PROCEDURE — 3044F PR MOST RECENT HEMOGLOBIN A1C LEVEL <7.0%: ICD-10-PCS | Mod: CPTII,S$GLB,, | Performed by: INTERNAL MEDICINE

## 2023-12-20 PROCEDURE — 99999 PR PBB SHADOW E&M-EST. PATIENT-LVL III: ICD-10-PCS | Mod: PBBFAC,,, | Performed by: INTERNAL MEDICINE

## 2023-12-20 PROCEDURE — 99214 PR OFFICE/OUTPT VISIT, EST, LEVL IV, 30-39 MIN: ICD-10-PCS | Mod: S$GLB,,, | Performed by: INTERNAL MEDICINE

## 2023-12-20 PROCEDURE — 80053 COMPREHEN METABOLIC PANEL: CPT | Performed by: INTERNAL MEDICINE

## 2023-12-20 PROCEDURE — 85025 COMPLETE CBC W/AUTO DIFF WBC: CPT | Performed by: INTERNAL MEDICINE

## 2023-12-20 PROCEDURE — 36415 COLL VENOUS BLD VENIPUNCTURE: CPT | Performed by: INTERNAL MEDICINE

## 2023-12-20 PROCEDURE — 82728 ASSAY OF FERRITIN: CPT | Performed by: INTERNAL MEDICINE

## 2023-12-20 PROCEDURE — 3044F HG A1C LEVEL LT 7.0%: CPT | Mod: CPTII,S$GLB,, | Performed by: INTERNAL MEDICINE

## 2023-12-20 PROCEDURE — 3008F PR BODY MASS INDEX (BMI) DOCUMENTED: ICD-10-PCS | Mod: CPTII,S$GLB,, | Performed by: INTERNAL MEDICINE

## 2023-12-20 PROCEDURE — 3008F BODY MASS INDEX DOCD: CPT | Mod: CPTII,S$GLB,, | Performed by: INTERNAL MEDICINE

## 2023-12-20 PROCEDURE — 82306 VITAMIN D 25 HYDROXY: CPT | Performed by: INTERNAL MEDICINE

## 2023-12-20 PROCEDURE — 82607 VITAMIN B-12: CPT | Performed by: INTERNAL MEDICINE

## 2023-12-20 RX ORDER — PANTOPRAZOLE SODIUM 20 MG/1
20 TABLET, DELAYED RELEASE ORAL
Qty: 90 TABLET | Refills: 3 | Status: SHIPPED | OUTPATIENT
Start: 2023-12-20 | End: 2023-12-20

## 2023-12-20 RX ORDER — SODIUM, POTASSIUM,MAG SULFATES 17.5-3.13G
1 SOLUTION, RECONSTITUTED, ORAL ORAL DAILY
Qty: 1 KIT | Refills: 0 | Status: SHIPPED | OUTPATIENT
Start: 2023-12-20 | End: 2023-12-20

## 2023-12-20 RX ORDER — POLYETHYLENE GLYCOL-3350 AND ELECTROLYTES WITH FLAVOR PACK 240; 5.84; 2.98; 6.72; 22.72 G/278.26G; G/278.26G; G/278.26G; G/278.26G; G/278.26G
4000 POWDER, FOR SOLUTION ORAL ONCE
Qty: 4000 ML | Refills: 0 | Status: SHIPPED | OUTPATIENT
Start: 2023-12-20 | End: 2023-12-20

## 2023-12-20 NOTE — PATIENT INSTRUCTIONS
"Procedure: EGD/Colonoscopy    Diagnosis:  Ulcerative colitis suspect ischemia and Barretts esophagus  Procedure Timin-12 weeks    *If within 4 weeks selected, please rashida as high priority*    *If greater than 12 weeks, please select "5-12 weeks" and delay sending until 3 months prior to requested date*    Provider: Myself    Location: [address]    Additional Scheduling Information: No scheduling concerns    Prep Specifications:Standard prep    Is the patient taking a GLP-1 Agonist:no    Have you attached a patient to this message: yes   "

## 2023-12-20 NOTE — PROGRESS NOTES
"GENERAL GI PATIENT INTAKE:    COVID symptoms in the last 7 days (runny nose, sore throat, congestion, cough, fever): No  PCP: Geena Roman  If not PCP-  number given to establish [phone]: N/A    ALLERGIES REVIEWED:  Yes    CHIEF COMPLAINT:    Chief Complaint   Patient presents with    EGD    Colonoscopy       VITAL SIGNS:  BP (!) 141/85   Pulse 75   Ht 5' 6" (1.676 m)   Wt 96.2 kg (212 lb 1.3 oz)   LMP  (LMP Unknown)   BMI 34.23 kg/m²      Change in medical, surgical, family or social history: No      REVIEWED MEDICATION LIST RECONCILED INCLUDING ABOVE MEDS:  Yes     "

## 2023-12-20 NOTE — TELEPHONE ENCOUNTER
"----- Message from Carlos Manuel Barber MD sent at 2023 10:45 AM CST -----  Procedure: EGD/Colonoscopy    Diagnosis:  Ulcerative colitis suspect ischemia and Barretts esophagus  Procedure Timin-12 weeks    #If within 4 weeks selected, please rashida as high priority#    #If greater than 12 weeks, please select "5-12 weeks" and delay sending until 3 months prior to requested date#    Provider: Myself    Location: [address]    Additional Scheduling Information: No scheduling concerns    Prep Specifications:Standard prep    Is the patient taking a GLP-1 Agonist:no    Have you attached a patient to this message: yes   "

## 2023-12-20 NOTE — Clinical Note
"Procedure: EGD/Colonoscopy  Diagnosis:  Ulcerative colitis suspect ischemia and Barretts esophagus Procedure Timin-12 weeks  *If within 4 weeks selected, please rashida as high priority*  *If greater than 12 weeks, please select "5-12 weeks" and delay sending until 3 months prior to requested date*  Provider: Myself  Location: [address]  Additional Scheduling Information: No scheduling concerns  Prep Specifications:Standard prep  Is the patient taking a GLP-1 Agonist:no  Have you attached a patient to this message: yes "

## 2023-12-20 NOTE — Clinical Note
GI MA team Please schedule patient for 2nd floor lab work today orders placed Please have patient see endoscopy schedulers to schedule her EGD colonoscopy Return to GI clinic 1 year for follow-up

## 2023-12-20 NOTE — PROGRESS NOTES
[facility] Consultation Note    Reason for Consult:  The primary encounter diagnosis was Encounter for monitoring long-term proton pump inhibitor therapy. Diagnoses of Brito's esophagus without dysplasia, Gastroesophageal reflux disease, unspecified whether esophagitis present, Ischemic colitis, and Class 1 obesity without serious comorbidity with body mass index (BMI) of 34.0 to 34.9 in adult, unspecified obesity type were also pertinent to this visit.    PCP:   Geena Roman       Referring MD:  No referring provider defined for this encounter.    Initial History of Present Illness (HPI):  This is a 63 y.o. female here for evaluation of short-segment Barretts and follow-up of ischemic colitis patient is intolerant a PPI she is taking Pepcid at night can not do a PPI she has no dysplasia she did not do a follow-up colonoscopy she would like to schedule EGD colonoscopy next available no dysphagia no odynophagia no weight loss no change in bowel habits no early satiety no blood in her stool.    Abdominal pain - no  Reflux -as above  Dysphagia - no   Bowel habits - normal  GI bleeding - none  NSAID usage - none    Interval HPI 12/20/2023:  The patient's last visit with me was on 1/12/2023.      ROS:  Constitutional: No fevers, chills, No weight loss  ENT:  As above well controlled heartburn no dysphagia no odynophagia no hoarseness  CV: No chest pain, no palpitation  Pulm: No cough, No shortness of breath, no wheezing  Ophtho: No vision changes  GI: see HPI  Derm: No rash, no itching  Heme: No lymphadenopathy, No easy bruising  MSK: No significant arthritis  : No dysuria, No hematuria  Endo: No hot or cold intolerance  Neuro: No syncope, No seizure, no strokes  Psych: No uncontrolled anxiety, No uncontrolled depression    Medical History:  has a past medical history of Allergy, Brito's esophagus, Basal cell cancer, Cataract, Diverticulosis, Edema, Hiatal hernia (1/31/2017),  "IBS (irritable bowel syndrome), Obesity, Rectovaginal fistula (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    Surgical History:  has a past surgical history that includes Cholecystectomy; Mohs' procedure of face; Colonoscopy; Esophagogastroduodenoscopy; Laparoscopic Nissen fundoplication (2/15/16); Inguinal hernia repair; Umbilical hernia repair; RECTO VAGINAL FISTULA (2011, 2012); Esophagogastroduodenoscopy (N/A, 9/18/2019); Esophagogastroduodenoscopy (N/A, 7/11/2022); and Colonoscopy (N/A, 7/11/2022).    Family History: family history includes Arthritis in her father; Cancer in her daughter; Cataracts in her father and mother; Diabetes in her mother; Glaucoma in her father and mother; Heart disease in her father, maternal aunt, and maternal grandmother; Hyperlipidemia in her father; Hypertension in her mother; Miscarriages / Stillbirths in her mother; No Known Problems in her brother, maternal grandfather, maternal uncle, paternal aunt, paternal grandfather, paternal grandmother, paternal uncle, sister, and son..     Social History:  reports that she quit smoking about 40 years ago. Her smoking use included cigarettes. She started smoking about 45 years ago. She has a 1.3 pack-year smoking history. She has never used smokeless tobacco. She reports current alcohol use of about 1.0 standard drink of alcohol per week. She reports that she does not use drugs.    Review of patient's allergies indicates:   Allergen Reactions    Penicillins Rash       Medication List with Changes/Refills   Current Medications    FAMOTIDINE (PEPCID) 20 MG TABLET    TAKE TWO TABLETS BY MOUTH DAILY IN THE EVENING.    FLUTICASONE PROPIONATE (FLONASE) 50 MCG/ACTUATION NASAL SPRAY    1 spray by Each Nostril route daily as needed.    METOPROLOL TARTRATE (LOPRESSOR) 25 MG TABLET    TAKE 1/2 TABLET BY MOUTH 2 TIMES A DAY         Objective Findings:    Vital Signs:  BP (!) 141/85   Pulse 75   Ht 5' 6" (1.676 m)   Wt 96.2 kg (212 lb 1.3 oz)   " LMP  (LMP Unknown)   BMI 34.23 kg/m²   Body mass index is 34.23 kg/m².    Physical Exam:  General Appearance: Well appearing in no acute distress  Eyes:    No scleral icterus  ENT:  No lesions or masses   Lungs: CTA bilaterally, no wheezes, no rhonchi, no rales  Heart:  S1, S2 normal, no murmurs heard  Abdomen:  Obese, Non distended, soft, no guarding, no rebound, no tenderness, no appreciated ascites, no bruits, no hepatosplenomegaly,  No CVA tenderness, no appreciated hernias, no Bustamante sign no McBurney point tenderness  Musculoskeletal:  No major joint deformities  Skin: No petechiae or rash on exposed skin areas  Neurologic:  Alert and oriented x4  Psychiatric:  Normal speech mentation and affect    Labs:  Lab Results   Component Value Date    WBC 8.93 06/26/2023    HGB 13.3 06/26/2023    HCT 40.1 06/26/2023     06/26/2023    CHOL 229 (H) 06/26/2023    TRIG 104 06/26/2023    HDL 65 06/26/2023    ALT 20 06/26/2023    AST 24 06/26/2023     06/26/2023    K 4.4 06/26/2023     06/26/2023    CREATININE 0.56 06/26/2023    BUN 17 06/26/2023    CO2 23 06/26/2023    TSH 0.935 06/26/2023    INR 1.5 (H) 12/14/2016    HGBA1C 5.7 (H) 06/26/2023        Medical Decision Making:  Lab work reviewed  Lab work talk given EGD colonoscopy talk given restricted diet talk given bowel prep talk given          Assessment:  1. Encounter for monitoring long-term proton pump inhibitor therapy    2. Brito's esophagus without dysplasia    3. Gastroesophageal reflux disease, unspecified whether esophagitis present    4. Ischemic colitis    5. Class 1 obesity without serious comorbidity with body mass index (BMI) of 34.0 to 34.9 in adult, unspecified obesity type         Recommendations:  1. Second floor lab work today  2. Referral to endoscopy schedulers for EGD colonoscopy  3. Patient with Barretts esophagus intolerant to PPI she is being managed with H2 blockers currently  4. Return to GI clinic 1 year for  follow-up    Follow up in about 1 year (around 12/20/2024).      Order summary:  Orders Placed This Encounter    Vitamin B12    Vitamin D    Lipase    Comprehensive Metabolic Panel    CBC Auto Differential    Ferritin    Iron and TIBC         Thank you so much for allowing me to participate in the care of Rosanna Mayfaisal Lynncary    Carlos Manuel Barber MD    DISCLAIMER: This note was prepared with [employer] voice recognition transcription software. Garbled syntax, mangled or inadvertent pronouns, and other bizarre constructions may be attributed to that software system. While efforts were made to correct any mistakes made by this voice recognition program, some errors and/or omissions may remain in the note that were missed when the note was originally created.

## 2023-12-20 NOTE — TELEPHONE ENCOUNTER
Spoke to Rosanna to schedule procedure(s) Colonoscopy/EGD       Physician to perform procedure(s) Dr. LANE Barber  Date of Procedure (s) 04/16/24  Arrival Time 6:30 AM  Time of Procedure(s) 7:30 AM   Location of Procedure(s) [place] 4th Floor  Type of Rx Prep sent to patient: Suprep  Instructions provided to patient via [facility]    Patient was informed on the following information and verbalized understanding. Screening questionnaire reviewed with patient and complete. If procedure requires anesthesia, a responsible adult needs to be present to accompany the patient home, patient cannot drive after receiving anesthesia. Appointment details are tentative, especially check-in time. Patient will receive a prep-op call 7 days prior to confirm check-in time for procedure. If applicable the patient should contact their pharmacy to verify Rx for procedure prep is ready for pick-up. Patient was advised to call the scheduling department at [phone] if pharmacy states no Rx is available. Patient was advised to call the endoscopy scheduling department if any questions or concerns arise.       Endoscopy Scheduling Department

## 2024-01-07 ENCOUNTER — PATIENT MESSAGE (OUTPATIENT)
Dept: GASTROENTEROLOGY | Facility: CLINIC | Age: 64
End: 2024-01-07
Payer: COMMERCIAL

## 2024-01-07 DIAGNOSIS — E61.1 IRON DEFICIENCY: Primary | ICD-10-CM

## 2024-01-07 RX ORDER — FERROUS GLUCONATE 324(38)MG
324 TABLET ORAL
Qty: 90 TABLET | Refills: 1 | Status: SHIPPED | OUTPATIENT
Start: 2024-01-07 | End: 2024-07-05

## 2024-01-07 NOTE — PROGRESS NOTES
GI MA team - please tell patient that they are iron deficient but not anemic and recommend that they take ferrous gluconate one 324mg pill once daily for next 3 months.    GI MA team -  Please order repeat fasting CBC, Iron/TIBC, and Ferritin in 8 weeks - Orders placed.

## 2024-01-22 DIAGNOSIS — K21.00 GASTROESOPHAGEAL REFLUX DISEASE WITH ESOPHAGITIS WITHOUT HEMORRHAGE: ICD-10-CM

## 2024-01-22 RX ORDER — FAMOTIDINE 20 MG/1
TABLET, FILM COATED ORAL
Qty: 60 TABLET | Refills: 2 | Status: SHIPPED | OUTPATIENT
Start: 2024-01-22 | End: 2024-03-13

## 2024-03-01 ENCOUNTER — OFFICE VISIT (OUTPATIENT)
Dept: INTERNAL MEDICINE | Facility: CLINIC | Age: 64
End: 2024-03-01
Payer: COMMERCIAL

## 2024-03-01 ENCOUNTER — LAB VISIT (OUTPATIENT)
Dept: LAB | Facility: HOSPITAL | Age: 64
End: 2024-03-01
Attending: INTERNAL MEDICINE
Payer: COMMERCIAL

## 2024-03-01 VITALS
WEIGHT: 203.25 LBS | SYSTOLIC BLOOD PRESSURE: 118 MMHG | HEART RATE: 71 BPM | BODY MASS INDEX: 32.66 KG/M2 | DIASTOLIC BLOOD PRESSURE: 66 MMHG | OXYGEN SATURATION: 98 % | HEIGHT: 66 IN

## 2024-03-01 DIAGNOSIS — Z78.0 POSTMENOPAUSAL: ICD-10-CM

## 2024-03-01 DIAGNOSIS — E61.1 IRON DEFICIENCY: ICD-10-CM

## 2024-03-01 DIAGNOSIS — F41.9 ANXIETY: Primary | ICD-10-CM

## 2024-03-01 DIAGNOSIS — J32.9 SINUSITIS, UNSPECIFIED CHRONICITY, UNSPECIFIED LOCATION: ICD-10-CM

## 2024-03-01 LAB
BASOPHILS # BLD AUTO: 0.1 K/UL (ref 0–0.2)
BASOPHILS NFR BLD: 1.1 % (ref 0–1.9)
DIFFERENTIAL METHOD BLD: ABNORMAL
EOSINOPHIL # BLD AUTO: 0.2 K/UL (ref 0–0.5)
EOSINOPHIL NFR BLD: 2.5 % (ref 0–8)
ERYTHROCYTE [DISTWIDTH] IN BLOOD BY AUTOMATED COUNT: 12.5 % (ref 11.5–14.5)
FERRITIN SERPL-MCNC: 112 NG/ML (ref 20–300)
HCT VFR BLD AUTO: 41.7 % (ref 37–48.5)
HGB BLD-MCNC: 13.2 G/DL (ref 12–16)
IMM GRANULOCYTES # BLD AUTO: 0.05 K/UL (ref 0–0.04)
IMM GRANULOCYTES NFR BLD AUTO: 0.5 % (ref 0–0.5)
IRON SERPL-MCNC: 59 UG/DL (ref 30–160)
LYMPHOCYTES # BLD AUTO: 2.1 K/UL (ref 1–4.8)
LYMPHOCYTES NFR BLD: 22.3 % (ref 18–48)
MCH RBC QN AUTO: 29.3 PG (ref 27–31)
MCHC RBC AUTO-ENTMCNC: 31.7 G/DL (ref 32–36)
MCV RBC AUTO: 93 FL (ref 82–98)
MONOCYTES # BLD AUTO: 1.1 K/UL (ref 0.3–1)
MONOCYTES NFR BLD: 11.7 % (ref 4–15)
NEUTROPHILS # BLD AUTO: 5.8 K/UL (ref 1.8–7.7)
NEUTROPHILS NFR BLD: 61.9 % (ref 38–73)
NRBC BLD-RTO: 0 /100 WBC
PLATELET # BLD AUTO: 442 K/UL (ref 150–450)
PMV BLD AUTO: 9.6 FL (ref 9.2–12.9)
RBC # BLD AUTO: 4.5 M/UL (ref 4–5.4)
SATURATED IRON: 18 % (ref 20–50)
TOTAL IRON BINDING CAPACITY: 334 UG/DL (ref 250–450)
TRANSFERRIN SERPL-MCNC: 226 MG/DL (ref 200–375)
WBC # BLD AUTO: 9.42 K/UL (ref 3.9–12.7)

## 2024-03-01 PROCEDURE — 82728 ASSAY OF FERRITIN: CPT | Performed by: INTERNAL MEDICINE

## 2024-03-01 PROCEDURE — 3074F SYST BP LT 130 MM HG: CPT | Mod: CPTII,S$GLB,, | Performed by: INTERNAL MEDICINE

## 2024-03-01 PROCEDURE — 99214 OFFICE O/P EST MOD 30 MIN: CPT | Mod: S$GLB,,, | Performed by: INTERNAL MEDICINE

## 2024-03-01 PROCEDURE — 99999 PR PBB SHADOW E&M-EST. PATIENT-LVL IV: CPT | Mod: PBBFAC,,, | Performed by: INTERNAL MEDICINE

## 2024-03-01 PROCEDURE — 36415 COLL VENOUS BLD VENIPUNCTURE: CPT | Performed by: INTERNAL MEDICINE

## 2024-03-01 PROCEDURE — 3008F BODY MASS INDEX DOCD: CPT | Mod: CPTII,S$GLB,, | Performed by: INTERNAL MEDICINE

## 2024-03-01 PROCEDURE — 1159F MED LIST DOCD IN RCRD: CPT | Mod: CPTII,S$GLB,, | Performed by: INTERNAL MEDICINE

## 2024-03-01 PROCEDURE — 1160F RVW MEDS BY RX/DR IN RCRD: CPT | Mod: CPTII,S$GLB,, | Performed by: INTERNAL MEDICINE

## 2024-03-01 PROCEDURE — 83540 ASSAY OF IRON: CPT | Performed by: INTERNAL MEDICINE

## 2024-03-01 PROCEDURE — 85025 COMPLETE CBC W/AUTO DIFF WBC: CPT | Performed by: INTERNAL MEDICINE

## 2024-03-01 PROCEDURE — 3078F DIAST BP <80 MM HG: CPT | Mod: CPTII,S$GLB,, | Performed by: INTERNAL MEDICINE

## 2024-03-01 PROCEDURE — 1101F PT FALLS ASSESS-DOCD LE1/YR: CPT | Mod: CPTII,S$GLB,, | Performed by: INTERNAL MEDICINE

## 2024-03-01 PROCEDURE — 3288F FALL RISK ASSESSMENT DOCD: CPT | Mod: CPTII,S$GLB,, | Performed by: INTERNAL MEDICINE

## 2024-03-01 RX ORDER — AZITHROMYCIN 250 MG/1
TABLET, FILM COATED ORAL
Qty: 6 TABLET | Refills: 0 | Status: SHIPPED | OUTPATIENT
Start: 2024-03-01 | End: 2024-04-29

## 2024-03-01 RX ORDER — SERTRALINE HYDROCHLORIDE 50 MG/1
50 TABLET, FILM COATED ORAL DAILY
Qty: 90 TABLET | Refills: 3 | Status: SHIPPED | OUTPATIENT
Start: 2024-03-01 | End: 2024-04-03

## 2024-03-01 NOTE — PROGRESS NOTES
"Subjective:       Patient ID: Rosanna Live is a 64 y.o. female.    Chief Complaint: Follow-up  This is a 64-year-old who presents today for follow-up she had a few concerns she has been having trouble with rhinitis nasal congestion recently has been using conservative measures some improvement but she does tend to get sinusitis intermittently.  She has been doing better with her venous insufficiency wearing support stockings and also trying to get her weight down improvement.  She has been doing weight watchers which has been helpful She has been following with Gastroenterology and taking iron supplements currently usually she has issues with diarrhea but has been doing better with the iron she has some repeat testing and some scopes planned in the future.  Main concern today is that she has been having some increasing anxiety tearful at times in relation to a lot issues in the family going on including previous death of her  years ago recent diagnosis of amyloidosis in her daughter and son who has young children and wife is wanting a divorce she is having difficulty with some of her family stressors and feels that she has a lot of nervous energy has needing a get on medication she previously took medication in the past when her  passed she would like to consider getting back on something like Zoloft.    Follow-up  Associated symptoms include congestion.     Review of Systems   HENT:  Positive for congestion and postnasal drip.    Respiratory:  Negative for shortness of breath.    Psychiatric/Behavioral:  The patient is nervous/anxious.        Objective:    Blood pressure 118/66, pulse 71, height 5' 6" (1.676 m), weight 92.2 kg (203 lb 4.2 oz), SpO2 98 %.   Physical Exam  Constitutional:       General: She is not in acute distress.  HENT:      Head: Normocephalic.      Comments: Nasal congestion       Mouth/Throat:      Pharynx: Oropharynx is clear.   Cardiovascular:      Rate and Rhythm: " Normal rate and regular rhythm.      Heart sounds: Normal heart sounds. No murmur heard.     No friction rub. No gallop.   Pulmonary:      Effort: Pulmonary effort is normal. No respiratory distress.      Breath sounds: Normal breath sounds.   Abdominal:      General: Bowel sounds are normal.      Palpations: Abdomen is soft. There is no mass.      Tenderness: There is no abdominal tenderness.   Skin:     Findings: No erythema.   Neurological:      Mental Status: She is alert.         Assessment:       1. Anxiety    2. Postmenopausal    3. Sinusitis, unspecified chronicity, unspecified location        Plan:       Rosanna Marshall was seen today for follow-up.    Diagnoses and all orders for this visit:    Anxiety  Discussed with patient she would like to start medication we discussed options she would like to start Zoloft prescription sent will start 50 mg tablet and we can increase if needed    Postmenopausal  She will be due for bone density order placed for scheduling  -     DXA Bone Density Axial Skeleton 1 or more sites; Future    Sinusitis, unspecified chronicity, unspecified location  Continue conservative measures but if no improvement increased concerns prescription was sent to have on hand    Other orders  -     sertraline (ZOLOFT) 50 MG tablet; Take 1 tablet (50 mg total) by mouth once daily.  -     azithromycin (Z-ANABELL) 250 MG tablet; Take two tablets  First day then one tablet daily to complete course    For her anemia she continues to follow with GI and undergoing additional testing    She will follow up for repeat check on medications 6-10 weeks call if concerns sooner  We discussed counseling and she does have some resources that she may seek out when she is ready

## 2024-03-13 DIAGNOSIS — K21.00 GASTROESOPHAGEAL REFLUX DISEASE WITH ESOPHAGITIS WITHOUT HEMORRHAGE: ICD-10-CM

## 2024-03-13 RX ORDER — FAMOTIDINE 20 MG/1
TABLET, FILM COATED ORAL
Qty: 180 TABLET | Refills: 1 | Status: SHIPPED | OUTPATIENT
Start: 2024-03-13

## 2024-03-29 DIAGNOSIS — E61.1 IRON DEFICIENCY: ICD-10-CM

## 2024-03-29 RX ORDER — FERROUS GLUCONATE 324(38)MG
324 TABLET ORAL
Qty: 90 TABLET | Refills: 1 | Status: SHIPPED | OUTPATIENT
Start: 2024-03-29 | End: 2024-09-25

## 2024-03-29 NOTE — PROGRESS NOTES
GI MA team - please tell patient that they are iron deficient but not  anemic and recommend that they take ferrous gluconate one 324mg pill once daily for next 3 months.    GI MA team -  Please order repeat fasting Hemoglobin, Iron/TIBC, and Ferritin in 8 weeks - Orders placed.

## 2024-04-05 ENCOUNTER — OFFICE VISIT (OUTPATIENT)
Dept: CARDIOLOGY | Facility: CLINIC | Age: 64
End: 2024-04-05
Payer: MEDICARE

## 2024-04-05 VITALS
SYSTOLIC BLOOD PRESSURE: 118 MMHG | WEIGHT: 199.31 LBS | BODY MASS INDEX: 32.03 KG/M2 | DIASTOLIC BLOOD PRESSURE: 74 MMHG | HEIGHT: 66 IN | OXYGEN SATURATION: 98 % | HEART RATE: 70 BPM

## 2024-04-05 DIAGNOSIS — R00.2 PALPITATIONS: ICD-10-CM

## 2024-04-05 DIAGNOSIS — I47.19 ATRIAL TACHYCARDIA: Primary | ICD-10-CM

## 2024-04-05 DIAGNOSIS — E78.5 HYPERLIPIDEMIA, UNSPECIFIED HYPERLIPIDEMIA TYPE: ICD-10-CM

## 2024-04-05 DIAGNOSIS — E66.09 CLASS 1 OBESITY DUE TO EXCESS CALORIES WITHOUT SERIOUS COMORBIDITY WITH BODY MASS INDEX (BMI) OF 33.0 TO 33.9 IN ADULT: ICD-10-CM

## 2024-04-05 DIAGNOSIS — I87.2 VENOUS INSUFFICIENCY: ICD-10-CM

## 2024-04-05 DIAGNOSIS — I70.0 AORTIC ATHEROSCLEROSIS: ICD-10-CM

## 2024-04-05 PROCEDURE — 1101F PT FALLS ASSESS-DOCD LE1/YR: CPT | Mod: CPTII,S$GLB,, | Performed by: INTERNAL MEDICINE

## 2024-04-05 PROCEDURE — 99999 PR PBB SHADOW E&M-EST. PATIENT-LVL III: CPT | Mod: PBBFAC,,, | Performed by: INTERNAL MEDICINE

## 2024-04-05 PROCEDURE — 99214 OFFICE O/P EST MOD 30 MIN: CPT | Mod: S$GLB,,, | Performed by: INTERNAL MEDICINE

## 2024-04-05 PROCEDURE — 3288F FALL RISK ASSESSMENT DOCD: CPT | Mod: CPTII,S$GLB,, | Performed by: INTERNAL MEDICINE

## 2024-04-05 PROCEDURE — 3074F SYST BP LT 130 MM HG: CPT | Mod: CPTII,S$GLB,, | Performed by: INTERNAL MEDICINE

## 2024-04-05 PROCEDURE — 3078F DIAST BP <80 MM HG: CPT | Mod: CPTII,S$GLB,, | Performed by: INTERNAL MEDICINE

## 2024-04-05 PROCEDURE — 1159F MED LIST DOCD IN RCRD: CPT | Mod: CPTII,S$GLB,, | Performed by: INTERNAL MEDICINE

## 2024-04-05 PROCEDURE — 3008F BODY MASS INDEX DOCD: CPT | Mod: CPTII,S$GLB,, | Performed by: INTERNAL MEDICINE

## 2024-04-05 PROCEDURE — 1160F RVW MEDS BY RX/DR IN RCRD: CPT | Mod: CPTII,S$GLB,, | Performed by: INTERNAL MEDICINE

## 2024-04-05 NOTE — PROGRESS NOTES
[facility] Cardiology     Subjective:    Patient ID:  Rosanna Live is a 64 y.o. female who presents for follow-up of Hyperlipidemia and Atrial tachycardia history    Review of patient's allergies indicates:   Allergen Reactions    Penicillins Rash      She has followed with Dr. Brooks and had a workup for palpitations in the past.  She takes metoprolol tartrate for diagnosis of atrial tachycardia.  Her Holter did show some PVCs.  Her echo shows normal ejection fraction completed in 2020.  LVH was reported, no significant valvular disease noted.  She is a former smoker.  She is a retired educator.    She used to have a lot of swelling related to venous insufficiency.  She does not require loop diuretics.  She used to wear compression stockings.  After she retired the condition improved significantly.    She has elevated LDL.  She has aortic atherosclerosis.  She does not want to take statin therapy.  There is no history of hypertension.  She denies any palpitations currently.        Review of Systems   Constitutional: Positive for weight loss. Negative for chills, decreased appetite, diaphoresis, fever, malaise/fatigue, night sweats and weight gain.   HENT:  Negative for congestion, ear discharge, ear pain, hearing loss, hoarse voice, nosebleeds, odynophagia, sore throat, stridor and tinnitus.    Eyes:  Negative for blurred vision, discharge, double vision, pain, photophobia, redness, vision loss in left eye, vision loss in right eye, visual disturbance and visual halos.   Cardiovascular:  Positive for leg swelling. Negative for chest pain, claudication, cyanosis, dyspnea on exertion, irregular heartbeat, near-syncope, orthopnea, palpitations, paroxysmal nocturnal dyspnea and syncope.   Respiratory:  Negative for cough, hemoptysis, shortness of breath, sleep disturbances due to breathing, snoring, sputum production and wheezing.   "  Endocrine: Negative for cold intolerance, heat intolerance, polydipsia, polyphagia and polyuria.   Hematologic/Lymphatic: Negative for adenopathy and bleeding problem. Does not bruise/bleed easily.   Skin:  Negative for color change, dry skin, flushing, itching, nail changes, poor wound healing, rash, skin cancer, suspicious lesions and unusual hair distribution.   Musculoskeletal:  Negative for arthritis, back pain, falls, gout, joint pain, joint swelling, muscle cramps, muscle weakness, myalgias, neck pain and stiffness.   Gastrointestinal:  Negative for bloating, abdominal pain, anorexia, change in bowel habit, bowel incontinence, constipation, diarrhea, dysphagia, excessive appetite, flatus, heartburn, hematemesis, hematochezia, hemorrhoids, jaundice, melena, nausea and vomiting.   Genitourinary:  Negative for bladder incontinence, decreased libido, dysuria, flank pain, frequency, genital sores, hematuria, hesitancy, incomplete emptying, nocturia and urgency.   Neurological:  Negative for aphonia, brief paralysis, difficulty with concentration, disturbances in coordination, excessive daytime sleepiness, dizziness, focal weakness, headaches, light-headedness, loss of balance, numbness, paresthesias, seizures, sensory change, tremors, vertigo and weakness.   Psychiatric/Behavioral:  Negative for altered mental status, depression, hallucinations, memory loss, substance abuse, suicidal ideas and thoughts of violence. The patient does not have insomnia and is not nervous/anxious.    Allergic/Immunologic: Negative for hives and persistent infections.        Objective:       Vitals:    04/05/24 0909   BP: 118/74   Pulse: 70   SpO2: 98%   Weight: 90.4 kg (199 lb 4.7 oz)   Height: 5' 6" (1.676 m)    Physical Exam  Constitutional:       General: She is not in acute distress.     Appearance: She is well-developed. She is not diaphoretic.   HENT:      Head: Normocephalic and atraumatic.      Nose: Nose normal.   Eyes:     "  General: No scleral icterus.        Right eye: No discharge.      Conjunctiva/sclera: Conjunctivae normal.      Pupils: Pupils are equal, round, and reactive to light.   Neck:      Thyroid: No thyromegaly.      Vascular: No JVD.      Trachea: No tracheal deviation.   Cardiovascular:      Rate and Rhythm: Normal rate and regular rhythm.      Pulses:           Carotid pulses are 2+ on the right side and 2+ on the left side.       Radial pulses are 2+ on the right side and 2+ on the left side.        Dorsalis pedis pulses are 2+ on the right side and 2+ on the left side.        Posterior tibial pulses are 2+ on the right side and 2+ on the left side.      Heart sounds: Normal heart sounds. No murmur heard.     No friction rub. No gallop.   Pulmonary:      Effort: Pulmonary effort is normal. No respiratory distress.      Breath sounds: Normal breath sounds. No stridor. No wheezing or rales.   Chest:      Chest wall: No tenderness.   Abdominal:      General: Bowel sounds are normal. There is no distension.      Palpations: Abdomen is soft. There is no mass.      Tenderness: There is no abdominal tenderness. There is no guarding or rebound.   Musculoskeletal:         General: No tenderness. Normal range of motion.      Cervical back: Normal range of motion and neck supple.   Lymphadenopathy:      Cervical: No cervical adenopathy.   Skin:     General: Skin is warm and dry.      Coloration: Skin is not pale.      Findings: No erythema or rash.   Neurological:      Mental Status: She is alert and oriented to person, place, and time.      Cranial Nerves: No cranial nerve deficit.      Coordination: Coordination normal.   Psychiatric:         Behavior: Behavior normal.         Thought Content: Thought content normal.         Judgment: Judgment normal.           Assessment:       1. Atrial tachycardia    2. Venous insufficiency    3. Palpitations    4. Aortic atherosclerosis    5. Hyperlipidemia, unspecified hyperlipidemia  type    6. Class 1 obesity due to excess calories without serious comorbidity with body mass index (BMI) of 33.0 to 33.9 in adult      Results for orders placed or performed in visit on 03/01/24   Iron and TIBC   Result Value Ref Range    Iron 59 30 - 160 ug/dL    Transferrin 226 200 - 375 mg/dL    TIBC 334 250 - 450 ug/dL    Saturated Iron 18 (L) 20 - 50 %   Ferritin   Result Value Ref Range    Ferritin 112 20.0 - 300.0 ng/mL   CBC Auto Differential   Result Value Ref Range    WBC 9.42 3.90 - 12.70 K/uL    RBC 4.50 4.00 - 5.40 M/uL    Hemoglobin 13.2 12.0 - 16.0 g/dL    Hematocrit 41.7 37.0 - 48.5 %    MCV 93 82 - 98 fL    MCH 29.3 27.0 - 31.0 pg    MCHC 31.7 (L) 32.0 - 36.0 g/dL    RDW 12.5 11.5 - 14.5 %    Platelets 442 150 - 450 K/uL    MPV 9.6 9.2 - 12.9 fL    Immature Granulocytes 0.5 0.0 - 0.5 %    Gran # (ANC) 5.8 1.8 - 7.7 K/uL    Immature Grans (Abs) 0.05 (H) 0.00 - 0.04 K/uL    Lymph # 2.1 1.0 - 4.8 K/uL    Mono # 1.1 (H) 0.3 - 1.0 K/uL    Eos # 0.2 0.0 - 0.5 K/uL    Baso # 0.10 0.00 - 0.20 K/uL    nRBC 0 0 /100 WBC    Gran % 61.9 38.0 - 73.0 %    Lymph % 22.3 18.0 - 48.0 %    Mono % 11.7 4.0 - 15.0 %    Eosinophil % 2.5 0.0 - 8.0 %    Basophil % 1.1 0.0 - 1.9 %    Differential Method Automated          Current Outpatient Medications:     azithromycin (Z-ANABELL) 250 MG tablet, Take two tablets  First day then one tablet daily to complete course, Disp: 6 tablet, Rfl: 0    EScitalopram oxalate (LEXAPRO) 10 MG tablet, Take 1 tablet (10 mg total) by mouth once daily., Disp: 30 tablet, Rfl: 11    famotidine (PEPCID) 20 MG tablet, TAKE TWO TABLETS BY MOUTH DAILY IN THE EVENING., Disp: 180 tablet, Rfl: 1    ferrous gluconate (FERGON) 324 MG tablet, Take 1 tablet (324 mg total) by mouth daily with breakfast., Disp: 90 tablet, Rfl: 1    fluticasone propionate (FLONASE) 50 mcg/actuation nasal spray, 1 spray by Each Nostril route daily as needed., Disp: , Rfl:     metoprolol tartrate (LOPRESSOR) 25 MG tablet, TAKE 1/2  TABLET BY MOUTH 2 TIMES A DAY, Disp: 90 tablet, Rfl: 3     Lab Results   Component Value Date    WBC 9.42 03/01/2024    RBC 4.50 03/01/2024    HGB 13.2 03/01/2024    HCT 41.7 03/01/2024    MCV 93 03/01/2024    MCH 29.3 03/01/2024    MCHC 31.7 (L) 03/01/2024    RDW 12.5 03/01/2024     03/01/2024    MPV 9.6 03/01/2024    GRAN 5.8 03/01/2024    GRAN 61.9 03/01/2024    LYMPH 2.1 03/01/2024    LYMPH 22.3 03/01/2024    MONO 1.1 (H) 03/01/2024    MONO 11.7 03/01/2024    EOS 0.2 03/01/2024    BASO 0.10 03/01/2024    EOSINOPHIL 2.5 03/01/2024    BASOPHIL 1.1 03/01/2024    MG 2.1 12/09/2019        CMP  Lab Results   Component Value Date     12/20/2023    K 4.3 12/20/2023     12/20/2023    CO2 27 12/20/2023    GLU 98 12/20/2023    BUN 14 12/20/2023    CREATININE 0.7 12/20/2023    CALCIUM 9.7 12/20/2023    PROT 7.4 12/20/2023    ALBUMIN 3.7 12/20/2023    BILITOT 0.4 12/20/2023    ALKPHOS 95 12/20/2023    AST 17 12/20/2023    ALT 17 12/20/2023    ANIONGAP 9 12/20/2023    ESTGFRAFRICA >60.0 04/07/2022    EGFRNONAA >60.0 04/07/2022        Lab Results   Component Value Date    LABURIN  12/11/2014     STREPTOCOCCUS AGALACTIAE (GROUP B)  > 100,000 cfu/ml  Susceptibility testing not routinely performed              Results for orders placed or performed in visit on 04/10/23   EKG 12-lead    Collection Time: 04/10/23 11:21 AM    Narrative    Test Reason : R00.2,R07.89,    Vent. Rate : 064 BPM     Atrial Rate : 064 BPM     P-R Int : 154 ms          QRS Dur : 092 ms      QT Int : 400 ms       P-R-T Axes : 040 062 025 degrees     QTc Int : 412 ms    Normal sinus rhythm  Normal ECG  When compared with ECG of 19-DEC-2019 12:24,  No significant change was found  Confirmed by Todd CRONIN MD, Maurice MURPHY ([id number]) on 4/10/2023 1:29:15 PM    Referred By:             Confirmed By:Maurice Brooks III, MD                  Plan:       Problem List Items Addressed This Visit          Cardiac/Vascular    Venous insufficiency      Currently not with significant edema.  Condition improved post care home.         Atrial tachycardia - Primary     Nonsustained events by Holter monitor.  There were also PACs and PVCs.  She has had a favorable response to low-dose Lopressor.  It will be continued.  She does not have a history of hypertension.         Palpitations     No complaints, beta-blocker to continue.         Aortic atherosclerosis     Noted on imaging studies.  Condition stable.         Hyperlipidemia     Current  mg%.  We discussed statins, she does not want to take them.  Weight loss and exercise with low-cholesterol diet advised.            Endocrine    Class 1 obesity due to excess calories without serious comorbidity with body mass index (BMI) of 33.0 to 33.9 in adult     Weight loss encouraged.             Metoprolol will be continued.  It is working well and controlling her palpitation complaints.  She does not want to start statin therapy.  She is losing weight and her LDL is lower this year than the previous year.  Most recent reading 143 mg%.    I advised a 1 year follow-up.  She is doing well without any new complaints.               Lane Simpson MD  04/05/2024   9:37 AM

## 2024-04-05 NOTE — ASSESSMENT & PLAN NOTE
Current  mg%.  We discussed statins, she does not want to take them.  Weight loss and exercise with low-cholesterol diet advised.

## 2024-04-05 NOTE — ASSESSMENT & PLAN NOTE
Nonsustained events by Holter monitor.  There were also PACs and PVCs.  She has had a favorable response to low-dose Lopressor.  It will be continued.  She does not have a history of hypertension.

## 2024-04-11 ENCOUNTER — PATIENT MESSAGE (OUTPATIENT)
Dept: ENDOSCOPY | Facility: HOSPITAL | Age: 64
End: 2024-04-11
Payer: MEDICARE

## 2024-04-12 ENCOUNTER — TELEPHONE (OUTPATIENT)
Dept: ENDOSCOPY | Facility: HOSPITAL | Age: 64
End: 2024-04-12
Payer: MEDICARE

## 2024-04-15 ENCOUNTER — PATIENT MESSAGE (OUTPATIENT)
Dept: ADMINISTRATIVE | Facility: HOSPITAL | Age: 64
End: 2024-04-15
Payer: MEDICARE

## 2024-04-16 ENCOUNTER — ANESTHESIA (OUTPATIENT)
Dept: ENDOSCOPY | Facility: HOSPITAL | Age: 64
End: 2024-04-16
Payer: MEDICARE

## 2024-04-16 ENCOUNTER — HOSPITAL ENCOUNTER (OUTPATIENT)
Facility: HOSPITAL | Age: 64
Discharge: HOME | End: 2024-04-16
Attending: INTERNAL MEDICINE | Admitting: INTERNAL MEDICINE
Payer: MEDICARE

## 2024-04-16 ENCOUNTER — ANESTHESIA EVENT (OUTPATIENT)
Dept: ENDOSCOPY | Facility: HOSPITAL | Age: 64
End: 2024-04-16
Payer: MEDICARE

## 2024-04-16 VITALS
WEIGHT: 195 LBS | SYSTOLIC BLOOD PRESSURE: 133 MMHG | HEART RATE: 61 BPM | OXYGEN SATURATION: 99 % | DIASTOLIC BLOOD PRESSURE: 67 MMHG | BODY MASS INDEX: 31.34 KG/M2 | TEMPERATURE: 98 F | RESPIRATION RATE: 24 BRPM | HEIGHT: 66 IN

## 2024-04-16 DIAGNOSIS — K22.70 BARRETT ESOPHAGUS: ICD-10-CM

## 2024-04-16 DIAGNOSIS — Z12.11 SCREEN FOR COLON CANCER: ICD-10-CM

## 2024-04-16 PROCEDURE — 45381 COLONOSCOPY SUBMUCOUS NJX: CPT | Performed by: INTERNAL MEDICINE

## 2024-04-16 PROCEDURE — 37000008 HC ANESTHESIA 1ST 15 MINUTES: Performed by: INTERNAL MEDICINE

## 2024-04-16 PROCEDURE — 27201089 HC SNARE, DISP (ANY): Performed by: INTERNAL MEDICINE

## 2024-04-16 PROCEDURE — 43239 EGD BIOPSY SINGLE/MULTIPLE: CPT | Performed by: INTERNAL MEDICINE

## 2024-04-16 PROCEDURE — 45380 COLONOSCOPY AND BIOPSY: CPT | Mod: ,,, | Performed by: INTERNAL MEDICINE

## 2024-04-16 PROCEDURE — 37000009 HC ANESTHESIA EA ADD 15 MINS: Performed by: INTERNAL MEDICINE

## 2024-04-16 PROCEDURE — 25000003 PHARM REV CODE 250: Performed by: NURSE ANESTHETIST, CERTIFIED REGISTERED

## 2024-04-16 PROCEDURE — 63600175 PHARM REV CODE 636 W HCPCS: Performed by: NURSE ANESTHETIST, CERTIFIED REGISTERED

## 2024-04-16 PROCEDURE — 88305 TISSUE EXAM BY PATHOLOGIST: CPT | Mod: 26,,, | Performed by: PATHOLOGY

## 2024-04-16 PROCEDURE — 88342 IMHCHEM/IMCYTCHM 1ST ANTB: CPT | Performed by: PATHOLOGY

## 2024-04-16 PROCEDURE — 27202363 HC INJECTION AGENT, SUBMUCOSAL, ANY: Performed by: INTERNAL MEDICINE

## 2024-04-16 PROCEDURE — 88305 TISSUE EXAM BY PATHOLOGIST: CPT | Mod: 59 | Performed by: PATHOLOGY

## 2024-04-16 PROCEDURE — 27201012 HC FORCEPS, HOT/COLD, DISP: Performed by: INTERNAL MEDICINE

## 2024-04-16 PROCEDURE — 45380 COLONOSCOPY AND BIOPSY: CPT | Performed by: INTERNAL MEDICINE

## 2024-04-16 PROCEDURE — 45381 COLONOSCOPY SUBMUCOUS NJX: CPT | Mod: 51,GC,, | Performed by: INTERNAL MEDICINE

## 2024-04-16 PROCEDURE — 43239 EGD BIOPSY SINGLE/MULTIPLE: CPT | Mod: 51,,, | Performed by: INTERNAL MEDICINE

## 2024-04-16 PROCEDURE — 88342 IMHCHEM/IMCYTCHM 1ST ANTB: CPT | Mod: 26,,, | Performed by: PATHOLOGY

## 2024-04-16 PROCEDURE — E9220 PRA ENDO ANESTHESIA: HCPCS | Mod: ,,, | Performed by: NURSE ANESTHETIST, CERTIFIED REGISTERED

## 2024-04-16 RX ORDER — LIDOCAINE HYDROCHLORIDE 20 MG/ML
INJECTION INTRAVENOUS
Status: DISCONTINUED | OUTPATIENT
Start: 2024-04-16 | End: 2024-04-16

## 2024-04-16 RX ORDER — SODIUM CHLORIDE 9 MG/ML
INJECTION, SOLUTION INTRAVENOUS CONTINUOUS
Status: DISCONTINUED | OUTPATIENT
Start: 2024-04-16 | End: 2024-04-16 | Stop reason: HOSPADM

## 2024-04-16 RX ORDER — PROPOFOL 10 MG/ML
VIAL (ML) INTRAVENOUS
Status: DISCONTINUED | OUTPATIENT
Start: 2024-04-16 | End: 2024-04-16

## 2024-04-16 RX ADMIN — PROPOFOL 100 MG: 10 INJECTION, EMULSION INTRAVENOUS at 08:04

## 2024-04-16 RX ADMIN — SODIUM CHLORIDE: 0.9 INJECTION, SOLUTION INTRAVENOUS at 07:04

## 2024-04-16 RX ADMIN — SODIUM CHLORIDE: 0.9 INJECTION, SOLUTION INTRAVENOUS at 08:04

## 2024-04-16 RX ADMIN — LIDOCAINE HYDROCHLORIDE 100 MG: 20 INJECTION INTRAVENOUS at 08:04

## 2024-04-16 NOTE — PROVATION PATIENT INSTRUCTIONS
Discharge Summary/Instructions after an Endoscopic Procedure  Patient Name: Rosanna Live  Patient MRN: [id number]  Patient   YOB: 1960 Tuesday, April 16, 2024  Carlos Manuel Barber MD  Dear patient,  As a result of recent federal legislation (The Federal Cures Act), you may   receive lab or pathology results from your procedure in your [facility]   account before your physician is able to contact you. Your physician or   their representative will relay the results to you with their   recommendations at their soonest availability.  Thank you,  RESTRICTIONS:  During your procedure today, you received medications for sedation.  These   medications may affect your judgment, balance and coordination.  Therefore,   for 24 hours, you have the following restrictions:   - DO NOT drive a car, operate machinery, make legal/financial decisions,   sign important papers or drink alcohol.    ACTIVITY:  Today: no heavy lifting, straining or running due to procedural   sedation/anesthesia.  The following day: return to full activity including work.  DIET:  Eat and drink normally unless instructed otherwise.     TREATMENT FOR COMMON SIDE EFFECTS:  - Mild abdominal pain, nausea, belching, bloating or excessive gas:  rest,   eat lightly and use a heating pad.  - Sore Throat: treat with throat lozenges and/or gargle with warm salt   water.  - Because air was used during the procedure, expelling large amounts of air   from your rectum or belching is normal.  - If a bowel prep was taken, you may not have a bowel movement for 1-3 days.    This is normal.  SYMPTOMS TO WATCH FOR AND REPORT TO YOUR PHYSICIAN:  1. Abdominal pain or bloating, other than gas cramps.  2. Chest pain.  3. Back pain.  4. Signs of infection such as: chills or fever occurring within 24 hours   after the procedure.  5. Rectal bleeding, which would show as bright red, maroon, or black stools.   (A tablespoon of blood from the rectum is not serious,  especially if   hemorrhoids are present.)  6. Vomiting.  7. Weakness or dizziness.  GO DIRECTLY TO THE NEAREST EMERGENCY ROOM IF YOU HAVE ANY OF THE FOLLOWING:      Difficulty breathing              Chills and/or fever over 101 F   Persistent vomiting and/or vomiting blood   Severe abdominal pain   Severe chest pain   Black, tarry stools   Bleeding- more than one tablespoon   Any other symptom or condition that you feel may need urgent attention  Your doctor recommends these additional instructions:  If any biopsies were taken, your doctors clinic will contact you in 1 to 2   weeks with any results.  - Discharge patient to home.   - Follow an antireflux regimen indefinitely.   - Await pathology results.   - Telephone endoscopist for pathology results in 3 weeks.   - Repeat upper endoscopy in 3 years for surveillance based on pathology   results.   - Return to GI clinic.   - The findings and recommendations were discussed with the patient.  For questions, problems or results please call your physician - Carlos Manuel Barber MD at Work:  [phone].  [facility], EMERGENCY ROOM PHONE NUMBER: [phone]  IF A COMPLICATION OR EMERGENCY SITUATION ARISES AND YOU ARE UNABLE TO REACH   YOUR PHYSICIAN - GO DIRECTLY TO THE EMERGENCY ROOM.  Carlos Manuel Barber MD  4/16/2024 9:11:45 AM  This report has been verified and signed electronically.  Dear patient,  As a result of recent federal legislation (The Federal Cures Act), you may   receive lab or pathology results from your procedure in your [facility]   account before your physician is able to contact you. Your physician or   their representative will relay the results to you with their   recommendations at their soonest availability.  Thank you,  PROVATION

## 2024-04-16 NOTE — ANESTHESIA PREPROCEDURE EVALUATION
04/16/2024  Rosanna Live is a 64 y.o., female.  Past Medical History:   Diagnosis Date    Allergy     Brito's esophagus     Basal cell cancer     Cataract     Diverticulosis     Edema     chronic right foot     Hiatal hernia 1/31/2017    IBS (irritable bowel syndrome)     Obesity     Rectovaginal fistula 6/28/2012    Vitamin D deficiency disease      Past Surgical History:   Procedure Laterality Date    CHOLECYSTECTOMY      COLONOSCOPY      COLONOSCOPY N/A 7/11/2022    Procedure: COLONOSCOPY;  Surgeon: Carlos Manuel Barber MD;  [facility]  Service: Endoscopy;  Laterality: N/A;  golytely    ESOPHAGOGASTRODUODENOSCOPY      ESOPHAGOGASTRODUODENOSCOPY N/A 9/18/2019    Procedure: EGD (ESOPHAGOGASTRODUODENOSCOPY);  Surgeon: Carlos Manuel Barber MD;  [facility]  Service: Endoscopy;  Laterality: N/A;  evaluate fundoplication    ESOPHAGOGASTRODUODENOSCOPY N/A 7/11/2022    Procedure: EGD (ESOPHAGOGASTRODUODENOSCOPY);  Surgeon: Carlos Manuel Barber MD;  [facility]  Service: Endoscopy;  Laterality: N/A;  Covid test 7/8 [facility] [place], instructions sent to [email or web address]    INGUINAL HERNIA REPAIR      LAPAROSCOPIC NISSEN FUNDOPLICATION  2/15/16    Mohs' procedure of face      RECTO VAGINAL FISTULA  2011, 2012    UMBILICAL HERNIA REPAIR           Pre-op Assessment    I have reviewed the Patient Summary Reports.     I have reviewed the Nursing Notes. I have reviewed the NPO Status.   I have reviewed the Medications.     Review of Systems  Anesthesia Hx:  No problems with previous Anesthesia             Denies Family Hx of Anesthesia complications.    Denies Personal Hx of Anesthesia complications.                    Hematology/Oncology:  Hematology Normal   Oncology Normal                                   EENT/Dental:  EENT/Dental Normal            Cardiovascular:  Cardiovascular Normal                                            Pulmonary:  Pulmonary Normal                       Renal/:  Renal/ Normal                 Hepatic/GI:    Hiatal Hernia,              Musculoskeletal:  Musculoskeletal Normal                Neurological:    Neuromuscular Disease,                                   Endocrine:  Endocrine Normal            Dermatological:  Skin Normal    Psych:  Psychiatric Normal                    Physical Exam  General: Well nourished    Airway:  Mallampati: II   Mouth Opening: Normal  TM Distance: Normal  Tongue: Normal  Neck ROM: Normal ROM    Dental:  Intact        Anesthesia Plan  Type of Anesthesia, risks & benefits discussed:    Anesthesia Type: Gen Natural Airway  Intra-op Monitoring Plan: Standard ASA Monitors  Informed Consent: Informed consent signed with the Patient and all parties understand the risks and agree with anesthesia plan.  All questions answered.   ASA Score: 2  Day of Surgery Review of History & Physical: H&P Update referred to the surgeon/provider.I have interviewed and examined the patient. I have reviewed the patient's H&P dated: There are no significant changes.     Ready For Surgery From Anesthesia Perspective.     .

## 2024-04-16 NOTE — H&P
Short Stay Endoscopy History and Physical    PCP - Geena Roman MD  Referring Physician - Carlos Manuel Barber MD  [address] [place] [address]    Procedure - Endoscopy  ASA - per anesthesia  Mallampati - per anesthesia  History of Anesthesia problems - no  Family history Anesthesia problems -  no   Plan of anesthesia - General    HPI  64 y.o. 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    Surgical History:  has a past surgical history that includes Cholecystectomy; Mohs' procedure of face; Colonoscopy; Esophagogastroduodenoscopy; Laparoscopic Nissen fundoplication (2/15/16); Inguinal hernia repair; Umbilical hernia repair; RECTO VAGINAL FISTULA (2011, 2012); Esophagogastroduodenoscopy (N/A, 9/18/2019); Esophagogastroduodenoscopy (N/A, 7/11/2022); and Colonoscopy (N/A, 7/11/2022).    Family History: family history includes Arthritis in her father; Cancer in her daughter; Cataracts in her father and mother; Diabetes in her mother; Glaucoma in her father and mother; Heart disease in her father, maternal aunt, and maternal grandmother; Hyperlipidemia in her father; Hypertension in her mother; Miscarriages / Stillbirths in her mother; No Known Problems in her brother, maternal grandfather, maternal uncle, paternal aunt, paternal grandfather, paternal grandmother, paternal uncle, sister, and son..    Social History:  reports that she quit smoking about 40 years ago. Her smoking use included cigarettes. She started smoking about 45 years ago. She has a 1.3  pack-year smoking history. She has never used smokeless tobacco. She reports current alcohol use of about 1.0 standard drink of alcohol per week. She reports that she does not use drugs.    Review of patient's allergies indicates:   Allergen Reactions    Penicillins Rash       Medications:   Medications Prior to Admission   Medication Sig Dispense Refill Last Dose    azithromycin (Z-ANABELL) 250 MG tablet Take two tablets  First day then one tablet daily to complete course 6 tablet 0     EScitalopram oxalate (LEXAPRO) 10 MG tablet Take 1 tablet (10 mg total) by mouth once daily. 30 tablet 11     famotidine (PEPCID) 20 MG tablet TAKE TWO TABLETS BY MOUTH DAILY IN THE EVENING. 180 tablet 1     ferrous gluconate (FERGON) 324 MG tablet Take 1 tablet (324 mg total) by mouth daily with breakfast. 90 tablet 1     fluticasone propionate (FLONASE) 50 mcg/actuation nasal spray 1 spray by Each Nostril route daily as needed.       metoprolol tartrate (LOPRESSOR) 25 MG tablet TAKE 1/2 TABLET BY MOUTH 2 TIMES A DAY 90 tablet 3        Physical Exam:    Vital Signs: There were no vitals filed for this visit.    General Appearance: Well appearing in no acute distress  Abdomen: Soft, non tender, non distended with normal bowel sounds, no masses    Labs:  Lab Results   Component Value Date    WBC 9.42 03/01/2024    HGB 13.2 03/01/2024    HCT 41.7 03/01/2024     03/01/2024    CHOL 229 (H) 06/26/2023    TRIG 104 06/26/2023    HDL 65 06/26/2023    ALT 17 12/20/2023    AST 17 12/20/2023     12/20/2023    K 4.3 12/20/2023     12/20/2023    CREATININE 0.7 12/20/2023    BUN 14 12/20/2023    CO2 27 12/20/2023    TSH 0.935 06/26/2023    INR 1.5 (H) 12/14/2016    HGBA1C 5.7 (H) 06/26/2023       I have explained the risks and benefits of this endoscopic procedure to the patient including but not limited to bleeding, inflammation, infection, perforation, and death.      Chalo Peralta MD

## 2024-04-16 NOTE — ANESTHESIA POSTPROCEDURE EVALUATION
Anesthesia Post Evaluation    Patient: Rosanna Live    Procedure(s) Performed: Procedure(s) (LRB):  EGD (ESOPHAGOGASTRODUODENOSCOPY) (N/A)  COLONOSCOPY (N/A)    Final Anesthesia Type: general      Patient location during evaluation: GI PACU  Patient participation: Yes- Able to Participate  Level of consciousness: awake and alert  Post-procedure vital signs: reviewed and stable  Pain management: adequate  Airway patency: patent    PONV status at discharge: No PONV  Anesthetic complications: no      Cardiovascular status: blood pressure returned to baseline  Respiratory status: unassisted  Hydration status: euvolemic  Follow-up not needed.          Vitals Value Taken Time   /67 04/16/24 0930   Temp 36.4 °C (97.5 °F) 04/16/24 0900   Pulse 61 04/16/24 0930   Resp 24 04/16/24 0930   SpO2 99 % 04/16/24 0930         Event Time   Out of Recovery 09:33:12         Pain/Shadia Score: Shadia Score: 10 (4/16/2024  9:02 AM)

## 2024-04-16 NOTE — TRANSFER OF CARE
"Anesthesia Transfer of Care Note    Patient: Rosanna Live    Procedure(s) Performed: Procedure(s) (LRB):  EGD (ESOPHAGOGASTRODUODENOSCOPY) (N/A)  COLONOSCOPY (N/A)    Patient location: GI    Anesthesia Type: general    Transport from OR: Transported from OR on room air with adequate spontaneous ventilation    Post pain: adequate analgesia    Post assessment: no apparent anesthetic complications    Post vital signs: stable    Level of consciousness: awake    Nausea/Vomiting: no nausea/vomiting    Complications: none    Transfer of care protocol was followed      Last vitals: Visit Vitals  /68 (BP Location: Left arm, Patient Position: Lying)   Pulse 67   Temp 36.1 °C (97 °F) (Temporal)   Resp 16   Ht 5' 6" (1.676 m)   Wt 88.5 kg (195 lb)   LMP  (LMP Unknown)   SpO2 95%   Breastfeeding No   BMI 31.47 kg/m²     "

## 2024-04-16 NOTE — PLAN OF CARE
Discharge instructions, Provation report and education reviewed with patient.  Patient conveyed understanding of all information. All questions and concerns addressed.    Dr. Barber spoke with patient at bedside in recovery.

## 2024-04-16 NOTE — PROVATION PATIENT INSTRUCTIONS
Discharge Summary/Instructions after an Endoscopic Procedure  Patient Name: Rosanna Live  Patient MRN: [id number]  Patient   YOB: 1960 Tuesday, April 16, 2024  Carlos Manuel Barber MD  Dear patient,  As a result of recent federal legislation (The Federal Cures Act), you may   receive lab or pathology results from your procedure in your [facility]   account before your physician is able to contact you. Your physician or   their representative will relay the results to you with their   recommendations at their soonest availability.  Thank you,  RESTRICTIONS:  During your procedure today, you received medications for sedation.  These   medications may affect your judgment, balance and coordination.  Therefore,   for 24 hours, you have the following restrictions:   - DO NOT drive a car, operate machinery, make legal/financial decisions,   sign important papers or drink alcohol.    ACTIVITY:  Today: no heavy lifting, straining or running due to procedural   sedation/anesthesia.  The following day: return to full activity including work.  DIET:  Eat and drink normally unless instructed otherwise.     TREATMENT FOR COMMON SIDE EFFECTS:  - Mild abdominal pain, nausea, belching, bloating or excessive gas:  rest,   eat lightly and use a heating pad.  - Sore Throat: treat with throat lozenges and/or gargle with warm salt   water.  - Because air was used during the procedure, expelling large amounts of air   from your rectum or belching is normal.  - If a bowel prep was taken, you may not have a bowel movement for 1-3 days.    This is normal.  SYMPTOMS TO WATCH FOR AND REPORT TO YOUR PHYSICIAN:  1. Abdominal pain or bloating, other than gas cramps.  2. Chest pain.  3. Back pain.  4. Signs of infection such as: chills or fever occurring within 24 hours   after the procedure.  5. Rectal bleeding, which would show as bright red, maroon, or black stools.   (A tablespoon of blood from the rectum is not serious,  especially if   hemorrhoids are present.)  6. Vomiting.  7. Weakness or dizziness.  GO DIRECTLY TO THE NEAREST EMERGENCY ROOM IF YOU HAVE ANY OF THE FOLLOWING:      Difficulty breathing              Chills and/or fever over 101 F   Persistent vomiting and/or vomiting blood   Severe abdominal pain   Severe chest pain   Black, tarry stools   Bleeding- more than one tablespoon   Any other symptom or condition that you feel may need urgent attention  Your doctor recommends these additional instructions:  If any biopsies were taken, your doctors clinic will contact you in 1 to 2   weeks with any results.  - Discharge patient to home.   - Await pathology results.   - Telephone endoscopist for pathology results in 3 weeks.   - Repeat colonoscopy in 6 months to check healing - sooner for any   dysplasia.   - Return to GI clinic at the next available appointment.   - Consider avoiding all non-steroidal anti-inflammatory drugs (mobic,   ibuprofen, naproxen, etc.), unless needed for cardiovascular protection.    Recommend you discuss with your prescribing doctor (of your aspirin) to see   if cardiovascular benefits of your aspirin outweigh the risks of GI   bleeding. O.K. to take aspirin if needed for cardiovascular protection.  - The findings and recommendations were discussed with the patient.  For questions, problems or results please call your physician - Carlos Manuel Barber MD at Work:  [phone].  [facility], EMERGENCY ROOM PHONE NUMBER: [phone]  IF A COMPLICATION OR EMERGENCY SITUATION ARISES AND YOU ARE UNABLE TO REACH   YOUR PHYSICIAN - GO DIRECTLY TO THE EMERGENCY ROOM.  Carlos Manuel Barber MD  4/16/2024 9:02:37 AM  This report has been verified and signed electronically.  Dear patient,  As a result of recent federal legislation (The Federal Cures Act), you may   receive lab or pathology results from your procedure in your [facility]   account before your physician is able to contact you. Your  physician or   their representative will relay the results to you with their   recommendations at their soonest availability.  Thank you,  [employer]ATION

## 2024-04-17 ENCOUNTER — PATIENT OUTREACH (OUTPATIENT)
Dept: ADMINISTRATIVE | Facility: HOSPITAL | Age: 64
End: 2024-04-17
Payer: MEDICARE

## 2024-04-17 DIAGNOSIS — Z12.31 SCREENING MAMMOGRAM, ENCOUNTER FOR: Primary | ICD-10-CM

## 2024-04-18 LAB
FINAL PATHOLOGIC DIAGNOSIS: NORMAL
GROSS: NORMAL
Lab: NORMAL

## 2024-04-18 RX ORDER — METOPROLOL TARTRATE 25 MG/1
25 TABLET, FILM COATED ORAL 2 TIMES DAILY
Qty: 90 TABLET | Refills: 3 | Status: SHIPPED | OUTPATIENT
Start: 2024-04-18

## 2024-04-19 RX ORDER — METOPROLOL TARTRATE 25 MG/1
25 TABLET, FILM COATED ORAL DAILY
Qty: 90 TABLET | Refills: 3 | Status: SHIPPED | OUTPATIENT
Start: 2024-04-19 | End: 2024-04-29

## 2024-04-22 ENCOUNTER — HOSPITAL ENCOUNTER (OUTPATIENT)
Dept: RADIOLOGY | Facility: CLINIC | Age: 64
Discharge: HOME | End: 2024-04-22
Attending: INTERNAL MEDICINE
Payer: MEDICARE

## 2024-04-22 DIAGNOSIS — Z78.0 POSTMENOPAUSAL: ICD-10-CM

## 2024-04-22 PROCEDURE — 77080 DXA BONE DENSITY AXIAL: CPT | Mod: 26,,, | Performed by: INTERNAL MEDICINE

## 2024-04-22 PROCEDURE — 77080 DXA BONE DENSITY AXIAL: CPT | Mod: TC

## 2024-04-23 ENCOUNTER — PATIENT MESSAGE (OUTPATIENT)
Dept: GASTROENTEROLOGY | Facility: CLINIC | Age: 64
End: 2024-04-23
Payer: MEDICARE

## 2024-04-23 NOTE — PROGRESS NOTES
IMPORTANT:    Arin please schedule Rosanna for telemedicine video visit with me next week to discuss nonspecific mild chronic active colitis in the distal transverse colon etiology unknown nonspecific      1. DISTAL ESOPHAGUS, BIOPSY:  Benign squamous and glandular mucosa (gastric cardia-type)  No evidence of intestinal metaplasia, dysplasia or malignancy  No evidence of significant eosinophils present    2. MID DISTAL TRANSVERSE COLON, BIOPSY:  Colonic mucosa with focal ulceration and reactive/regenerative changes  Negative for granuloma, dysplasia or malignancy  Immunostain for CMV is negative  (performed with appropriate controls)    3. DISTAL TRANSVERSE COLON, BIOPSY:  Mild chronic active colitis  Negative for granuloma, dysplasia or malignancy    Comment  Biopsy findings from colonic mucosa are non-specific and could be secondary to drugs, infection, inflammatory bowel disease etc. A clinical correlation is recommended. Clinical concern of ischemic colitis is noticed. There are no definite features of  ischemic colitis in these biopsies.   Comment: Interp By La Hanson M.D., Signed on 04/18/2024

## 2024-04-29 ENCOUNTER — OFFICE VISIT (OUTPATIENT)
Dept: GASTROENTEROLOGY | Facility: CLINIC | Age: 64
End: 2024-04-29
Payer: MEDICARE

## 2024-04-29 DIAGNOSIS — K51.919 ULCERATIVE COLITIS WITH COMPLICATION, UNSPECIFIED LOCATION: Primary | ICD-10-CM

## 2024-04-29 DIAGNOSIS — Z87.19 HISTORY OF BARRETT'S ESOPHAGUS: ICD-10-CM

## 2024-04-29 PROCEDURE — 99213 OFFICE O/P EST LOW 20 MIN: CPT | Mod: 95,,, | Performed by: INTERNAL MEDICINE

## 2024-04-29 PROCEDURE — 1160F RVW MEDS BY RX/DR IN RCRD: CPT | Mod: CPTII,95,, | Performed by: INTERNAL MEDICINE

## 2024-04-29 PROCEDURE — 1159F MED LIST DOCD IN RCRD: CPT | Mod: CPTII,95,, | Performed by: INTERNAL MEDICINE

## 2024-04-29 RX ORDER — MESALAMINE 1.2 G/1
2.4 TABLET, DELAYED RELEASE ORAL
Qty: 60 TABLET | Refills: 5 | Status: SHIPPED | OUTPATIENT
Start: 2024-04-29 | End: 2024-05-01

## 2024-04-29 RX ORDER — MESALAMINE 1.2 G/1
2.4 TABLET, DELAYED RELEASE ORAL
Qty: 60 TABLET | Refills: 5 | Status: SHIPPED | OUTPATIENT
Start: 2024-04-29 | End: 2024-04-29 | Stop reason: SDUPTHER

## 2024-04-29 NOTE — Clinical Note
Arin please schedule Rosanna for 12 hour fasting lab work in 3 weeks and a CT enterography in 4 weeks orders placed  Return to GI clinic 3 months for follow-up

## 2024-04-29 NOTE — PATIENT INSTRUCTIONS
"Procedure: Colonoscopy    Diagnosis:  Ulcerative colitis follow-up to check for healing    Procedure Timin-20 weeks ( about 6 months from her last 1 which would be 2024)    *If within 4 weeks selected, please rashida as high priority*    *If greater than 12 weeks, please select "5-12 weeks" and delay sending until 3 months prior to requested date*    Location: [address]    Additional Scheduling Information: No scheduling concerns    Prep Specifications:Standard prep    Is the patient taking a GLP-1 Agonist:no    Have you attached a patient to this message: yes   "

## 2024-04-29 NOTE — Clinical Note
Arin please schedule Rosanna for follow-up GI telemedicine video visit in 3 months for IBD thank you

## 2024-04-29 NOTE — Clinical Note
Martir can you see if Rosanna's insurance company will cover Lialda 4.8 g once daily she has ulcerative colitis seen on recent colonoscopy looks like she needs a prior authorization.   Could you also let me know if it does not cover Lialda and they want Apriso or another 5 ASA medicine which 1 to prescribe based on cost and 30 day versus 90 day prescription whichever is better for her and what pharmacy is the best one for her. Thank you

## 2024-04-29 NOTE — PROGRESS NOTES
The patient location is:  At home  The chief complaint leading to consultation is:  Colitis and history of Barretts esophagus    Visit type: audiovisual    Face to Face time with patient:  25 minutes of total time spent on the encounter, which includes face to face time and non-face to face time preparing to see the patient (eg, review of tests), Obtaining and/or reviewing separately obtained history, Documenting clinical information in the electronic or other health record, Independently interpreting results (not separately reported) and communicating results to the patient/family/caregiver, or Care coordination (not separately reported).       Each patient to whom he or she provides medical services by telemedicine is:  (1) informed of the relationship between the physician and patient and the respective role of any other health care provider with respect to management of the patient; and (2) notified that he or she may decline to receive medical services by telemedicine and may withdraw from such care at any time.    Notes:          [facility] Consultation Note    Reason for Consult:  The primary encounter diagnosis was Ulcerative colitis with complication, unspecified location. A diagnosis of History of Brito's esophagus was also pertinent to this visit.    PCP:   Geena Roman       Referring MD:  No referring provider defined for this encounter.    Initial History of Present Illness (HPI):  This is a 64 y.o. female here for evaluation of recent colonoscopy showing ulcer of colitis findings not definitive for inflammatory bowel disease but not consistent with ischemic colitis.  Will start patient on 5 ASA Lialda 4 pills once daily recommend repeat colonoscopy 6 months test for mucosal healing.  Will repeat a CT enterography to follow-up to rule out mesenteric arterial stenosis or mesenteric adenitis.  Patient has a history of  short-segment Barretts and follow-up of ischemic  colitis patient is intolerant a PPI she is taking Pepcid at night can not do a PPI she has no dysplasia, no dysphagia no odynophagia no weight loss no change in bowel habits no early satiety no blood in her stool.  Patient did a recent EGD colonoscopy on April 16th 2024 no evidence of Barretts esophagus a 2 cm sliding hiatal hernia no erosive esophagitis.  Colonoscopy showed - Granular mucosa in the mid transverse/distal transverse colon. Biopsied. Tattooed.  A few ulcers in the proximal descending colon and in the distal transverse colon. Biopsied.  Colonoscopy pathology was read as follows no dysplasia mild chronic active colitis with focal ulceration.  2. MID DISTAL TRANSVERSE COLON, BIOPSY:  Colonic mucosa with focal ulceration and reactive/regenerative changes  Negative for granuloma, dysplasia or malignancy  Immunostain for CMV is negative  (performed with appropriate controls)    3. DISTAL TRANSVERSE COLON, BIOPSY:  Mild chronic active colitis  Negative for granuloma, dysplasia or malignancy    Comment  Biopsy findings from colonic mucosa are non-specific and could be secondary to drugs, infection, inflammatory bowel disease etc. A clinical correlation is recommended. Clinical concern of ischemic colitis is noticed. There are no definite features of  ischemic colitis in these biopsies.    Comment: Interp By La Hanson M.D., Signed on 04/18/2024         Abdominal pain - no  Reflux -as above  Dysphagia - no   Bowel habits - normal  GI bleeding - none  NSAID usage - none     Interval HPI 04/29/2024:  The patient's last visit with me was on 12/20/2023.     ROS:  Constitutional: No fevers, chills, No weight loss  ENT:  As above well controlled heartburn no dysphagia no odynophagia no hoarseness  CV: No chest pain, no palpitation  Pulm: No cough, No shortness of breath, no wheezing  Ophtho: No vision changes  GI: see HPI  Derm: No rash, no itching  Heme: No lymphadenopathy, No easy bruising  MSK: No significant  arthritis  : No dysuria, No hematuria  Endo: No hot or cold intolerance  Neuro: No syncope, No seizure, no strokes  Psych: No uncontrolled anxiety, No uncontrolled depression      Medical History:  has a past medical history of Allergy, Brito's esophagus, Basal cell cancer, Cataract, Diverticulosis, Edema, Hiatal hernia (1/31/2017), IBS (irritable bowel syndrome), Obesity, Rectovaginal fistula (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    Surgical History:  has a past surgical history that includes Cholecystectomy; Mohs' procedure of face; Colonoscopy; Esophagogastroduodenoscopy; Laparoscopic Nissen fundoplication (2/15/16); Inguinal hernia repair; Umbilical hernia repair; RECTO VAGINAL FISTULA (2011, 2012); Esophagogastroduodenoscopy (N/A, 9/18/2019); Esophagogastroduodenoscopy (N/A, 7/11/2022); Colonoscopy (N/A, 7/11/2022); Esophagogastroduodenoscopy (N/A, 4/16/2024); and Colonoscopy (N/A, 4/16/2024).    Family History: family history includes Arthritis in her father; Cancer in her daughter; Cataracts in her father and mother; Diabetes in her mother; Glaucoma in her father and mother; Heart disease in her father, maternal aunt, and maternal grandmother; Hyperlipidemia in her father; Hypertension in her mother; Miscarriages / Stillbirths in her mother; No Known Problems in her brother, maternal grandfather, maternal uncle, paternal aunt, paternal grandfather, paternal grandmother, paternal uncle, sister, and son..     Social History:  reports that she quit smoking about 40 years ago. Her smoking use included cigarettes. She started smoking about 45 years ago. She has a 1.3 pack-year smoking history. She has never used smokeless tobacco. She reports current alcohol use of about 1.0 standard drink of alcohol per week. She reports that she does not use drugs.    Review of patient's allergies indicates:   Allergen Reactions    Penicillins Rash       Medication List with Changes/Refills   New Medications     MESALAMINE (LIALDA) 1.2 GRAM TBEC    Take 2 tablets (2.4 g total) by mouth daily with breakfast.   Current Medications    AZITHROMYCIN (Z-ANABELL) 250 MG TABLET    Take two tablets  First day then one tablet daily to complete course    ESCITALOPRAM OXALATE (LEXAPRO) 10 MG TABLET    Take 1 tablet (10 mg total) by mouth once daily.    FAMOTIDINE (PEPCID) 20 MG TABLET    TAKE TWO TABLETS BY MOUTH DAILY IN THE EVENING.    FERROUS GLUCONATE (FERGON) 324 MG TABLET    Take 1 tablet (324 mg total) by mouth daily with breakfast.    FLUTICASONE PROPIONATE (FLONASE) 50 MCG/ACTUATION NASAL SPRAY    1 spray by Each Nostril route daily as needed.    METOPROLOL TARTRATE (LOPRESSOR) 25 MG TABLET    Take 1 tablet (25 mg total) by mouth once daily. TAKE 1/2 TABLET BY MOUTH 2 TIMES A DAY    METOPROLOL TARTRATE (LOPRESSOR) 25 MG TABLET    Take 1 tablet (25 mg total) by mouth 2 (two) times daily.         Objective Findings:    Vital Signs:  LMP  (LMP Unknown)   There is no height or weight on file to calculate BMI.    Physical Exam:  Telemedicine video visit  General Appearance: Well appearing in no acute distress  Neurologic:  Alert and oriented x4  Psychiatric:  Normal speech mentation and affect    Labs:  Lab Results   Component Value Date    WBC 9.42 03/01/2024    HGB 13.2 03/01/2024    HCT 41.7 03/01/2024     03/01/2024    CHOL 229 (H) 06/26/2023    TRIG 104 06/26/2023    HDL 65 06/26/2023    ALT 17 12/20/2023    AST 17 12/20/2023     12/20/2023    K 4.3 12/20/2023     12/20/2023    CREATININE 0.7 12/20/2023    BUN 14 12/20/2023    CO2 27 12/20/2023    TSH 0.935 06/26/2023    INR 1.5 (H) 12/14/2016    HGBA1C 5.7 (H) 06/26/2023       Medical Decision Making:  Lab work reviewed  CT scan talk given  Colonoscopy images and path in EGD reviewed and discussed with patient  5 ASA Lialda talk given  Follow-up colonoscopy talk given  Follow-up EGD talk given  The Olympus scope GIF-                          ([id number]) was  introduced through the mouth, and                          advanced to the third part of duodenum.   Findings:       The examined duodenum was normal.        The entire examined stomach was normal.        The cardia and gastric fundus were normal on retroflexion.        A 2 cm hiatal hernia was found. The proximal extent of the gastric        folds (end of tubular esophagus) was 34 cm from the incisors. The        hiatal narrowing was 36 cm from the incisors. The Z-line was 33 cm        from the incisors. Biopsies were taken with a cold forceps for        histology. Estimated blood loss was minimal. C0M1. tongue of        columnar appearing distal esophageal mucosa. bxed. No erosive        esophagitis and no other lesions seen with NBI or white light.   Impression:            - Normal examined duodenum.                          - Normal stomach.                          - 2 cm hiatal hernia. Biopsied.   Recommendation:        - Discharge patient to home.                          - Follow an antireflux regimen indefinitely.                          - Await pathology results.                          - Telephone endoscopist for pathology results in 3                          weeks.                          - Repeat upper endoscopy in 3 years for                          surveillance based on pathology results.                          - Return to GI clinic.                          - The findings and recommendations were discussed                          with the patient.   Attending Participation:        I was present and participated during the entire procedure,        including non-thakur portions.   Carlos Manuel Barber MD   4/16/2024   The Olympus scope PCF-H190DL ([id number]) was                          introduced through the anus and advanced to the                          cecum, identified by appendiceal orifice and                          ileocecal valve. The colonoscopy was performed                           without difficulty. The patient tolerated the                          procedure well. The quality of the bowel                          preparation was excellent. The ileocecal valve,                          appendiceal orifice, and rectum were photographed.                          Colonoscopy polyp detection was aided by The GI                          Genius intelligent endoscopy module.Scope                          insertion time was 6 minutes. Scope withdrawal                          time was 26 minutes. The total duration of the                          procedure was 32 minutes.   Findings:       Diverticula were found in the recto-sigmoid colon, sigmoid colon,        descending colon, transverse colon and ascending colon.        The perianal and digital rectal examinations were normal.        Diverticula were found in the recto-sigmoid colon, sigmoid colon,        descending colon, transverse colon and ascending colon.        A localized area of mildly granular mucosa was found in the mid        transverse colon/distal transverse colon. Biopsies with complete        removal of the granular mucosa was taken with three passes of 10mm        cold snare for histology. Area 3cm distal the the granular mucosa        area was tattooed with an injection of 2 mL of Spot (carbon black)        to help relocate area if needed based on histology results.        A few four mm ulcers were found in the proximal descending colon and        in the distal transverse colon. No bleeding was present. No stigmata        of recent bleeding were seen. Biopsies were taken with a cold        forceps for histology. Estimated blood loss was minimal.   Impression:            - Diverticulosis in the recto-sigmoid colon, in                          the sigmoid colon, in the descending colon, in the                          transverse colon and in the ascending colon.                          - Diverticulosis in the recto-sigmoid colon,  in                          the sigmoid colon, in the descending colon, in the                          transverse colon and in the ascending colon.                          - Granular mucosa in the mid transverse/distal                          transverse colon. Biopsied. Tattooed.                          - A few ulcers in the proximal descending colon                          and in the distal transverse colon. Biopsied.   Recommendation:        - Discharge patient to home.                          - Await pathology results.                          - Telephone endoscopist for pathology results in 3                          weeks.                          - Repeat colonoscopy in 6 months to check healing                          - sooner for any dysplasia.                          - Return to GI clinic at the next available                          appointment.                          - Consider avoiding all non-steroidal                          anti-inflammatory drugs (mobic, ibuprofen,                          naproxen, etc.), unless needed for cardiovascular                          protection. Recommend you discuss with your                          prescribing doctor (of your aspirin) to see if                          cardiovascular benefits of your aspirin outweigh                          the risks of GI bleeding. O.K. to take aspirin if                          needed for cardiovascular protection.                          - The findings and recommendations were discussed                          with the patient.   Attending Participation:        I was present from insertion to withdraw and performed procedure        with the fellow.   Carlos Manuel Barber MD   4/16/2024     1. DISTAL ESOPHAGUS, BIOPSY:  Benign squamous and glandular mucosa (gastric cardia-type)  No evidence of intestinal metaplasia, dysplasia or malignancy  No evidence of significant eosinophils present    2. MID DISTAL TRANSVERSE  COLON, BIOPSY:  Colonic mucosa with focal ulceration and reactive/regenerative changes  Negative for granuloma, dysplasia or malignancy  Immunostain for CMV is negative  (performed with appropriate controls)    3. DISTAL TRANSVERSE COLON, BIOPSY:  Mild chronic active colitis  Negative for granuloma, dysplasia or malignancy    Comment  Biopsy findings from colonic mucosa are non-specific and could be secondary to drugs, infection, inflammatory bowel disease etc. A clinical correlation is recommended. Clinical concern of ischemic colitis is noticed. There are no definite features of  ischemic colitis in these biopsies.    Comment: Interp By La Hanson M.D., Signed on 04/18/2024     Assessment:  1. Ulcerative colitis with complication, unspecified location    2. History of Brito's esophagus         Recommendations:  1. 12 hour fasting blood work  2. CT enterography to evaluate for mesenteric adenitis or significant mesenteric arterial stenosis or small-bowel inflammatory bowel disease  3. Start Lialda 4 pills once daily  4. History of Barretts esophagus no Barretts esophagus currently found patient intolerant to PPI on an H2 blocker follow GERD precautions next EGD 3-years from her last which would be April 2027  5. Return to GI clinic 3 months for follow-up    Follow up in about 3 months (around 7/29/2024).      Order summary:  Orders Placed This Encounter    CT Enterography Abd_Pelvis With Contrast    Lipase    CBC Auto Differential    Comprehensive Metabolic Panel    mesalamine (LIALDA) 1.2 gram TbEC         Thank you so much for allowing me to participate in the care of Rosanna Rolando Barber MD    DISCLAIMER: This note was prepared with [employer] voice recognition transcription software. Garbled syntax, mangled or inadvertent pronouns, and other bizarre constructions may be attributed to that software system. While efforts were made to correct any mistakes made by this voice recognition  program, some errors and/or omissions may remain in the note that were missed when the note was originally created.

## 2024-04-30 ENCOUNTER — PATIENT MESSAGE (OUTPATIENT)
Dept: GASTROENTEROLOGY | Facility: CLINIC | Age: 64
End: 2024-04-30
Payer: MEDICARE

## 2024-05-01 ENCOUNTER — PATIENT MESSAGE (OUTPATIENT)
Dept: GASTROENTEROLOGY | Facility: CLINIC | Age: 64
End: 2024-05-01
Payer: MEDICARE

## 2024-05-01 DIAGNOSIS — K52.9 IBD (INFLAMMATORY BOWEL DISEASE): Primary | ICD-10-CM

## 2024-05-01 RX ORDER — MESALAMINE 1.2 G/1
4.8 TABLET, DELAYED RELEASE ORAL
Qty: 120 TABLET | Refills: 5 | Status: SHIPPED | OUTPATIENT
Start: 2024-05-01 | End: 2024-05-03

## 2024-05-02 NOTE — ADDENDUM NOTE
Addendum  created 05/02/24 0825 by Willa Ramos CRNA    Attestation recorded in Intraprocedure, Intraprocedure Attestations filed

## 2024-05-03 DIAGNOSIS — K51.019 ULCERATIVE PANCOLITIS WITH COMPLICATION: Primary | ICD-10-CM

## 2024-05-03 RX ORDER — MESALAMINE 0.38 G/1
1.5 CAPSULE, EXTENDED RELEASE ORAL DAILY
Qty: 360 CAPSULE | Refills: 1 | Status: SHIPPED | OUTPATIENT
Start: 2024-05-03 | End: 2024-05-09 | Stop reason: SDUPTHER

## 2024-05-07 ENCOUNTER — OFFICE VISIT (OUTPATIENT)
Dept: INTERNAL MEDICINE | Facility: CLINIC | Age: 64
End: 2024-05-07
Payer: MEDICARE

## 2024-05-07 ENCOUNTER — HOSPITAL ENCOUNTER (OUTPATIENT)
Dept: RADIOLOGY | Facility: HOSPITAL | Age: 64
Discharge: HOME | End: 2024-05-07
Attending: INTERNAL MEDICINE
Payer: MEDICARE

## 2024-05-07 VITALS
SYSTOLIC BLOOD PRESSURE: 110 MMHG | HEIGHT: 66 IN | BODY MASS INDEX: 31.46 KG/M2 | WEIGHT: 195.75 LBS | OXYGEN SATURATION: 96 % | HEART RATE: 70 BPM | DIASTOLIC BLOOD PRESSURE: 70 MMHG

## 2024-05-07 DIAGNOSIS — M65.30 TRIGGER FINGER, UNSPECIFIED FINGER, UNSPECIFIED LATERALITY: ICD-10-CM

## 2024-05-07 DIAGNOSIS — M54.9 BACK PAIN, UNSPECIFIED BACK LOCATION, UNSPECIFIED BACK PAIN LATERALITY, UNSPECIFIED CHRONICITY: ICD-10-CM

## 2024-05-07 DIAGNOSIS — R73.9 HYPERGLYCEMIA: ICD-10-CM

## 2024-05-07 DIAGNOSIS — E78.5 HYPERLIPIDEMIA, UNSPECIFIED HYPERLIPIDEMIA TYPE: ICD-10-CM

## 2024-05-07 DIAGNOSIS — K51.80 OTHER ULCERATIVE COLITIS WITHOUT COMPLICATION: ICD-10-CM

## 2024-05-07 DIAGNOSIS — M25.50 ARTHRALGIA, UNSPECIFIED JOINT: Primary | ICD-10-CM

## 2024-05-07 DIAGNOSIS — F41.9 ANXIETY: ICD-10-CM

## 2024-05-07 DIAGNOSIS — M65.4 DE QUERVAIN'S DISEASE (TENOSYNOVITIS): ICD-10-CM

## 2024-05-07 PROCEDURE — 1160F RVW MEDS BY RX/DR IN RCRD: CPT | Mod: CPTII,S$GLB,, | Performed by: INTERNAL MEDICINE

## 2024-05-07 PROCEDURE — 1101F PT FALLS ASSESS-DOCD LE1/YR: CPT | Mod: CPTII,S$GLB,, | Performed by: INTERNAL MEDICINE

## 2024-05-07 PROCEDURE — 3074F SYST BP LT 130 MM HG: CPT | Mod: CPTII,S$GLB,, | Performed by: INTERNAL MEDICINE

## 2024-05-07 PROCEDURE — 3008F BODY MASS INDEX DOCD: CPT | Mod: CPTII,S$GLB,, | Performed by: INTERNAL MEDICINE

## 2024-05-07 PROCEDURE — 72070 X-RAY EXAM THORAC SPINE 2VWS: CPT | Mod: TC

## 2024-05-07 PROCEDURE — 99214 OFFICE O/P EST MOD 30 MIN: CPT | Mod: S$GLB,,, | Performed by: INTERNAL MEDICINE

## 2024-05-07 PROCEDURE — 1159F MED LIST DOCD IN RCRD: CPT | Mod: CPTII,S$GLB,, | Performed by: INTERNAL MEDICINE

## 2024-05-07 PROCEDURE — 3288F FALL RISK ASSESSMENT DOCD: CPT | Mod: CPTII,S$GLB,, | Performed by: INTERNAL MEDICINE

## 2024-05-07 PROCEDURE — 72070 X-RAY EXAM THORAC SPINE 2VWS: CPT | Mod: 26,,, | Performed by: RADIOLOGY

## 2024-05-07 PROCEDURE — 3078F DIAST BP <80 MM HG: CPT | Mod: CPTII,S$GLB,, | Performed by: INTERNAL MEDICINE

## 2024-05-07 PROCEDURE — 99999 PR PBB SHADOW E&M-EST. PATIENT-LVL IV: CPT | Mod: PBBFAC,,, | Performed by: INTERNAL MEDICINE

## 2024-05-07 NOTE — PROGRESS NOTES
"Subjective:       Patient ID: Rosanna Live is a 64 y.o. female.    Chief Complaint: Follow-up  This is a 64-year-old who presents today for follow-up she reports that she had been having a lot of issues with her GI tract and more diarrhea she notes that she had EGD and colonoscopy and the colonoscopy showed colitis she is being diagnosed and treated for ulcerative colitis and reports was started on medication which she is tolerating and has noted some improvement in her diarrhea.  She also has been having a lot of family stressors with her daughters diagnosis and her children so started on Lexapro for her anxiety and she has had improvement she is tolerating the medicine without difficulty would like to continue.  She notes in the last few months she has also had issues with arthritis symptoms she was concerned because her father has rheumatoid arthritis and she reports that she has been having joint pain in her hips knees back sometimes her upper back feels like it is more numb on occasion and she also has had some triggering in her fingers.  She does continue to follow with cardiology has declined statins she reports her metoprolol was increased at the last visit    Follow-up  Associated symptoms include arthralgias.     Review of Systems   Gastrointestinal:         Diarrhea but some improvement    Musculoskeletal:  Positive for arthralgias and back pain.       Objective:    Blood pressure 110/70, pulse 70, height 5' 6" (1.676 m), weight 88.8 kg (195 lb 12.3 oz), SpO2 96%.   Physical Exam  Constitutional:       General: She is not in acute distress.  HENT:      Head: Normocephalic.      Mouth/Throat:      Pharynx: Oropharynx is clear.   Eyes:      General: No scleral icterus.  Cardiovascular:      Rate and Rhythm: Normal rate and regular rhythm.      Heart sounds: Normal heart sounds. No murmur heard.     No friction rub. No gallop.   Pulmonary:      Effort: Pulmonary effort is normal. No respiratory " "distress.      Breath sounds: Normal breath sounds.   Abdominal:      General: Bowel sounds are normal.      Palpations: Abdomen is soft. There is no mass.      Tenderness: There is no abdominal tenderness.   Musculoskeletal:      Cervical back: Neck supple.      Comments: Stable edema    Dequerveyns tenosynvitis hands triggering finger    Crepitus knees    Skin:     Findings: No erythema.   Neurological:      Mental Status: She is alert.   Psychiatric:         Mood and Affect: Mood normal.         Assessment:       1. Arthralgia, unspecified joint    2. Hyperlipidemia, unspecified hyperlipidemia type    3. Hyperglycemia    4. Back pain, unspecified back location, unspecified back pain laterality, unspecified chronicity    5. De Quervain's disease (tenosynovitis)    6. Trigger finger, unspecified finger, unspecified laterality    7. Other ulcerative colitis without complication    8. Anxiety        Plan:       Rosanna Rolando "Rosanna" was seen today for follow-up.    Diagnoses and all orders for this visit:    Arthralgia, unspecified joint  Discussed for now will check some inflammatory markers and rheumatoid factor with her recent diagnosis will also place a rheumat Trelegy consult for evaluation  Discussed if triggering continues she may consider hand ortho  -     C-REACTIVE PROTEIN; Future  -     Sedimentation rate; Future  -     Rheumatoid Factor; Future  -     Ambulatory referral/consult to Rheumatology; Future  -     Lipid Panel; Future  -     TSH; Future  -     Hemoglobin A1C; Future    Hyperlipidemia, unspecified hyperlipidemia type  She is working on dietary measures is not interested in statins will update with next blood draw  -     Lipid Panel; Future  -     TSH; Future  -     Hemoglobin A1C; Future    Hyperglycemia  With next lab draw  -     Hemoglobin A1C; Future    Back pain, unspecified back location, unspecified back pain laterality, unspecified chronicity  Start with x-ray  -     X-Ray Thoracic " Spine AP Lateral; Future    De Quervain's disease (tenosynovitis)  Trigger finger, unspecified finger, unspecified laterality    Other ulcerative colitis without complication  Gerd   Recent diagnosis with nonspecific colitis she is following with GI and taking mesalamine currently    Anxiety  She is doing well tolerating Lexapro will maintain current dose will plan to continue     She will return for her annual in August she has her mammogram planned

## 2024-05-09 DIAGNOSIS — K51.019 ULCERATIVE PANCOLITIS WITH COMPLICATION: ICD-10-CM

## 2024-05-09 RX ORDER — MESALAMINE 0.38 G/1
1.5 CAPSULE, EXTENDED RELEASE ORAL DAILY
Qty: 360 CAPSULE | Refills: 1 | Status: SHIPPED | OUTPATIENT
Start: 2024-05-09 | End: 2024-06-03 | Stop reason: SDUPTHER

## 2024-05-13 ENCOUNTER — PATIENT MESSAGE (OUTPATIENT)
Dept: GASTROENTEROLOGY | Facility: CLINIC | Age: 64
End: 2024-05-13
Payer: MEDICARE

## 2024-05-14 DIAGNOSIS — K52.9 IBD (INFLAMMATORY BOWEL DISEASE): Primary | ICD-10-CM

## 2024-05-14 RX ORDER — MESALAMINE 0.38 G/1
1.5 CAPSULE, EXTENDED RELEASE ORAL DAILY
Qty: 360 CAPSULE | Refills: 1 | Status: SHIPPED | OUTPATIENT
Start: 2024-05-14 | End: 2024-06-03 | Stop reason: SDUPTHER

## 2024-05-30 ENCOUNTER — HOSPITAL ENCOUNTER (OUTPATIENT)
Dept: RADIOLOGY | Facility: HOSPITAL | Age: 64
Discharge: HOME | End: 2024-05-30
Attending: INTERNAL MEDICINE
Payer: MEDICARE

## 2024-05-30 DIAGNOSIS — K51.919 ULCERATIVE COLITIS WITH COMPLICATION, UNSPECIFIED LOCATION: ICD-10-CM

## 2024-05-30 LAB
CREAT SERPL-MCNC: 0.6 MG/DL (ref 0.5–1.4)
SAMPLE: NORMAL

## 2024-05-30 PROCEDURE — A9698 NON-RAD CONTRAST MATERIALNOC: HCPCS | Performed by: INTERNAL MEDICINE

## 2024-05-30 PROCEDURE — 25500020 PHARM REV CODE 255: Performed by: INTERNAL MEDICINE

## 2024-05-30 PROCEDURE — 74177 CT ABD & PELVIS W/CONTRAST: CPT | Mod: TC

## 2024-05-30 PROCEDURE — 74177 CT ABD & PELVIS W/CONTRAST: CPT | Mod: 26,,, | Performed by: INTERNAL MEDICINE

## 2024-05-30 RX ADMIN — BARIUM SULFATE 450 ML: 1 SUSPENSION ORAL at 06:05

## 2024-05-30 RX ADMIN — IOHEXOL 100 ML: 350 INJECTION, SOLUTION INTRAVENOUS at 04:05

## 2024-05-31 ENCOUNTER — PATIENT MESSAGE (OUTPATIENT)
Dept: GASTROENTEROLOGY | Facility: CLINIC | Age: 64
End: 2024-05-31
Payer: MEDICARE

## 2024-05-31 ENCOUNTER — TELEPHONE (OUTPATIENT)
Dept: INTERNAL MEDICINE | Facility: CLINIC | Age: 64
End: 2024-05-31
Payer: MEDICARE

## 2024-05-31 DIAGNOSIS — R59.0 MESENTERIC LYMPHADENOPATHY: ICD-10-CM

## 2024-05-31 DIAGNOSIS — K65.4 MESENTERIC PANNICULITIS: ICD-10-CM

## 2024-05-31 DIAGNOSIS — K65.4 MESENTERIC PANNICULITIS: Primary | ICD-10-CM

## 2024-05-31 DIAGNOSIS — I88.0 MESENTERIC ADENITIS: Primary | ICD-10-CM

## 2024-06-03 DIAGNOSIS — K51.019 ULCERATIVE PANCOLITIS WITH COMPLICATION: ICD-10-CM

## 2024-06-03 DIAGNOSIS — K52.9 IBD (INFLAMMATORY BOWEL DISEASE): ICD-10-CM

## 2024-06-03 RX ORDER — MESALAMINE 0.38 G/1
1.5 CAPSULE, EXTENDED RELEASE ORAL DAILY
Qty: 360 CAPSULE | Refills: 1 | Status: SHIPPED | OUTPATIENT
Start: 2024-06-03 | End: 2024-11-30

## 2024-06-10 ENCOUNTER — PATIENT MESSAGE (OUTPATIENT)
Dept: INTERNAL MEDICINE | Facility: CLINIC | Age: 64
End: 2024-06-10
Payer: MEDICARE

## 2024-06-13 ENCOUNTER — OFFICE VISIT (OUTPATIENT)
Dept: SURGERY | Facility: CLINIC | Age: 64
End: 2024-06-13
Payer: MEDICARE

## 2024-06-13 DIAGNOSIS — R59.0 MESENTERIC LYMPHADENOPATHY: ICD-10-CM

## 2024-06-13 DIAGNOSIS — K65.4 MESENTERIC PANNICULITIS: ICD-10-CM

## 2024-06-13 DIAGNOSIS — K90.49 FOOD INTOLERANCE IN ADULT: Primary | ICD-10-CM

## 2024-06-13 PROCEDURE — 3044F HG A1C LEVEL LT 7.0%: CPT | Mod: CPTII,95,, | Performed by: SURGERY

## 2024-06-13 PROCEDURE — 1159F MED LIST DOCD IN RCRD: CPT | Mod: CPTII,95,, | Performed by: SURGERY

## 2024-06-13 PROCEDURE — 1160F RVW MEDS BY RX/DR IN RCRD: CPT | Mod: CPTII,95,, | Performed by: SURGERY

## 2024-06-13 PROCEDURE — 99204 OFFICE O/P NEW MOD 45 MIN: CPT | Mod: 95,,, | Performed by: SURGERY

## 2024-06-13 NOTE — PROGRESS NOTES
"The patient location is: home  The chief complaint leading to consultation is: mesenteric adenitis    Visit type: audiovisual      25 minutes of total time spent on the encounter, which includes face to face time and non-face to face time preparing to see the patient (eg, review of tests), Obtaining and/or reviewing separately obtained history, Documenting clinical information in the electronic or other health record, Independently interpreting results (not separately reported) and communicating results to the patient/family/caregiver, or Care coordination (not separately reported).         Each patient to whom he or she provides medical services by telemedicine is:  (1) informed of the relationship between the physician and patient and the respective role of any other health care provider with respect to management of the patient; and (2) notified that he or she may decline to receive medical services by telemedicine and may withdraw from such care at any time.    Notes:     History & Physical    SUBJECTIVE:     History of Present Illness:  Patient is a 64 y.o. female presents with bmi 31.8/height 5'6"/weight 197, hld, aortic atherosclerosis, insulin resistance, s/p umbilical hernia, s/p tita, barretts, s/p lap moderate hh with nissen by me 2016 and diarrhea.  She has chronic episodic diarrhea and has right sided/back pain.  She denies heartburn and reflux since the surgery.  She is on pepcid.  She has weight loss with dieting (weight watchers).  She denies fevers and sweats.     No chief complaint on file.      Review of patient's allergies indicates:   Allergen Reactions    Penicillins Rash       Current Outpatient Medications   Medication Sig Dispense Refill    EScitalopram oxalate (LEXAPRO) 10 MG tablet Take 1 tablet (10 mg total) by mouth once daily. 30 tablet 11    famotidine (PEPCID) 20 MG tablet TAKE TWO TABLETS BY MOUTH DAILY IN THE EVENING. 180 tablet 1    ferrous gluconate (FERGON) 324 MG tablet Take 1 " tablet (324 mg total) by mouth daily with breakfast. 90 tablet 1    fluticasone propionate (FLONASE) 50 mcg/actuation nasal spray 1 spray by Each Nostril route daily as needed.      mesalamine (APRISO) 0.375 gram Cp24 Take 4 capsules (1.5 g total) by mouth once daily. 360 capsule 1    mesalamine (APRISO) 0.375 gram Cp24 Take 4 capsules (1.5 g total) by mouth once daily. 360 capsule 1    metoprolol tartrate (LOPRESSOR) 25 MG tablet Take 1 tablet (25 mg total) by mouth 2 (two) times daily. 90 tablet 3     No current facility-administered medications for this visit.       Past Medical History:   Diagnosis Date    Allergy     Brito's esophagus     Basal cell cancer     Cataract     Diverticulosis     Edema     chronic right foot     Hiatal hernia 1/31/2017    IBS (irritable bowel syndrome)     Obesity     Rectovaginal fistula 6/28/2012    Vitamin D deficiency disease      Past Surgical History:   Procedure Laterality Date    CHOLECYSTECTOMY      COLONOSCOPY      COLONOSCOPY N/A 7/11/2022    Procedure: COLONOSCOPY;  Surgeon: Carlos Manuel Barber MD;  Location: [address]);  Service: Endoscopy;  Laterality: N/A;  golytely    COLONOSCOPY N/A 4/16/2024    Procedure: COLONOSCOPY;  Surgeon: Carlos Manuel Barber MD;  [address]  Service: Endoscopy;  Laterality: N/A;    ESOPHAGOGASTRODUODENOSCOPY      ESOPHAGOGASTRODUODENOSCOPY N/A 9/18/2019    Procedure: EGD (ESOPHAGOGASTRODUODENOSCOPY);  Surgeon: Carlos Manuel Barber MD;  [address]  Service: Endoscopy;  Laterality: N/A;  evaluate fundoplication    ESOPHAGOGASTRODUODENOSCOPY N/A 7/11/2022    Procedure: EGD (ESOPHAGOGASTRODUODENOSCOPY);  Surgeon: Carlos Manuel Barber MD;  [facility]  Service: Endoscopy;  Laterality: N/A;  Covid test 7/8 [facility], instructions sent to [facility]    ESOPHAGOGASTRODUODENOSCOPY N/A 4/16/2024    Procedure: EGD (ESOPHAGOGASTRODUODENOSCOPY);  Surgeon: Carlos Manuel Barber MD;  Location: [facility]  [facility] (4TH FLR);  Service: Endoscopy;  Laterality: N/A;  Suprep  Referral Dr. Barber  prep instructions given to pt in clinic  -precall complete-MS    INGUINAL HERNIA REPAIR      LAPAROSCOPIC NISSEN FUNDOPLICATION  2/15/16    Mohs' procedure of face      RECTO VAGINAL FISTULA  ,     UMBILICAL HERNIA REPAIR       Family History   Problem Relation Name Age of Onset    Miscarriages / Stillbirths Mother      Diabetes Mother      Hypertension Mother      Glaucoma Mother      Cataracts Mother      Arthritis Father          Rhematoid    Hyperlipidemia Father      Cataracts Father      Glaucoma Father      Heart disease Father      No Known Problems Sister      No Known Problems Brother      Heart disease Maternal Grandmother      No Known Problems Maternal Grandfather      No Known Problems Paternal Grandmother      No Known Problems Paternal Grandfather      Heart disease Maternal Aunt      No Known Problems Maternal Uncle      No Known Problems Paternal Aunt      No Known Problems Paternal Uncle      Cancer Daughter daughter         amyloidosis    No Known Problems Son      Breast cancer Neg Hx      Colon cancer Neg Hx      Ovarian cancer Neg Hx       Social History     Tobacco Use    Smoking status: Former     Current packs/day: 0.00     Average packs/day: 0.3 packs/day for 5.0 years (1.3 ttl pk-yrs)     Types: Cigarettes     Start date: 1978     Quit date: 1983     Years since quittin.8    Smokeless tobacco: Never   Substance Use Topics    Alcohol use: Yes     Alcohol/week: 1.0 standard drink of alcohol     Types: 1 Glasses of wine per week     Comment: Social    Drug use: No        Review of Systems:  Review of Systems    OBJECTIVE:     Vital Signs (Most Recent)              Physical Exam:  Physical Exam    Laboratory  CBC: Reviewed  CMP: Reviewed  Lipids, A1c: Reviewed, hld, insulin resistance    Diagnostic Results:  Ct  reviewed, films viewed, moderate recurrent hh, mesenteric  panniculitis/adenitis which is under the small bowel in the left mid to upper abdomen (unchanged from years ago), other findings in report  Egd 2024 reviewed, 2cm hh (new from 2022 egd), otherwise normal    ASSESSMENT/PLAN:     Mesenteric panniculitis     PLAN:       For robotic mesenteric/LN biopsy, possible small bowel biopsy or resection (will call to make sure we send it appropriately).  Pcp clearance, possible outpatient unless we biopsy or resect small bowel.  May need rheumatology or hemeonc consult.

## 2024-06-13 NOTE — LETTER
June 13, 2024        Geena Roman MD  [address]  [facility] [address]             [facility]  [address]  [facility] [address]  Phone: [phone]   Patient: Rosanna Live   MR Number: [id number]   YOB: 1960   Date of Visit: 6/13/2024       Dear Dr. Roman:    Thank you for referring Rosanna Live to me for evaluation. Attached you will find relevant portions of my assessment and plan of care.    If you have questions, please do not hesitate to call me. I look forward to following Rosanna Live along with you.    Sincerely,      Sanket Gallagher MD            CC    No Recipients    Enclosure

## 2024-06-19 ENCOUNTER — PATIENT MESSAGE (OUTPATIENT)
Dept: SURGERY | Facility: CLINIC | Age: 64
End: 2024-06-19
Payer: MEDICARE

## 2024-06-19 DIAGNOSIS — Z01.818 PRE-OP TESTING: Primary | ICD-10-CM

## 2024-06-19 DIAGNOSIS — R59.0 MESENTERIC LYMPHADENOPATHY: Primary | ICD-10-CM

## 2024-06-19 NOTE — TELEPHONE ENCOUNTER
Spoke with patient.  Picked Wednesday August 7th for surgery  Reviewed items needed - labs, EKG, PCP clearance.  Will send pre and post op instructions in portal message.  Pt verbalized understanding to all and has no further questions at this time.

## 2024-06-20 ENCOUNTER — OFFICE VISIT (OUTPATIENT)
Dept: RHEUMATOLOGY | Facility: CLINIC | Age: 64
End: 2024-06-20
Payer: MEDICARE

## 2024-06-20 VITALS
SYSTOLIC BLOOD PRESSURE: 108 MMHG | WEIGHT: 196.56 LBS | BODY MASS INDEX: 31.59 KG/M2 | DIASTOLIC BLOOD PRESSURE: 66 MMHG | HEIGHT: 66 IN | HEART RATE: 61 BPM

## 2024-06-20 DIAGNOSIS — K51.80 OTHER ULCERATIVE COLITIS WITHOUT COMPLICATION: ICD-10-CM

## 2024-06-20 DIAGNOSIS — M25.50 ARTHRALGIA, UNSPECIFIED JOINT: Primary | ICD-10-CM

## 2024-06-20 DIAGNOSIS — K65.4 MESENTERIC PANNICULITIS: ICD-10-CM

## 2024-06-20 DIAGNOSIS — K55.9 ISCHEMIC COLITIS: ICD-10-CM

## 2024-06-20 PROCEDURE — 3078F DIAST BP <80 MM HG: CPT | Mod: CPTII,S$GLB,, | Performed by: STUDENT IN AN ORGANIZED HEALTH CARE EDUCATION/TRAINING PROGRAM

## 2024-06-20 PROCEDURE — 1101F PT FALLS ASSESS-DOCD LE1/YR: CPT | Mod: CPTII,S$GLB,, | Performed by: STUDENT IN AN ORGANIZED HEALTH CARE EDUCATION/TRAINING PROGRAM

## 2024-06-20 PROCEDURE — 3008F BODY MASS INDEX DOCD: CPT | Mod: CPTII,S$GLB,, | Performed by: STUDENT IN AN ORGANIZED HEALTH CARE EDUCATION/TRAINING PROGRAM

## 2024-06-20 PROCEDURE — 1159F MED LIST DOCD IN RCRD: CPT | Mod: CPTII,S$GLB,, | Performed by: STUDENT IN AN ORGANIZED HEALTH CARE EDUCATION/TRAINING PROGRAM

## 2024-06-20 PROCEDURE — 99205 OFFICE O/P NEW HI 60 MIN: CPT | Mod: S$GLB,,, | Performed by: STUDENT IN AN ORGANIZED HEALTH CARE EDUCATION/TRAINING PROGRAM

## 2024-06-20 PROCEDURE — 99999 PR PBB SHADOW E&M-EST. PATIENT-LVL III: CPT | Mod: PBBFAC,,, | Performed by: STUDENT IN AN ORGANIZED HEALTH CARE EDUCATION/TRAINING PROGRAM

## 2024-06-20 PROCEDURE — 3074F SYST BP LT 130 MM HG: CPT | Mod: CPTII,S$GLB,, | Performed by: STUDENT IN AN ORGANIZED HEALTH CARE EDUCATION/TRAINING PROGRAM

## 2024-06-20 PROCEDURE — 3044F HG A1C LEVEL LT 7.0%: CPT | Mod: CPTII,S$GLB,, | Performed by: STUDENT IN AN ORGANIZED HEALTH CARE EDUCATION/TRAINING PROGRAM

## 2024-06-20 PROCEDURE — 3288F FALL RISK ASSESSMENT DOCD: CPT | Mod: CPTII,S$GLB,, | Performed by: STUDENT IN AN ORGANIZED HEALTH CARE EDUCATION/TRAINING PROGRAM

## 2024-06-20 NOTE — PROGRESS NOTES
"     RHEUMATOLOGY OUTPATIENT CLINIC NOTE    6/20/2024    Attending Rheumatologist: Helena Watkins  Primary Care Provider: Geena Roman MD   Physician Requesting Consultation: Geena Roman MD  [address] [place] [address]  Chief Complaint/Reason For Consultation:  Joint Pain      Subjective:       HPI  Rosanna Live is a 64 y.o. 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     She has history of gill's esophagus in the past. She is on daily Pepcid as she is intolerant to PPI.   In 7/2022 had findings of ischemic colitis in colonoscopy. In late 2022, she started having RUQ and epigastric pain. CT enterography at the time showed Mesenteric stranding and prominent mesenteric lymph nodes, similar to prior exam. Findings could suggest mesenteric adenitis. Colonoscopy on 4/2024 showed findings not definitive for inflammatory bowel disease but not consistent with ischemic colitis. She was started on mesalamine tablets and plan is to follow up colonoscopy in 6 months.   She has repeated CT enterography on 5/30 that showed findings of " Similar appearance of the mesentery with mesenteric stranding/ana mesentery sign, unchanged from 12/14/2016. Findings are nonspecific, but may suggest mesenteric panniculitis. Prominent mesenteric lymph nodes, unchanged from 2016. No retroperitoneal adenopathy. "   She was referred to general surgery for lymph node biopsy which is scheduled for 8/7/24.   She was referred to rheumatology for further evaluation of mesenteric panniculitis.     Reports Left 3rd trigger finger for the past month, it can be very painful at times. She feels like the right 3rd finger is starting to trigger.   Reports numbness in hands and feet in the morning, improves after moving the hand   Reports morning stiffness for about 1 hour  Reports pain in bilateral knees, right worse than left, worse when climbing stairs, no swelling. "   Reports swelling in bilateral ankles and erythema after prolonged walking.     +dysphagia - hx of esophageal dilation many years ago.     No unintentional weight loss. Doing WW.     No malar rash,photosensitivity   No telangiectasias   No calcinosis   No patchy alopecia   No oral and nasal ulcers   No dry eyes   Has dry mouth + intermittent.   No pleurisy or any cardiopulmonary complaints   No dysphagia,diplopia and dysphonia and muscle weakness   No raynaud's   No digital ulcers   No cytopenias   No renal issues   No blood clots - prior hx of ischemic colitis  No fever,chills,night sweats,weight loss and loss of appetite   No new onset headaches   No recurrent conjunctivitis or uveitis or scleritis or episcleritis   No swollen glands  No seizures,strokes,psychosis  No sclerodactyly  No puffy hands  No perioral tightness      She is retired. Used to work as a .   Quit smoking 40 years ago.     Mother with MG, father with RA, daughter with amyloidosis.     Review of Systems   Constitutional:  Negative for fever and unexpected weight change.   HENT:  Negative for mouth sores and trouble swallowing.    Eyes:  Negative for redness.   Respiratory:  Negative for cough and shortness of breath.    Cardiovascular:  Negative for chest pain.   Gastrointestinal:  Positive for diarrhea. Negative for constipation.   Genitourinary:  Negative for dysuria and genital sores.   Integumentary:  Positive for rash.   Neurological:  Negative for headaches.   Hematological:  Bruises/bleeds easily.        Chronic comorbid conditions affecting medical decision making today:  Past Medical History:   Diagnosis Date    Allergy     Brito's esophagus     Basal cell cancer     Cataract     Diverticulosis     Edema     chronic right foot     Hiatal hernia 1/31/2017    IBS (irritable bowel syndrome)     Obesity     Rectovaginal fistula 6/28/2012    Vitamin D deficiency disease      Past Surgical History:   Procedure Laterality Date     CHOLECYSTECTOMY      COLONOSCOPY      COLONOSCOPY N/A 7/11/2022    Procedure: COLONOSCOPY;  Surgeon: Carlos Manuel Barber MD;  Location: [facility] (4TH FLR);  Service: Endoscopy;  Laterality: N/A;  golytely    COLONOSCOPY N/A 4/16/2024    Procedure: COLONOSCOPY;  Surgeon: Carlos Manuel Barber MD;  Location: [facility] ENDO (4TH FLR);  Service: Endoscopy;  Laterality: N/A;    ESOPHAGOGASTRODUODENOSCOPY      ESOPHAGOGASTRODUODENOSCOPY N/A 9/18/2019    Procedure: EGD (ESOPHAGOGASTRODUODENOSCOPY);  Surgeon: Carlos Manuel Barber MD;  Location: [facility] ENDO (4TH FLR);  Service: Endoscopy;  Laterality: N/A;  evaluate fundoplication    ESOPHAGOGASTRODUODENOSCOPY N/A 7/11/2022    Procedure: EGD (ESOPHAGOGASTRODUODENOSCOPY);  Surgeon: Carlos Manuel Barber MD;  Location: [facility] (4TH FLR);  Service: Endoscopy;  Laterality: N/A;  Covid test 7/8 [facility], instructions sent to [facility]    ESOPHAGOGASTRODUODENOSCOPY N/A 4/16/2024    Procedure: EGD (ESOPHAGOGASTRODUODENOSCOPY);  Surgeon: Carlos Manuel Barber MD;  Location: [facility] (4TH FLR);  Service: Endoscopy;  Laterality: N/A;  Suprep  Referral Dr. Barber  prep instructions given to pt in clinic  4/11-precall complete-MS    INGUINAL HERNIA REPAIR      LAPAROSCOPIC NISSEN FUNDOPLICATION  2/15/16    Mohs' procedure of face      RECTO VAGINAL FISTULA  2011, 2012    UMBILICAL HERNIA REPAIR       Family History   Problem Relation Name Age of Onset    Miscarriages / Stillbirths Mother      Diabetes Mother      Hypertension Mother      Glaucoma Mother      Cataracts Mother      Arthritis Father          Rhematoid    Hyperlipidemia Father      Cataracts Father      Glaucoma Father      Heart disease Father      No Known Problems Sister      No Known Problems Brother      Heart disease Maternal Grandmother      No Known Problems Maternal Grandfather      No Known Problems Paternal Grandmother      No Known Problems Paternal Grandfather      Heart disease Maternal Aunt      No Known Problems Maternal  Uncle      No Known Problems Paternal Aunt      No Known Problems Paternal Uncle      Cancer Daughter daughter         amyloidosis    No Known Problems Son      Breast cancer Neg Hx      Colon cancer Neg Hx      Ovarian cancer Neg Hx       Social History     Substance and Sexual Activity   Alcohol Use Yes    Alcohol/week: 1.0 standard drink of alcohol    Types: 1 Glasses of wine per week    Comment: Social     Social History     Tobacco Use   Smoking Status Former    Current packs/day: 0.00    Average packs/day: 0.3 packs/day for 5.0 years (1.3 ttl pk-yrs)    Types: Cigarettes    Start date: 1978    Quit date: 1983    Years since quittin.8   Smokeless Tobacco Never     Social History     Substance and Sexual Activity   Drug Use No       Current Outpatient Medications:     EScitalopram oxalate (LEXAPRO) 10 MG tablet, Take 1 tablet (10 mg total) by mouth once daily., Disp: 30 tablet, Rfl: 11    famotidine (PEPCID) 20 MG tablet, TAKE TWO TABLETS BY MOUTH DAILY IN THE EVENING., Disp: 180 tablet, Rfl: 1    ferrous gluconate (FERGON) 324 MG tablet, Take 1 tablet (324 mg total) by mouth daily with breakfast., Disp: 90 tablet, Rfl: 1    mesalamine (APRISO) 0.375 gram Cp24, Take 4 capsules (1.5 g total) by mouth once daily., Disp: 360 capsule, Rfl: 1    metoprolol tartrate (LOPRESSOR) 25 MG tablet, Take 1 tablet (25 mg total) by mouth 2 (two) times daily., Disp: 90 tablet, Rfl: 3    mesalamine (APRISO) 0.375 gram Cp24, Take 4 capsules (1.5 g total) by mouth once daily., Disp: 360 capsule, Rfl: 1     Objective:         Vitals:    24 0900   BP: 108/66   Pulse: 61     Physical Exam   Constitutional: She is oriented to person, place, and time. She appears well-developed.   HENT:   Head: Normocephalic.   Mouth/Throat: Mucous membranes are moist.   Cardiovascular: Normal rate and normal heart sounds.   Pulmonary/Chest: Effort normal and breath sounds normal.   Abdominal: Soft. She exhibits no distension and no  "mass. There is no abdominal tenderness.   Musculoskeletal:      Cervical back: Neck supple.      Comments: Left 3rd flexor retinaculum with nodule and tenderness  No synovitis noted  Bilateral knees with crepitus, no instability, no joint line tenderness  Bilateral ankles with soft tissue swelling, no synovitis.  Negative MTP squeeze    Lymphadenopathy:     She has no cervical adenopathy.   Neurological: She is alert and oriented to person, place, and time.   Skin: No rash noted.   No Heliotrope rash  No Gottron papules  No sclerodactyly   No Raynaud's  No Petechiae  No discoid lesions  No alopecia  Normal Capillaroscopy   Vitals reviewed.      Reviewed old and all outside pertinent medical records available.    All lab results personally reviewed and interpreted by me.  Lab Results   Component Value Date    WBC 9.42 03/01/2024    HGB 13.2 03/01/2024    HCT 41.7 03/01/2024    MCV 93 03/01/2024    MCH 29.3 03/01/2024    MCHC 31.7 (L) 03/01/2024    RDW 12.5 03/01/2024     03/01/2024    MPV 9.6 03/01/2024       Lab Results   Component Value Date     05/30/2024    K 3.9 05/30/2024     05/30/2024    CO2 22 (L) 05/30/2024    GLU 92 05/30/2024    BUN 12 05/30/2024    CALCIUM 9.6 05/30/2024    PROT 7.1 05/30/2024    ALBUMIN 3.7 05/30/2024    BILITOT 0.5 05/30/2024    AST 15 05/30/2024    ALKPHOS 94 05/30/2024    ALT 16 05/30/2024       Lab Results   Component Value Date    COLORU Straw 08/17/2020    APPEARANCEUA Clear 08/17/2020    SPECGRAV 1.005 08/17/2020    PHUR 6.0 08/17/2020    PROTEINUA Negative 08/17/2020    KETONESU Negative 08/17/2020    LEUKOCYTESUR Negative 08/17/2020    NITRITE Negative 08/17/2020    UROBILINOGEN Negative 12/11/2014       Lab Results   Component Value Date    CRP 5.1 05/30/2024       Lab Results   Component Value Date    RF <13.0 05/30/2024    SEDRATE 40 (H) 05/30/2024       No components found for: "25OHVITDTOT", "52LHVSQT9", "42PIGLVD5", "METHODNOTE"    No results found for: " ""URICACID"    Lab Results   Component Value Date    HEPBSAG Negative 02/14/2017    HEPCAB Negative 02/14/2017    HEPCAB Negative 08/20/2015       Imaging:  All imaging reviewed and independently interpreted by me.     ASSESSMENT / PLAN:     Rosanna Live is a 64 y.o. White female with:    1. Mesenteric panniculitis    Mesenteric stranding and prominent mesenteric lymph nodes, stable since 2016.    Extensive review of system is essentially negative with exception of joint pain.    Doubt malignancy as lesions have been stable for many years.    She is asymptomatic - no unintentional weight loss, abdominal pain, signs of obstruction.   2. Hx of ischemic colitis  3. Ulcerative colitis on mesalamine  4. Arthralgia   Hand and feet pain with no swelling but prolonged morning stiffness    Bilateral knee pain, + crepitus - likely OA  5. Left trigger finger.     Plan  -Obtain autoimmune workup for mesenteric panniculitis now  -Follow up LN biopsy planned for 8/7 by Dr. Gallagher - send stains for IgG4, AFB. Evaluate for lymphoma and other malignancies too   -XR of bilateral knees  -Consider left trigger finger injection during next visit     Follow up in about 7 weeks (around 8/8/2024). After LN biopsy     Method of contact with patient concerns: MyChart attn Rheumatology    Disclaimer:  This note is prepared using voice recognition software and as such is likely to have errors and has not been proof read. Please contact me for questions.     Time spent: 60 minutes in face to face discussion concerning diagnosis, prognosis, review of lab and test results, benefits of treatment as well as management of disease, counseling of patient and coordination of care between various health care providers.  Greater than half the time spent was used for coordination of care and counseling of patient.    Helena Watkins M.D.  Rheumatology  [facility]    "

## 2024-06-20 NOTE — PROGRESS NOTES
6/20/2024     8:50 AM   Rapid3 Question Responses and Scores   MDHAQ Score 0   Psychologic Score 1.1   Pain Score 0.5   When you awakened in the morning OVER THE LAST WEEK, did you feel stiff? Yes   If Yes, please indicate the number of hours until you are as limber as you will be for the day 1   Fatigue Score 0.5   Global Health Score 0.5   RAPID3 Score 0.33

## 2024-06-28 ENCOUNTER — TELEPHONE (OUTPATIENT)
Dept: SURGERY | Facility: CLINIC | Age: 64
End: 2024-06-28
Payer: MEDICARE

## 2024-06-29 ENCOUNTER — PATIENT MESSAGE (OUTPATIENT)
Dept: GASTROENTEROLOGY | Facility: CLINIC | Age: 64
End: 2024-06-29
Payer: MEDICARE

## 2024-06-29 DIAGNOSIS — Z23 ENCOUNTER FOR VACCINATION: Primary | ICD-10-CM

## 2024-07-01 ENCOUNTER — TELEPHONE (OUTPATIENT)
Dept: INTERNAL MEDICINE | Facility: CLINIC | Age: 64
End: 2024-07-01
Payer: MEDICARE

## 2024-07-01 NOTE — TELEPHONE ENCOUNTER
----- Message from Polina Herzog RN sent at 6/28/2024  4:11 PM CDT -----  Regarding: Medcial Clearance  Dr. Gallagher is requesting medical clearance for the following:    Date of Procedure: August 7th  Anesthesia:  General  Procedure:  Robotic mesenteric Lymph Node Biopsy with Possible Small Bowel Biopsy    We are requesting a medical clearance for this patient.  Please kindly contact patient to schedule this appointment.  Patient's most recent labs are available in EPIC but she still needs an EKG at this time, but it has been ordered.  Please let me know if you need anything else from me.    Thank you,  Polina Herzog, RN  Nurse Navigator - General Surgery  Ext [phone]

## 2024-07-02 ENCOUNTER — PATIENT OUTREACH (OUTPATIENT)
Dept: ADMINISTRATIVE | Facility: HOSPITAL | Age: 64
End: 2024-07-02
Payer: MEDICARE

## 2024-07-05 ENCOUNTER — PATIENT MESSAGE (OUTPATIENT)
Dept: GASTROENTEROLOGY | Facility: CLINIC | Age: 64
End: 2024-07-05
Payer: MEDICARE

## 2024-07-10 ENCOUNTER — OFFICE VISIT (OUTPATIENT)
Dept: URGENT CARE | Facility: CLINIC | Age: 64
End: 2024-07-10
Payer: MEDICARE

## 2024-07-10 VITALS
SYSTOLIC BLOOD PRESSURE: 122 MMHG | RESPIRATION RATE: 17 BRPM | BODY MASS INDEX: 31.66 KG/M2 | HEIGHT: 66 IN | WEIGHT: 197 LBS | DIASTOLIC BLOOD PRESSURE: 80 MMHG | OXYGEN SATURATION: 95 % | HEART RATE: 65 BPM | TEMPERATURE: 98 F

## 2024-07-10 DIAGNOSIS — K22.70 BARRETT'S ESOPHAGUS WITHOUT DYSPLASIA: ICD-10-CM

## 2024-07-10 DIAGNOSIS — J20.9 ACUTE BRONCHITIS, UNSPECIFIED ORGANISM: ICD-10-CM

## 2024-07-10 DIAGNOSIS — E78.5 HYPERLIPIDEMIA, UNSPECIFIED HYPERLIPIDEMIA TYPE: ICD-10-CM

## 2024-07-10 DIAGNOSIS — R05.9 COUGH, UNSPECIFIED TYPE: ICD-10-CM

## 2024-07-10 DIAGNOSIS — H65.191 ACUTE MUCOID OTITIS MEDIA OF RIGHT EAR: Primary | ICD-10-CM

## 2024-07-10 LAB
CTP QC/QA: YES
SARS-COV-2 AG RESP QL IA.RAPID: NEGATIVE

## 2024-07-10 PROCEDURE — 87811 SARS-COV-2 COVID19 W/OPTIC: CPT | Mod: QW,S$GLB,, | Performed by: PHYSICIAN ASSISTANT

## 2024-07-10 PROCEDURE — 99214 OFFICE O/P EST MOD 30 MIN: CPT | Mod: S$GLB,,, | Performed by: PHYSICIAN ASSISTANT

## 2024-07-10 RX ORDER — AZITHROMYCIN 250 MG/1
TABLET, FILM COATED ORAL
Qty: 6 TABLET | Refills: 0 | Status: SHIPPED | OUTPATIENT
Start: 2024-07-10 | End: 2024-07-15

## 2024-07-10 RX ORDER — PREDNISONE 20 MG/1
40 TABLET ORAL DAILY
Qty: 8 TABLET | Refills: 0 | Status: SHIPPED | OUTPATIENT
Start: 2024-07-10 | End: 2024-07-14

## 2024-07-10 RX ORDER — ALBUTEROL SULFATE 90 UG/1
2 AEROSOL, METERED RESPIRATORY (INHALATION) EVERY 6 HOURS PRN
Qty: 6.7 G | Refills: 0 | Status: SHIPPED | OUTPATIENT
Start: 2024-07-10

## 2024-07-10 RX ORDER — PROMETHAZINE HYDROCHLORIDE AND DEXTROMETHORPHAN HYDROBROMIDE 6.25; 15 MG/5ML; MG/5ML
5 SYRUP ORAL EVERY 4 HOURS PRN
Qty: 118 ML | Refills: 0 | Status: SHIPPED | OUTPATIENT
Start: 2024-07-10 | End: 2024-07-20

## 2024-07-10 NOTE — PROGRESS NOTES
"Subjective:      Patient ID: Rosanna Live is a 64 y.o. female.    Vitals:  height is 5' 6" (1.676 m) and weight is 89.4 kg (197 lb). Her oral temperature is 98.3 °F (36.8 °C). Her blood pressure is 122/80 and her pulse is 65. Her respiration is 17 and oxygen saturation is 95%.     Chief Complaint: Cough    Patient provider note starts here:    Patient presents to the clinic with complaints of cough, wheezing especially at nighttime, right-sided otalgia and postnasal drip.  Reports that symptoms started about 4 days ago.  Denies any chest pain or shortness of breath.  She does not smoke tobacco and has no history of chronic lung disease.    Cough  This is a new problem. Episode onset: Sat. The problem has been unchanged. The problem occurs constantly. The cough is Productive of sputum. Associated symptoms include chills, ear congestion, ear pain, myalgias, nasal congestion, postnasal drip, a sore throat, sweats and wheezing. Pertinent negatives include no chest pain, fever, headaches, heartburn, hemoptysis, rash, rhinorrhea, shortness of breath or weight loss. Nothing aggravates the symptoms. She has tried OTC cough suppressant for the symptoms. The treatment provided mild relief. There is no history of asthma, bronchiectasis, bronchitis, COPD, emphysema, environmental allergies or pneumonia.       Constitution: Positive for chills. Negative for fever.   HENT:  Positive for ear pain, congestion, postnasal drip and sore throat. Negative for ear discharge.    Neck: Negative for neck pain.   Cardiovascular:  Negative for chest pain, palpitations and sob on exertion.   Respiratory:  Positive for cough, sputum production and wheezing. Negative for chest tightness, bloody sputum, COPD, shortness of breath and asthma.    Gastrointestinal:  Negative for abdominal pain, vomiting, diarrhea and heartburn.   Musculoskeletal:  Positive for muscle ache.   Skin:  Negative for color change, rash and wound. "   Allergic/Immunologic: Negative for environmental allergies and asthma.   Neurological:  Negative for headaches, numbness and tingling.      Objective:     Physical Exam   Constitutional: She is oriented to person, place, and time. She appears well-developed. She is cooperative.  Non-toxic appearance. She does not appear ill. No distress.   HENT:   Head: Normocephalic and atraumatic.   Ears:   Right Ear: Hearing, tympanic membrane, external ear and ear canal normal.   Left Ear: Hearing, external ear and ear canal normal.      Comments: Mucoid effusion on behind the right TM. The TM is intact and there is no mastoid TTP.    Nose: Congestion present. No mucosal edema, rhinorrhea or nasal deformity. No epistaxis. Right sinus exhibits no maxillary sinus tenderness and no frontal sinus tenderness. Left sinus exhibits no maxillary sinus tenderness and no frontal sinus tenderness.   Mouth/Throat: Uvula is midline, oropharynx is clear and moist and mucous membranes are normal. No trismus in the jaw. Normal dentition. No uvula swelling. No oropharyngeal exudate, posterior oropharyngeal edema or posterior oropharyngeal erythema.   Eyes: Conjunctivae and lids are normal. No scleral icterus.   Neck: Trachea normal and phonation normal. Neck supple. No edema present. No erythema present. No neck rigidity present.   Cardiovascular: Normal rate, regular rhythm, normal heart sounds and normal pulses.   Pulmonary/Chest: Effort normal and breath sounds normal. No respiratory distress. She has no decreased breath sounds. She has no wheezes. She has no rhonchi.   Abdominal: Normal appearance.   Musculoskeletal: Normal range of motion.         General: No deformity. Normal range of motion.   Neurological: She is alert and oriented to person, place, and time. She exhibits normal muscle tone. Coordination normal.   Skin: Skin is warm, dry, intact, not diaphoretic and not pale.   Psychiatric: Her speech is normal and behavior is normal.  Judgment and thought content normal.   Nursing note and vitals reviewed.      Assessment:     1. Acute mucoid otitis media of right ear    2. Cough, unspecified type    3. Acute bronchitis, unspecified organism    4. Brito's esophagus without dysplasia    5. Hyperlipidemia, unspecified hyperlipidemia type      Results for orders placed or performed in visit on 07/10/24   SARS Coronavirus 2 Antigen, POCT Manual Read   Result Value Ref Range    SARS Coronavirus 2 Antigen Negative Negative     Acceptable Yes        Plan:       Acute mucoid otitis media of right ear  -     azithromycin (Z-ANABELL) 250 MG tablet; Take 2 tablets by mouth on day 1; Take 1 tablet by mouth on days 2-5  Dispense: 6 tablet; Refill: 0    Cough, unspecified type  -     SARS Coronavirus 2 Antigen, POCT Manual Read    Acute bronchitis, unspecified organism  -     predniSONE (DELTASONE) 20 MG tablet; Take 2 tablets (40 mg total) by mouth once daily. for 4 days  Dispense: 8 tablet; Refill: 0  -     albuterol (PROVENTIL HFA) 90 mcg/actuation inhaler; Inhale 2 puffs into the lungs every 6 (six) hours as needed for Shortness of Breath or Wheezing. Rescue  Dispense: 6.7 g; Refill: 0  -     promethazine-dextromethorphan (PROMETHAZINE-DM) 6.25-15 mg/5 mL Syrp; Take 5 mLs by mouth every 4 (four) hours as needed (COUGH).  Dispense: 118 mL; Refill: 0    Brito's esophagus without dysplasia    Hyperlipidemia, unspecified hyperlipidemia type          Medical Decision Making:   History:   Old Medical Records: I decided to obtain old medical records.  Clinical Tests:   Lab Tests: Ordered and Reviewed  Urgent Care Management:  A. Problem List:   -Acute: Acute bronchitis, otitis media    -Chronic: HLD, Rbito's esophagus   B. Differential diagnosis: viral vs bacterial URI, pharyngitis, otitis, COVID 19, influenza, pneumonia  C. Diagnostic Testing Ordered: COVID   D. Diagnostic Testing Considered: None  E. Independent Historians: None  F. Urgent Care  Midlevel Independent Results Interpretation: COVID negative   G. Radiology:  H. Review of Previous Medical Records:  I. Home Medications Reviewed  J. Social Determinants of Health considered  K. Medical Decision Making and Disposition:   Patient presents to the clinic with complaints of URI like symptoms with associated cough, wheezing, right-sided otalgia.  On exam, she is afebrile and nontoxic in appearance.  Lungs are clear to auscultation bilaterally at this time and her vital signs are stable.  Reports wheezing at nighttime.  She has a right mucoid effusion.  Advised close follow-up with primary care and strict ED precautions.  She verbalized understanding and agreed with this plan.       Additional MDM:     Heart Failure Score:   COPD = No        Patient Instructions   Thank you for choosing [facility]!     Our goal in the Urgent Care is to always provide outstanding medical care. You may receive a survey by mail or e-mail in the next week regarding your experience today. We would greatly appreciate you completing and returning the survey. Your feedback provides us with a way to recognize our staff who provide very good care, and it helps us learn how to improve when your experience was below our aspiration of excellence.       We appreciate you trusting us with your medical care. We hope you feel better soon. We will be happy to take care of you for all of your future medical needs.    You must understand that you've received an Urgent Care treatment only and that you may be released before all your medical problems are known or treated. You, the patient, will arrange for follow up care as instructed.      Follow up with your PCP or specialty clinic as instructed in the next 2-3 days if not improved or as needed. You can call [phone] to schedule an appointment with appropriate provider.      If you condition worsens, we recommend that you receive another evaluation at the emergency room  immediately or contact your primary medical clinic's after hours call service to discuss your concerns.      Please return here or go to the Emergency Department for any concerns or worsening condition.      You have been prescribed a steroid today. Take the prescription as directed. Steroids can increase blood sugar. You can also have the following when taking steroids: flushing, jitteriness, weight gain, fluid retention, bone weakening. If you develop any adverse symptoms, stop taking the medication immediately.

## 2024-07-10 NOTE — PATIENT INSTRUCTIONS
Thank you for choosing [facility]!     Our goal in the Urgent Care is to always provide outstanding medical care. You may receive a survey by mail or e-mail in the next week regarding your experience today. We would greatly appreciate you completing and returning the survey. Your feedback provides us with a way to recognize our staff who provide very good care, and it helps us learn how to improve when your experience was below our aspiration of excellence.       We appreciate you trusting us with your medical care. We hope you feel better soon. We will be happy to take care of you for all of your future medical needs.    You must understand that you've received an Urgent Care treatment only and that you may be released before all your medical problems are known or treated. You, the patient, will arrange for follow up care as instructed.      Follow up with your PCP or specialty clinic as instructed in the next 2-3 days if not improved or as needed. You can call [phone] to schedule an appointment with appropriate provider.      If you condition worsens, we recommend that you receive another evaluation at the emergency room immediately or contact your primary medical clinic's after hours call service to discuss your concerns.      Please return here or go to the Emergency Department for any concerns or worsening condition.      You have been prescribed a steroid today. Take the prescription as directed. Steroids can increase blood sugar. You can also have the following when taking steroids: flushing, jitteriness, weight gain, fluid retention, bone weakening. If you develop any adverse symptoms, stop taking the medication immediately.

## 2024-07-16 ENCOUNTER — HOSPITAL ENCOUNTER (OUTPATIENT)
Dept: RADIOLOGY | Facility: HOSPITAL | Age: 64
Discharge: HOME | End: 2024-07-16
Attending: INTERNAL MEDICINE
Payer: MEDICARE

## 2024-07-16 ENCOUNTER — OFFICE VISIT (OUTPATIENT)
Dept: INTERNAL MEDICINE | Facility: CLINIC | Age: 64
End: 2024-07-16
Payer: MEDICARE

## 2024-07-16 ENCOUNTER — HOSPITAL ENCOUNTER (OUTPATIENT)
Dept: CARDIOLOGY | Facility: CLINIC | Age: 64
Discharge: HOME | End: 2024-07-16
Payer: MEDICARE

## 2024-07-16 VITALS
RESPIRATION RATE: 18 BRPM | HEART RATE: 74 BPM | BODY MASS INDEX: 32.99 KG/M2 | OXYGEN SATURATION: 97 % | WEIGHT: 204.38 LBS | SYSTOLIC BLOOD PRESSURE: 117 MMHG | DIASTOLIC BLOOD PRESSURE: 70 MMHG

## 2024-07-16 DIAGNOSIS — R00.2 PALPITATIONS: ICD-10-CM

## 2024-07-16 DIAGNOSIS — Z01.818 PRE-OP TESTING: ICD-10-CM

## 2024-07-16 DIAGNOSIS — F41.9 ANXIETY: ICD-10-CM

## 2024-07-16 DIAGNOSIS — R05.9 COUGH, UNSPECIFIED TYPE: ICD-10-CM

## 2024-07-16 DIAGNOSIS — K51.90 ULCERATIVE COLITIS WITHOUT COMPLICATIONS, UNSPECIFIED LOCATION: ICD-10-CM

## 2024-07-16 DIAGNOSIS — R59.0 LYMPHADENOPATHY, MESENTERIC: Primary | ICD-10-CM

## 2024-07-16 DIAGNOSIS — Z01.818 PREOP EXAM FOR INTERNAL MEDICINE: ICD-10-CM

## 2024-07-16 LAB
OHS QRS DURATION: 92 MS
OHS QTC CALCULATION: 396 MS

## 2024-07-16 PROCEDURE — 3078F DIAST BP <80 MM HG: CPT | Mod: CPTII,S$GLB,, | Performed by: INTERNAL MEDICINE

## 2024-07-16 PROCEDURE — 99214 OFFICE O/P EST MOD 30 MIN: CPT | Mod: S$GLB,,, | Performed by: INTERNAL MEDICINE

## 2024-07-16 PROCEDURE — 1160F RVW MEDS BY RX/DR IN RCRD: CPT | Mod: CPTII,S$GLB,, | Performed by: INTERNAL MEDICINE

## 2024-07-16 PROCEDURE — 3008F BODY MASS INDEX DOCD: CPT | Mod: CPTII,S$GLB,, | Performed by: INTERNAL MEDICINE

## 2024-07-16 PROCEDURE — 93010 ELECTROCARDIOGRAM REPORT: CPT | Mod: S$GLB,,, | Performed by: INTERNAL MEDICINE

## 2024-07-16 PROCEDURE — 99999 PR PBB SHADOW E&M-EST. PATIENT-LVL IV: CPT | Mod: PBBFAC,,, | Performed by: INTERNAL MEDICINE

## 2024-07-16 PROCEDURE — 71046 X-RAY EXAM CHEST 2 VIEWS: CPT | Mod: 26,,, | Performed by: RADIOLOGY

## 2024-07-16 PROCEDURE — 93005 ELECTROCARDIOGRAM TRACING: CPT | Mod: S$GLB,,, | Performed by: SURGERY

## 2024-07-16 PROCEDURE — 3044F HG A1C LEVEL LT 7.0%: CPT | Mod: CPTII,S$GLB,, | Performed by: INTERNAL MEDICINE

## 2024-07-16 PROCEDURE — 1159F MED LIST DOCD IN RCRD: CPT | Mod: CPTII,S$GLB,, | Performed by: INTERNAL MEDICINE

## 2024-07-16 PROCEDURE — 3074F SYST BP LT 130 MM HG: CPT | Mod: CPTII,S$GLB,, | Performed by: INTERNAL MEDICINE

## 2024-07-16 PROCEDURE — 1101F PT FALLS ASSESS-DOCD LE1/YR: CPT | Mod: CPTII,S$GLB,, | Performed by: INTERNAL MEDICINE

## 2024-07-16 PROCEDURE — 3288F FALL RISK ASSESSMENT DOCD: CPT | Mod: CPTII,S$GLB,, | Performed by: INTERNAL MEDICINE

## 2024-07-16 PROCEDURE — 71046 X-RAY EXAM CHEST 2 VIEWS: CPT | Mod: TC

## 2024-07-16 NOTE — PROGRESS NOTES
Subjective:       Patient ID: Rosanna Live is a 64 y.o. female.    Chief Complaint: Pre-op Exam  64-year-old who presents today for follow-up she is planning on having upcoming surgery was diagnosed with ulcerative colitis and upon some additional testing was found to have some persistent lymph nodes is planning on getting a biopsy of nodes if possible.  She reports recently has been doing okay although she did have an upper respiratory infection outlying urgent Care was placed on steroids as she was having a bit of wheezing during that time symptoms did improve although she notes that she felt a lot better when taking the steroids as far as her joint pains. She is following with rheumatology her diarreha has been intermittant following with gi   She has been doing well with the Lexapro far as helping her anxiety and depression.    HPI  Review of Systems   Respiratory:          Recent uri improved   Cardiovascular:  Negative for chest pain.   Musculoskeletal:  Positive for arthralgias.   Psychiatric/Behavioral:          Anxiety stable        Objective:    Blood pressure 117/70, pulse 74, resp. rate 18, weight 92.7 kg (204 lb 5.9 oz), SpO2 97%.   Physical Exam  Constitutional:       General: She is not in acute distress.  HENT:      Head: Normocephalic.      Mouth/Throat:      Pharynx: Oropharynx is clear.   Cardiovascular:      Rate and Rhythm: Normal rate and regular rhythm.      Heart sounds: Normal heart sounds. No murmur heard.     No friction rub. No gallop.   Pulmonary:      Effort: Pulmonary effort is normal. No respiratory distress.      Breath sounds: Normal breath sounds.   Abdominal:      General: Bowel sounds are normal.      Palpations: Abdomen is soft. There is no mass.      Tenderness: There is no abdominal tenderness.   Musculoskeletal:      Comments: Stable edema right ankle    Skin:     Findings: No erythema.   Neurological:      Mental Status: She is alert.   Psychiatric:         Mood  "and Affect: Mood normal.         Assessment:       1. Lymphadenopathy, mesenteric    2. Cough, unspecified type    3. Preop exam for internal medicine    4. Palpitations    5. Ulcerative colitis without complications, unspecified location    6. Anxiety        Plan:       Rosanna Rolando "Rosanna" was seen today for pre-op exam.    Diagnoses and all orders for this visit:    Lymphadenopathy, mesenteric  Discussed patient has upcoming planned for surgical biopsy    Cough, unspecified type  Recent uri   Recent episode resolved with treatment will update baseline  -     X-Ray Chest PA And Lateral; Future    Preop exam for internal medicine    Palpitations  Stable EKG no changes     Ulcerative colitis without complications, unspecified location  Continues follow with GI    Anxiety  Doing well with Lexapro continue home stressors maintain her current regimen    Recent labs/EKG chest xray reviewed   Pt clear for anesthesia and surgical biopsy as planned  She will hold nsaids/asprin 5-7 days prior                 "

## 2024-07-29 ENCOUNTER — PATIENT MESSAGE (OUTPATIENT)
Dept: GASTROENTEROLOGY | Facility: CLINIC | Age: 64
End: 2024-07-29
Payer: MEDICARE

## 2024-08-05 ENCOUNTER — OFFICE VISIT (OUTPATIENT)
Dept: GASTROENTEROLOGY | Facility: CLINIC | Age: 64
End: 2024-08-05
Payer: MEDICARE

## 2024-08-05 DIAGNOSIS — K65.4 MESENTERIC PANNICULITIS: Primary | ICD-10-CM

## 2024-08-05 DIAGNOSIS — K22.70 BARRETT'S ESOPHAGUS WITHOUT DYSPLASIA: ICD-10-CM

## 2024-08-05 DIAGNOSIS — K52.9 COLITIS: ICD-10-CM

## 2024-08-05 PROCEDURE — 1160F RVW MEDS BY RX/DR IN RCRD: CPT | Mod: CPTII,95,, | Performed by: INTERNAL MEDICINE

## 2024-08-05 PROCEDURE — 3044F HG A1C LEVEL LT 7.0%: CPT | Mod: CPTII,95,, | Performed by: INTERNAL MEDICINE

## 2024-08-05 PROCEDURE — 1159F MED LIST DOCD IN RCRD: CPT | Mod: CPTII,95,, | Performed by: INTERNAL MEDICINE

## 2024-08-05 PROCEDURE — 99214 OFFICE O/P EST MOD 30 MIN: CPT | Mod: 95,,, | Performed by: INTERNAL MEDICINE

## 2024-08-06 ENCOUNTER — TELEPHONE (OUTPATIENT)
Dept: SURGERY | Facility: CLINIC | Age: 64
End: 2024-08-06
Payer: MEDICARE

## 2024-08-06 ENCOUNTER — ANESTHESIA EVENT (OUTPATIENT)
Dept: SURGERY | Facility: HOSPITAL | Age: 64
End: 2024-08-06
Payer: MEDICARE

## 2024-08-07 ENCOUNTER — HOSPITAL ENCOUNTER (OUTPATIENT)
Facility: HOSPITAL | Age: 64
Discharge: HOME | End: 2024-08-07
Attending: SURGERY | Admitting: SURGERY
Payer: MEDICARE

## 2024-08-07 ENCOUNTER — ANESTHESIA (OUTPATIENT)
Dept: SURGERY | Facility: HOSPITAL | Age: 64
End: 2024-08-07
Payer: MEDICARE

## 2024-08-07 VITALS
OXYGEN SATURATION: 99 % | HEART RATE: 58 BPM | TEMPERATURE: 98 F | RESPIRATION RATE: 16 BRPM | DIASTOLIC BLOOD PRESSURE: 78 MMHG | SYSTOLIC BLOOD PRESSURE: 132 MMHG

## 2024-08-07 DIAGNOSIS — R59.0 MESENTERIC LYMPHADENOPATHY: Primary | ICD-10-CM

## 2024-08-07 LAB
GRAM STN SPEC: NORMAL
GRAM STN SPEC: NORMAL

## 2024-08-07 PROCEDURE — 71000044 HC DOSC ROUTINE RECOVERY FIRST HOUR: Performed by: SURGERY

## 2024-08-07 PROCEDURE — 25000242 PHARM REV CODE 250 ALT 637 W/ HCPCS

## 2024-08-07 PROCEDURE — 49321 LAPAROSCOPY BIOPSY: CPT | Mod: ,,, | Performed by: SURGERY

## 2024-08-07 PROCEDURE — 27201423 OPTIME MED/SURG SUP & DEVICES STERILE SUPPLY: Performed by: SURGERY

## 2024-08-07 PROCEDURE — 87070 CULTURE OTHR SPECIMN AEROBIC: CPT | Performed by: SURGERY

## 2024-08-07 PROCEDURE — 71000015 HC POSTOP RECOV 1ST HR: Performed by: SURGERY

## 2024-08-07 PROCEDURE — 63600175 PHARM REV CODE 636 W HCPCS

## 2024-08-07 PROCEDURE — 44899 UNLISTED PX MECKEL'S DVRTCLM: CPT | Mod: ,,, | Performed by: SURGERY

## 2024-08-07 PROCEDURE — 25000003 PHARM REV CODE 250

## 2024-08-07 PROCEDURE — 88189 FLOWCYTOMETRY/READ 16 & >: CPT | Mod: ,,, | Performed by: PATHOLOGY

## 2024-08-07 PROCEDURE — 87205 SMEAR GRAM STAIN: CPT | Performed by: SURGERY

## 2024-08-07 PROCEDURE — 63600175 PHARM REV CODE 636 W HCPCS: Mod: JZ,JG | Performed by: SURGERY

## 2024-08-07 PROCEDURE — 37000009 HC ANESTHESIA EA ADD 15 MINS: Performed by: SURGERY

## 2024-08-07 PROCEDURE — 36000711: Performed by: SURGERY

## 2024-08-07 PROCEDURE — 87116 MYCOBACTERIA CULTURE: CPT | Performed by: SURGERY

## 2024-08-07 PROCEDURE — 25000003 PHARM REV CODE 250: Performed by: COMMUNITY HEALTH WORKER

## 2024-08-07 PROCEDURE — 88305 TISSUE EXAM BY PATHOLOGIST: CPT | Performed by: PATHOLOGY

## 2024-08-07 PROCEDURE — 36000712 HC OR TIME LEV V 1ST 15 MIN: Performed by: SURGERY

## 2024-08-07 PROCEDURE — 87102 FUNGUS ISOLATION CULTURE: CPT | Performed by: SURGERY

## 2024-08-07 PROCEDURE — 87075 CULTR BACTERIA EXCEPT BLOOD: CPT | Performed by: SURGERY

## 2024-08-07 PROCEDURE — 37000008 HC ANESTHESIA 1ST 15 MINUTES: Performed by: SURGERY

## 2024-08-07 PROCEDURE — 63600175 PHARM REV CODE 636 W HCPCS: Performed by: COMMUNITY HEALTH WORKER

## 2024-08-07 PROCEDURE — 87206 SMEAR FLUORESCENT/ACID STAI: CPT | Performed by: SURGERY

## 2024-08-07 PROCEDURE — 36000710: Performed by: SURGERY

## 2024-08-07 PROCEDURE — 88184 FLOWCYTOMETRY/ TC 1 MARKER: CPT | Performed by: PATHOLOGY

## 2024-08-07 PROCEDURE — 88307 TISSUE EXAM BY PATHOLOGIST: CPT | Performed by: PATHOLOGY

## 2024-08-07 PROCEDURE — 36000713 HC OR TIME LEV V EA ADD 15 MIN: Performed by: SURGERY

## 2024-08-07 PROCEDURE — 88185 FLOWCYTOMETRY/TC ADD-ON: CPT | Mod: 59 | Performed by: PATHOLOGY

## 2024-08-07 RX ORDER — MIDAZOLAM HYDROCHLORIDE 1 MG/ML
INJECTION INTRAMUSCULAR; INTRAVENOUS
Status: DISCONTINUED | OUTPATIENT
Start: 2024-08-07 | End: 2024-08-07

## 2024-08-07 RX ORDER — ALBUTEROL SULFATE 90 UG/1
INHALANT RESPIRATORY (INHALATION)
Status: DISCONTINUED | OUTPATIENT
Start: 2024-08-07 | End: 2024-08-07

## 2024-08-07 RX ORDER — SUCCINYLCHOLINE CHLORIDE 20 MG/ML
INJECTION INTRAMUSCULAR; INTRAVENOUS
Status: DISCONTINUED | OUTPATIENT
Start: 2024-08-07 | End: 2024-08-07

## 2024-08-07 RX ORDER — SODIUM CHLORIDE 0.9 % (FLUSH) 0.9 %
10 SYRINGE (ML) INJECTION
Status: DISCONTINUED | OUTPATIENT
Start: 2024-08-07 | End: 2024-08-07 | Stop reason: HOSPADM

## 2024-08-07 RX ORDER — DEXAMETHASONE SODIUM PHOSPHATE 4 MG/ML
INJECTION, SOLUTION INTRA-ARTICULAR; INTRALESIONAL; INTRAMUSCULAR; INTRAVENOUS; SOFT TISSUE
Status: DISCONTINUED | OUTPATIENT
Start: 2024-08-07 | End: 2024-08-07

## 2024-08-07 RX ORDER — BUPIVACAINE HYDROCHLORIDE 2.5 MG/ML
INJECTION, SOLUTION EPIDURAL; INFILTRATION; INTRACAUDAL
Status: DISCONTINUED | OUTPATIENT
Start: 2024-08-07 | End: 2024-08-07 | Stop reason: HOSPADM

## 2024-08-07 RX ORDER — ACETAMINOPHEN 500 MG
1000 TABLET ORAL EVERY 8 HOURS
COMMUNITY
Start: 2024-08-07 | End: 2024-08-13

## 2024-08-07 RX ORDER — PROPOFOL 10 MG/ML
VIAL (ML) INTRAVENOUS
Status: DISCONTINUED | OUTPATIENT
Start: 2024-08-07 | End: 2024-08-07

## 2024-08-07 RX ORDER — SODIUM CHLORIDE 9 MG/ML
INJECTION, SOLUTION INTRAVENOUS CONTINUOUS
Status: DISCONTINUED | OUTPATIENT
Start: 2024-08-07 | End: 2024-08-07 | Stop reason: HOSPADM

## 2024-08-07 RX ORDER — FENTANYL CITRATE 50 UG/ML
INJECTION, SOLUTION INTRAMUSCULAR; INTRAVENOUS
Status: DISCONTINUED | OUTPATIENT
Start: 2024-08-07 | End: 2024-08-07

## 2024-08-07 RX ORDER — POLYETHYLENE GLYCOL 3350 17 G/17G
17 POWDER, FOR SOLUTION ORAL DAILY
COMMUNITY
Start: 2024-08-07 | End: 2024-08-13

## 2024-08-07 RX ORDER — LIDOCAINE HYDROCHLORIDE 20 MG/ML
INJECTION, SOLUTION EPIDURAL; INFILTRATION; INTRACAUDAL; PERINEURAL
Status: DISCONTINUED | OUTPATIENT
Start: 2024-08-07 | End: 2024-08-07

## 2024-08-07 RX ORDER — ROCURONIUM BROMIDE 10 MG/ML
INJECTION, SOLUTION INTRAVENOUS
Status: DISCONTINUED | OUTPATIENT
Start: 2024-08-07 | End: 2024-08-07

## 2024-08-07 RX ORDER — LIDOCAINE HYDROCHLORIDE 10 MG/ML
INJECTION, SOLUTION EPIDURAL; INFILTRATION; INTRACAUDAL; PERINEURAL
Status: COMPLETED
Start: 2024-08-07 | End: 2024-08-07

## 2024-08-07 RX ORDER — METRONIDAZOLE 500 MG/100ML
INJECTION, SOLUTION INTRAVENOUS
Status: DISCONTINUED | OUTPATIENT
Start: 2024-08-07 | End: 2024-08-07

## 2024-08-07 RX ORDER — HALOPERIDOL 5 MG/ML
0.5 INJECTION INTRAMUSCULAR EVERY 10 MIN PRN
Status: DISCONTINUED | OUTPATIENT
Start: 2024-08-07 | End: 2024-08-07 | Stop reason: HOSPADM

## 2024-08-07 RX ORDER — LIDOCAINE HYDROCHLORIDE 10 MG/ML
1 INJECTION, SOLUTION EPIDURAL; INFILTRATION; INTRACAUDAL; PERINEURAL ONCE AS NEEDED
Status: COMPLETED | OUTPATIENT
Start: 2024-08-07 | End: 2024-08-07

## 2024-08-07 RX ORDER — FENTANYL CITRATE 50 UG/ML
25 INJECTION, SOLUTION INTRAMUSCULAR; INTRAVENOUS EVERY 5 MIN PRN
Status: DISCONTINUED | OUTPATIENT
Start: 2024-08-07 | End: 2024-08-07 | Stop reason: HOSPADM

## 2024-08-07 RX ORDER — ONDANSETRON HYDROCHLORIDE 2 MG/ML
INJECTION, SOLUTION INTRAVENOUS
Status: DISCONTINUED | OUTPATIENT
Start: 2024-08-07 | End: 2024-08-07

## 2024-08-07 RX ORDER — OXYCODONE HYDROCHLORIDE 5 MG/1
5 TABLET ORAL EVERY 6 HOURS PRN
Qty: 10 TABLET | Refills: 0 | Status: SHIPPED | OUTPATIENT
Start: 2024-08-07 | End: 2024-08-13

## 2024-08-07 RX ORDER — GLUCAGON 1 MG
1 KIT INJECTION
Status: DISCONTINUED | OUTPATIENT
Start: 2024-08-07 | End: 2024-08-07 | Stop reason: HOSPADM

## 2024-08-07 RX ORDER — PHENYLEPHRINE HYDROCHLORIDE 10 MG/ML
INJECTION INTRAVENOUS
Status: DISCONTINUED | OUTPATIENT
Start: 2024-08-07 | End: 2024-08-07

## 2024-08-07 RX ADMIN — PROPOFOL 20 MG: 10 INJECTION, EMULSION INTRAVENOUS at 09:08

## 2024-08-07 RX ADMIN — DEXAMETHASONE SODIUM PHOSPHATE 4 MG: 4 INJECTION INTRA-ARTICULAR; INTRALESIONAL; INTRAMUSCULAR; INTRAVENOUS; SOFT TISSUE at 09:08

## 2024-08-07 RX ADMIN — PHENYLEPHRINE HYDROCHLORIDE 100 MCG: 10 INJECTION INTRAVENOUS at 08:08

## 2024-08-07 RX ADMIN — ALBUTEROL SULFATE 10 PUFF: 108 INHALANT RESPIRATORY (INHALATION) at 09:08

## 2024-08-07 RX ADMIN — ONDANSETRON 4 MG: 2 INJECTION INTRAMUSCULAR; INTRAVENOUS at 09:08

## 2024-08-07 RX ADMIN — FENTANYL CITRATE 25 MCG: 50 INJECTION INTRAMUSCULAR; INTRAVENOUS at 10:08

## 2024-08-07 RX ADMIN — CEFAZOLIN 2 G: 2 INJECTION, POWDER, FOR SOLUTION INTRAMUSCULAR; INTRAVENOUS at 08:08

## 2024-08-07 RX ADMIN — PHENYLEPHRINE HYDROCHLORIDE 100 MCG: 10 INJECTION INTRAVENOUS at 09:08

## 2024-08-07 RX ADMIN — ALBUTEROL SULFATE 8 PUFF: 108 INHALANT RESPIRATORY (INHALATION) at 09:08

## 2024-08-07 RX ADMIN — ROCURONIUM BROMIDE 10 MG: 10 INJECTION, SOLUTION INTRAVENOUS at 08:08

## 2024-08-07 RX ADMIN — SODIUM CHLORIDE: 0.9 INJECTION, SOLUTION INTRAVENOUS at 07:08

## 2024-08-07 RX ADMIN — PROPOFOL 170 MG: 10 INJECTION, EMULSION INTRAVENOUS at 08:08

## 2024-08-07 RX ADMIN — MIDAZOLAM 1 MG: 1 INJECTION INTRAMUSCULAR; INTRAVENOUS at 08:08

## 2024-08-07 RX ADMIN — LIDOCAINE HYDROCHLORIDE 100 MG: 20 INJECTION, SOLUTION EPIDURAL; INFILTRATION; INTRACAUDAL at 08:08

## 2024-08-07 RX ADMIN — FENTANYL CITRATE 100 MCG: 50 INJECTION, SOLUTION INTRAMUSCULAR; INTRAVENOUS at 08:08

## 2024-08-07 RX ADMIN — METRONIDAZOLE 500 MG: 500 INJECTION, SOLUTION INTRAVENOUS at 08:08

## 2024-08-07 RX ADMIN — ROCURONIUM BROMIDE 30 MG: 10 INJECTION, SOLUTION INTRAVENOUS at 09:08

## 2024-08-07 RX ADMIN — LIDOCAINE HYDROCHLORIDE 10 MG: 10 INJECTION, SOLUTION EPIDURAL; INFILTRATION; INTRACAUDAL; PERINEURAL at 06:08

## 2024-08-07 RX ADMIN — SODIUM CHLORIDE, SODIUM GLUCONATE, SODIUM ACETATE, POTASSIUM CHLORIDE, MAGNESIUM CHLORIDE, SODIUM PHOSPHATE, DIBASIC, AND POTASSIUM PHOSPHATE: .53; .5; .37; .037; .03; .012; .00082 INJECTION, SOLUTION INTRAVENOUS at 08:08

## 2024-08-07 RX ADMIN — SUGAMMADEX 400 MG: 100 INJECTION, SOLUTION INTRAVENOUS at 09:08

## 2024-08-07 RX ADMIN — GLYCOPYRROLATE 0.2 MG: 0.2 INJECTION, SOLUTION INTRAMUSCULAR; INTRAVENOUS at 08:08

## 2024-08-07 RX ADMIN — ROCURONIUM BROMIDE 30 MG: 10 INJECTION, SOLUTION INTRAVENOUS at 08:08

## 2024-08-07 RX ADMIN — SUCCINYLCHOLINE CHLORIDE 200 MG: 20 INJECTION, SOLUTION INTRAMUSCULAR; INTRAVENOUS; PARENTERAL at 08:08

## 2024-08-07 NOTE — OP NOTE
[facility]  Surgery Department  Operative Note    SUMMARY     Date of Procedure: 8/7/2024     Primary Surgeon: Surgeons and Role:     * Sanket Gallagher MD - Primary     * Ben Watkins MD - Resident - Chief       Pre-Operative Diagnosis: Mesenteric lymphadenopathy [R59.0]  Obesity with BMI 33    Post-op Diagnosis: Same plus Meckel's Diverticulum      Anesthesia: General     Procedure: Procedure(s) (LRB):  XI Robotic BIOPSY, MASS, PARA-AORTIC, LAPAROSCOPIC, mesenteric lymph node biopsy with poss sm bowel biopsy (N/A)     Indications for the procedure: Rosanna Live is a 64 y.o. with BMI of 33, abdominal pain, and chronic episodic diarrhea. Her imaging workup has consistently revealed concerns for mesenteric panniculitis and lymphadenopathy. We recommended they undergo robotic exploration and biopsy and the patient agreed to proceed. The patient signed informed consent and expressed understanding of the risks, benefits, and alternatives of surgery.     Operative Findings (including complications, if any):   Colon looked at including area with prior endoscopic marking and no abnormalities seen. Small bowel completely run and 2 antimesenteric lesions seen and removed. Distal lesion likely meckel's and proximal lesion possible lipoma but it was opposite some of the mesenteric thickening. Mesenteric thickening of proximal small bowel. This was more evident on the right side with peritoneal changes. An area of this was excised as well as an area on the left side that was thick but without peritoneal changes. All areas sent for path and right mesenteric areas also sent for culture and histology.     Procedure in Detail: After the procedure was explained and all questions answered appropriately, consent was obtained. The patient was then brought back to the Operating Room, where she was placed in the supine position.  General endotracheal anesthesia was then induced.  Next, the  patient was prepped and draped in the usual sterile fashion.  A timeout, including the patient's name, allergies, procedure, was then performed, to which all members of the operative team agreed. We also confirmed the administration of appropriate pre-operative antibiotics.     We marked our robotic trocar sites which included two sites to the right of midline approximately 15 cm inferior to the xiphoid and 7 cm apart from each other. Two additional trocars were placed to the left of midline 7 cm apart with the most lateral trocar about 18 cm from the xiphoid. An incision was made in the most right lateral 1 and a 5 mm Optiview trocar was placed under direct vision into the peritoneum and pneumoperitoneum to 15 mmHg CO2 gas was obtained. Three 8 mm robot trocars were then placed under direct visualization after injecting local anesthetic. Our 5 mm trocar was then replaced with a 12 mm robotic trocar under direct visualization. The robot was then docked.    Immediately apparent was a tattooed area on the anterior aspect of the transverse colon. We did not identify any abnormalities in the colon here or at its mesentery. The transverse colon was then elevated and the ligament of treitz was identified. We ran the bowel from here to the cecum. We identified a slightly raised and pale antimesenteric lesion at the proximal bowel and its associated thickened and slightly pale mesentery. As we continued to run the bowel distally, we encountered a second antimesenteric lesion in the ileum. This was slightly more than two feet from the cecum, but it otherwise appeared consistent with a Meckel's diverticulum. We did not appreciate any additional abnormalities.    We elected to resect the distal small bowel diverticulum. We did this with the robotic stapler and a white load. We made sure that our resection margin was on the normal small bowel without causing stenosis. The stapler was fired and the specimen was placed above the  liver. We examined the staple line here and were happy with the resection, hemostasis, and insignificant decrease in the small bowel caliber. We then moved to our proximal small bowel lesion which was similarly elevated and transected with a white load. We were happy with this staple line as well. We then began to obtain a sample of this abnormal mesentery here. The peritoneum was scored with hot scissors to create our lateral, medial, superior, and inferior margins. We then created our deep margin with hot scissors at a depth to obtain nodes but to stay superficial to any mesenteric vasculature. We were able to avoid any of this vasculature and this specimen was freed. We then examined the opposite site of this mesentery. We did not appreciate any changes in the peritoneum, but we did note a soft, raised area. The peritoneum was scored here with hot scissors and the fatty, bland looking mass was biopsied.     We then began the process of removing our specimens. The small bowel specimens were placed in an endocatch bag, were removed from our 12 mm site, and were sent for path. The mesenteric sample was placed in an endocatch bag, was removed from our 12 mm site, and was sent for pathology, flow, and culture. The fatty mass from the opposite side was able to be removed on a locking grasper through our 12 mm site and was similarly sent for for pathology, flow, and culture. Hemostasis was confirmed. The trocars were removed under direct visualization and the gas was evacuated. The remainder of our local was injected at each site. Each site was then closed with a 4-0 Plain cat gut suture in a deep dermal fashion. The skin was cleaned and dried. Dermabond was applied. This concluded our operation. She was awoken from anesthesia without incident and was taken to recovery in good condition.    Estimated Blood Loss (EBL): Minimal           Implants: * No implants in log *    Specimens:   Specimen (24h ago, onward)        Start     Ordered    08/07/24 0900  Specimen to Pathology, Surgery General Surgery  Once        Comments: Pre-op Diagnosis: Mesenteric lymphadenopathy [R59.0]Procedure(s):XI Robotic BIOPSY, MASS, PARA-AORTIC, LAPAROSCOPIC, mesenteric lymph node biopsy with poss sm bowel biopsy Number of specimens: 4Name of specimens: 1.) Mesenteric biopsy (permanent) ** please do immunologic stain for IgG4 plasma cells**2.) Mesenteric mass(permanent) ** please do immunologic stain for IgG4 plasma cells**3.) Distal small bowel lesion (permanent) ** please do immunologic stain for IgG4 plasma cells**4.) Proximal small bowel lesion (permanent) ** please do immunologic stain for IgG4 plasma cells**     References:    Click here for ordering Quick Tip   Question Answer Comment   Procedure Type: General Surgery    Release to patient Immediate        08/07/24 0930                            Condition: Good    Disposition: PACU - hemodynamically stable.    Attestation: Dr. Gallagher was present for and scrubbed the entire procedure.    Ben Watkins MD  General Surgery PGY-5  08/07/2024

## 2024-08-07 NOTE — BRIEF OP NOTE
Operative Note       Surgery Date: 8/7/2024     Surgeons and Role:     * Sanket Gallagher MD - Primary     * Ben Watkins MD - Resident - Assisting    Pre-op Diagnosis:  Mesenteric lymphadenopathy [R59.0]    Post-op Diagnosis:  Mesenteric lymphadenopathy [R59.0]    Procedure(s) (LRB):  XI Robotic BIOPSY, MASS, PARA-AORTIC, LAPAROSCOPIC, mesenteric lymph node biopsy with poss sm bowel biopsy (N/A)    Anesthesia: General    Procedure in Detail/Findings:  Colon looked at including area with prior endoscopic marking and no abnormalities seen.  Small bowel completely run and 2 antimesenteric lesions seen and removed. Distal lesion likely meckel's and proximal lesion possible lipoma but it was opposite some of the mesenteric thickening.  Mesenteric thickening of proximal small bowel.  This was more evident on the right side with peritoneal changes.  An area of this was excised as well as an area on the left side that was thick but without peritoneal changes.  All areas sent for path and right mesenteric areas also sent for culture and histology.    Estimated Blood Loss: Minimal           Specimens (From admission, onward)       Start     Ordered    08/07/24 0900  Specimen to Pathology, Surgery General Surgery  Once        Comments: Pre-op Diagnosis: Mesenteric lymphadenopathy [R59.0]Procedure(s):XI Robotic BIOPSY, MASS, PARA-AORTIC, LAPAROSCOPIC, mesenteric lymph node biopsy with poss sm bowel biopsy Number of specimens: 4Name of specimens: 1.) Mesenteric biopsy (permanent) ** please do immunologic stain for IgG4 plasma cells**2.) Mesenteric mass(permanent) ** please do immunologic stain for IgG4 plasma cells**3.) Distal small bowel lesion (permanent) ** please do immunologic stain for IgG4 plasma cells**4.) Proximal small bowel lesion (permanent) ** please do immunologic stain for IgG4 plasma cells**     References:    Click here for ordering Quick Tip   Question Answer Comment   Procedure Type: General  Surgery    Release to patient Immediate        08/07/24 2430                  Implants: * No implants in log *           Disposition: PACU - hemodynamically stable.           Condition: Good    Attestation:  I was present and scrubbed for the entire procedure.

## 2024-08-07 NOTE — PLAN OF CARE
Pt Aox4. VSS. No signs of distress. Patient educated and given discharge instructions. All questions answered. Pt ready for discharge.

## 2024-08-07 NOTE — DISCHARGE SUMMARY
[facility] - [facility] (2nd Fl)  Discharge Note  Short Stay    Procedure(s) (LRB):  XI Robotic BIOPSY, MASS, PARA-AORTIC, LAPAROSCOPIC, mesenteric lymph node biopsy with poss sm bowel biopsy (N/A)      OUTCOME: Patient tolerated treatment/procedure well without complication and is now ready for discharge.    DISPOSITION: Home or Self Care    FINAL DIAGNOSIS:  Mesenteric lymphadenopathy    FOLLOWUP: In clinic    DISCHARGE INSTRUCTIONS:    Discharge Procedure Orders   Diet Adult Regular     Lifting restrictions   Order Comments: No lifting greater than 10 pounds for 6 weeks from day of surgery.  No pushing/pulling such as vacuuming or raking.  No straining, avoid constipation and take stool softeners as described and laxatives as needed.  No driving while on narcotics and until you can react quickly without pain.     Notify your health care provider if you experience any of the following:  redness, tenderness, or signs of infection (pain, swelling, redness, odor or green/yellow discharge around incision site)     Notify your health care provider if you experience any of the following:  severe uncontrolled pain     Notify your health care provider if you experience any of the following:  persistent nausea and vomiting or diarrhea     Notify your health care provider if you experience any of the following:  temperature >100.4     No dressing needed        TIME SPENT ON DISCHARGE: 10 minutes    Ben Watkins MD  General Surgery PGY-5  08/07/2024

## 2024-08-08 ENCOUNTER — DOCUMENTATION ONLY (OUTPATIENT)
Dept: SURGERY | Facility: CLINIC | Age: 64
End: 2024-08-08
Payer: MEDICARE

## 2024-08-08 ENCOUNTER — TELEPHONE (OUTPATIENT)
Dept: SURGERY | Facility: CLINIC | Age: 64
End: 2024-08-08
Payer: MEDICARE

## 2024-08-08 LAB
ACID FAST MOD KINY STN SPEC: NORMAL
FLOW CYTOMETRY ANTIBODIES ANALYZED - LYMPH NODE: NORMAL
FLOW CYTOMETRY COMMENT - LYMPH NODE: NORMAL
FLOW CYTOMETRY INTERPRETATION - LYMPH NODE: NORMAL
MYCOBACTERIUM SPEC QL CULT: NORMAL

## 2024-08-09 ENCOUNTER — PATIENT MESSAGE (OUTPATIENT)
Dept: GASTROENTEROLOGY | Facility: CLINIC | Age: 64
End: 2024-08-09
Payer: MEDICARE

## 2024-08-10 LAB — BACTERIA SPEC AEROBE CULT: NO GROWTH

## 2024-08-12 LAB — FUNGUS SPEC CULT: NORMAL

## 2024-08-13 ENCOUNTER — OFFICE VISIT (OUTPATIENT)
Dept: URGENT CARE | Facility: CLINIC | Age: 64
End: 2024-08-13
Payer: MEDICARE

## 2024-08-13 VITALS
HEIGHT: 66 IN | HEART RATE: 63 BPM | TEMPERATURE: 98 F | DIASTOLIC BLOOD PRESSURE: 86 MMHG | OXYGEN SATURATION: 97 % | BODY MASS INDEX: 32.76 KG/M2 | RESPIRATION RATE: 16 BRPM | WEIGHT: 203.81 LBS | SYSTOLIC BLOOD PRESSURE: 134 MMHG

## 2024-08-13 DIAGNOSIS — R21 RASH AND NONSPECIFIC SKIN ERUPTION: Primary | ICD-10-CM

## 2024-08-13 LAB
FINAL PATHOLOGIC DIAGNOSIS: NORMAL
GROSS: NORMAL
Lab: NORMAL

## 2024-08-13 PROCEDURE — 99213 OFFICE O/P EST LOW 20 MIN: CPT | Mod: S$GLB,,, | Performed by: NURSE PRACTITIONER

## 2024-08-13 NOTE — PATIENT INSTRUCTIONS
1) You may take Zyrtec, Claritin, or Allegra daily for the next 5-7 days to prevent or suppress the itching if needed. You can also take Benedryl 25 mg at bedtime if needed.   2) If your condition fails to improve in a timely manner, you should receive another evaluation by your Primary Care Provider/Pediatrician to discuss your concerns or return to urgent care for a recheck.      If your condition worsens at any time especially if you develop any symptoms such as tongue swelling, difficulty breathing, swallowing, or talking, chest pain, SOB, you should report immediately to your nearest Emergency Department for further evaluation. **You must understand that you have received Urgent Care treatment only and that you may be released before all of your medical problems are known or treated. You, the patient, are responsible to arrange for follow-up care as instructed.

## 2024-08-13 NOTE — PROGRESS NOTES
"Subjective:      Patient ID: Rosanna Live is a 64 y.o. female.    Vitals:  height is 5' 6" (1.676 m) and weight is 92.4 kg (203 lb 12.8 oz). Her oral temperature is 98.1 °F (36.7 °C). Her blood pressure is 134/86 and her pulse is 63. Her respiration is 16 and oxygen saturation is 97%.     Chief Complaint: Rash    Patient presents with rash on abdomen. Rash does not itch, no SOB. Wants to just make sure she is not contagious. States she recently had surgery, no complications.     Rash  This is a new problem. The current episode started in the past 7 days (8/10). The problem is unchanged. The affected locations include the abdomen. The rash is characterized by redness. It is unknown if there was an exposure to a precipitant. Pertinent negatives include no fever, shortness of breath or vomiting. Past treatments include antibiotic cream. The treatment provided no relief.       Constitution: Negative for fever.   Respiratory:  Negative for shortness of breath.    Gastrointestinal:  Negative for vomiting.   Skin:  Positive for rash.      Objective:     Physical Exam   Constitutional: She is oriented to person, place, and time. She appears well-developed.  Non-toxic appearance. She does not appear ill. No distress.   HENT:   Head: Normocephalic and atraumatic. Head is without abrasion, without contusion and without laceration.   Ears:   Right Ear: External ear normal.   Left Ear: External ear normal.   Nose: Nose normal.   Mouth/Throat: Oropharynx is clear and moist and mucous membranes are normal.   Eyes: Conjunctivae, EOM and lids are normal. Pupils are equal, round, and reactive to light.   Neck: Trachea normal and phonation normal. Neck supple.   Cardiovascular: Normal rate, regular rhythm and normal heart sounds.   Pulmonary/Chest: Effort normal and breath sounds normal. No stridor. No respiratory distress.   Abdominal: Soft.      Comments: Incisions to abdomen: no erythema, swelling or drainage.  " Pediatric Therapy at [facility]  Pediatric Speech Language Treatment Note    Patient: Israel Matthews Today's Date: 24   MRN: [id number] Time:  Start Time: 1015  Stop Time: 1100  Total time in clinic (min): 45 minutes   : 2020 Therapist: Taylor Infante CCC-SLP   Age: 4 y.o. Referring Provider: Ashlie Acosta CRNP     Diagnosis:  Encounter Diagnosis     ICD-10-CM    1. Phonological disorder  F80.0       2. Speech delay  F80.9         Authorization Tracking  POC/Progress Note Due Unit Limit Per Visit/Auth Auth Expiration Date PT/OT/ST + Visit Limit?   10/9/2024 N/a 2024 N/a                             Visit/Unit Tracking  Auth Status: Date of service 24 5    Visits Authorized: BOMN Used 2 3 4 5 6 7 8 9 10    IE Date: 2024  Re-Eval Due: 2025 Remaining BOMN BOMN BOMN BOMN BOMN BOMN BOMN BOMN BOMN      SUBJECTIVE  Israel Matthews arrived to pediatric speech language therapy treatment with Mother and sibling(s) who remained in session. Mother reported the following medical/social updates: n/a  Others present include: sibling.    Patient Observations:  Cooperative, engaging  Impressions based on observation and/or parent report     OBJECTIVE  Goals:    Short Term Goals  Goal Goal Status   Patient will produce /s/ in all positions of words with 80% accuracy.     [] Goal met  [] Goal in progress  [] New goal  [x] Goal targeted  [] Goal not targeted  [] Goal modified  [] other   Comments: /s/ blends at 80% at word level with clinician model   Patient will produce /z/ in all positions of words with 80% accuracy.  [] Goal met  [] Goal in progress  [] New goal  [x] Goal targeted  [] Goal not targeted  [] Goal modified  [] other   Comments: final /z/ at phrase level 80% with clinician model   Patient will produce /sh/ in all positions of words with 80% accuracy.  [] Goal met  [] Goal in progress  [] New goal  [] Goal targeted  [x] Goal not targeted  []    Musculoskeletal: Normal range of motion.         General: Normal range of motion.   Neurological: She is alert and oriented to person, place, and time.   Skin: Skin is warm, dry, intact, not diaphoretic, rash and maculopapular (difuse to abdomen and both arms). Capillary refill takes less than 2 seconds. No abrasion, No burn, No bruising and No ecchymosis   Psychiatric: Her speech is normal and behavior is normal. Judgment and thought content normal.   Nursing note and vitals reviewed.      Assessment:     1. Rash and nonspecific skin eruption        Plan:       Rash and nonspecific skin eruption                     Goal modified  [] other   Comments:    Patient will produce /l/ in all positions of words with 80% accuracy.  [] Goal met  [] Goal in progress  [] New goal  [] Goal targeted  [x] Goal not targeted  [] Goal modified  [] other   Comments:      Patient will produce /f/ in all positions of words with 80% accuracy.  [] Goal met  [] Goal in progress  [] New goal  [] Goal targeted  [x] Goal not targeted  [] Goal modified  [] other   Comments:          Long Term Goals  Goal Goal Status   Patient will increase overall intelligibility to within functional limits for age by discharge.  [] Goal met  [x] Goal in progress  [] New goal  [] Goal targeted  [] Goal not targeted  [] Goal modified  [] other   Comments:    Family will be educated on and provided with home exercises and carryover strategies to promote generalization of overall speech intelligibility by discharge. [] Goal met  [x] Goal in progress  [] New goal  [] Goal targeted  [] Goal not targeted  [] Goal modified  [] other   Comments:       Intervention Comments: articulation placement techniques, use of mirror, speech sound visual cues, PROMPT as needed    ASSESSMENT  Israel Matthews tolerated pediatric speech language therapy treatment session well. Barriers to engagement include: none. Skilled pediatric speech language therapy intervention continues to be required at the recommended frequency due to deficits in overall speech intelligibility. During today’s treatment session, Israel Matthews demonstrated progress in the areas of learning placement for /z/ and /s/ in articulation targets at word and phrase level    Patient and Family Training and Education:  Topics: Therapy Plan and Exercise/Activity  Methods: Handout  Response: Demonstrated understanding  Recipient: Mother    PLAN  Continue per plan of care.

## 2024-08-14 LAB — BACTERIA SPEC ANAEROBE CULT: NORMAL

## 2024-08-15 ENCOUNTER — OFFICE VISIT (OUTPATIENT)
Dept: RHEUMATOLOGY | Facility: CLINIC | Age: 64
End: 2024-08-15
Payer: MEDICARE

## 2024-08-15 VITALS
BODY MASS INDEX: 32.38 KG/M2 | WEIGHT: 201.5 LBS | DIASTOLIC BLOOD PRESSURE: 67 MMHG | SYSTOLIC BLOOD PRESSURE: 103 MMHG | HEART RATE: 70 BPM | HEIGHT: 66 IN

## 2024-08-15 DIAGNOSIS — K65.4 MESENTERIC PANNICULITIS: Primary | ICD-10-CM

## 2024-08-15 DIAGNOSIS — M25.50 ARTHRALGIA, UNSPECIFIED JOINT: ICD-10-CM

## 2024-08-15 DIAGNOSIS — M65.341 TRIGGER RING FINGER OF RIGHT HAND: ICD-10-CM

## 2024-08-15 DIAGNOSIS — K51.80 OTHER ULCERATIVE COLITIS WITHOUT COMPLICATION: ICD-10-CM

## 2024-08-15 PROCEDURE — 3044F HG A1C LEVEL LT 7.0%: CPT | Mod: CPTII,S$GLB,, | Performed by: STUDENT IN AN ORGANIZED HEALTH CARE EDUCATION/TRAINING PROGRAM

## 2024-08-15 PROCEDURE — 99214 OFFICE O/P EST MOD 30 MIN: CPT | Mod: S$GLB,,, | Performed by: STUDENT IN AN ORGANIZED HEALTH CARE EDUCATION/TRAINING PROGRAM

## 2024-08-15 PROCEDURE — 1101F PT FALLS ASSESS-DOCD LE1/YR: CPT | Mod: CPTII,S$GLB,, | Performed by: STUDENT IN AN ORGANIZED HEALTH CARE EDUCATION/TRAINING PROGRAM

## 2024-08-15 PROCEDURE — 3074F SYST BP LT 130 MM HG: CPT | Mod: CPTII,S$GLB,, | Performed by: STUDENT IN AN ORGANIZED HEALTH CARE EDUCATION/TRAINING PROGRAM

## 2024-08-15 PROCEDURE — 3008F BODY MASS INDEX DOCD: CPT | Mod: CPTII,S$GLB,, | Performed by: STUDENT IN AN ORGANIZED HEALTH CARE EDUCATION/TRAINING PROGRAM

## 2024-08-15 PROCEDURE — 3078F DIAST BP <80 MM HG: CPT | Mod: CPTII,S$GLB,, | Performed by: STUDENT IN AN ORGANIZED HEALTH CARE EDUCATION/TRAINING PROGRAM

## 2024-08-15 PROCEDURE — 3288F FALL RISK ASSESSMENT DOCD: CPT | Mod: CPTII,S$GLB,, | Performed by: STUDENT IN AN ORGANIZED HEALTH CARE EDUCATION/TRAINING PROGRAM

## 2024-08-15 PROCEDURE — 99999 PR PBB SHADOW E&M-EST. PATIENT-LVL III: CPT | Mod: PBBFAC,,, | Performed by: STUDENT IN AN ORGANIZED HEALTH CARE EDUCATION/TRAINING PROGRAM

## 2024-08-15 PROCEDURE — 1159F MED LIST DOCD IN RCRD: CPT | Mod: CPTII,S$GLB,, | Performed by: STUDENT IN AN ORGANIZED HEALTH CARE EDUCATION/TRAINING PROGRAM

## 2024-08-15 NOTE — PROGRESS NOTES
"     RHEUMATOLOGY OUTPATIENT CLINIC NOTE    8/15/2024    Attending Rheumatologist: Helena Watkins  Primary Care Provider: Geena Roman MD   Physician Requesting Consultation: No referring provider defined for this encounter.  Chief Complaint/Reason For Consultation:  Arthralgia, unspecified joint      Subjective:       HPI  Rosanna Live is a 64 y.o. 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     Interim history  -initial consult on 6/2024  -extensive workup revealed showed negative JEAN, myomarker panel, scleroderma Ab, ANCA, MPO, PR3, antiphospholipid, hepatitis B and C serologies, HIV, RF and CCP. HLAB27 positive  -Had mesenteric fat tissue biopsy on 8/7. Biopsy was negative for malignancy but no comments on IgG4 stains.   -developed a maculopapular rash in the abdomen, not pruritic. Went to UC yesterday and was told it was not contagious. She is feeling well, no URI symptoms. No preceding illness.     Disease history    She has history of gill's esophagus in the past. She is on daily Pepcid as she is intolerant to PPI.   In 7/2022 had findings of ischemic colitis in colonoscopy. In late 2022, she started having RUQ and epigastric pain. CT enterography at the time showed Mesenteric stranding and prominent mesenteric lymph nodes, similar to prior exam. Findings could suggest mesenteric adenitis. Colonoscopy on 4/2024 showed findings not definitive for inflammatory bowel disease but not consistent with ischemic colitis. She was started on mesalamine tablets and plan is to follow up colonoscopy in 6 months.   She has repeated CT enterography on 5/30 that showed findings of " Similar appearance of the mesentery with mesenteric stranding/ana mesentery sign, unchanged from 12/14/2016. Findings are nonspecific, but may suggest mesenteric panniculitis. Prominent mesenteric lymph nodes, unchanged from 2016. No retroperitoneal adenopathy. "   She was referred to " general surgery for lymph node biopsy which is scheduled for 8/7/24.   She was referred to rheumatology for further evaluation of mesenteric panniculitis.     Reports Left 3rd trigger finger for the past month, it can be very painful at times. She feels like the right 3rd finger is starting to trigger.   Reports numbness in hands and feet in the morning, improves after moving the hand   Reports morning stiffness for about 1 hour  Reports pain in bilateral knees, right worse than left, worse when climbing stairs, no swelling.   Reports swelling in bilateral ankles and erythema after prolonged walking.     +dysphagia - hx of esophageal dilation many years ago.     No unintentional weight loss. Doing WW.     No malar rash,photosensitivity   No telangiectasias   No calcinosis   No patchy alopecia   No oral and nasal ulcers   No dry eyes   Has dry mouth + intermittent.   No pleurisy or any cardiopulmonary complaints   No dysphagia,diplopia and dysphonia and muscle weakness   No raynaud's   No digital ulcers   No cytopenias   No renal issues   No blood clots - prior hx of ischemic colitis  No fever,chills,night sweats,weight loss and loss of appetite   No new onset headaches   No recurrent conjunctivitis or uveitis or scleritis or episcleritis   No swollen glands  No seizures,strokes,psychosis  No sclerodactyly  No puffy hands  No perioral tightness      She is retired. Used to work as a .   Quit smoking 40 years ago.     Mother with MG, father with RA, daughter with amyloidosis, nephew with Crohn's     ------    8/7 mesenteric LN biopsy  1. MESENTERY, EXCISION:   Benign mature adipose tissue with features of fat necrosis   Negative for neoplasia or malignancy   No lymph nodes identified     2. MESENTERY, EXCISION:   Benign mature adipose tissue with features of fat necrosis   Negative for neoplasia or malignancy   No lymph nodes identified     3. SMALL BOWEL, DISTAL, PARTIAL RESECTION:   Portion of benign  small bowel with focal pancreatic heterotopia   Negative for neoplasia or malignancy     4. 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   HENT:  Negative for mouth sores and trouble swallowing.    Eyes:  Negative for redness.   Respiratory:  Negative for cough and shortness of breath.    Cardiovascular:  Negative for chest pain.   Gastrointestinal:  Positive for diarrhea. Negative for constipation.   Genitourinary:  Negative for dysuria and genital sores.   Integumentary:  Positive for rash.   Neurological:  Negative for headaches.   Hematological:  Bruises/bleeds easily.        Chronic comorbid conditions affecting medical decision making today:  Past Medical History:   Diagnosis Date    Allergy     Brito's esophagus     Basal cell cancer     Cataract     Diverticulosis     Edema     chronic right foot     Hiatal hernia 1/31/2017    IBS (irritable bowel syndrome)     Obesity     Rectovaginal fistula 6/28/2012    Vitamin D deficiency disease      Past Surgical History:   Procedure Laterality Date    CHOLECYSTECTOMY      COLONOSCOPY      COLONOSCOPY N/A 7/11/2022    Procedure: COLONOSCOPY;  Surgeon: Carlos Manuel Barber MD;  Location: [address]);  Service: Endoscopy;  Laterality: N/A;  golytely    COLONOSCOPY N/A 4/16/2024    Procedure: COLONOSCOPY;  Surgeon: Carlos Manuel Barber MD;  Location: [address]);  Service: Endoscopy;  Laterality: N/A;    ESOPHAGOGASTRODUODENOSCOPY      ESOPHAGOGASTRODUODENOSCOPY N/A 9/18/2019    Procedure: EGD (ESOPHAGOGASTRODUODENOSCOPY);  Surgeon: Carlos Manuel Barber MD;  Location: [address]);  Service: Endoscopy;  Laterality: N/A;  evaluate fundoplication    ESOPHAGOGASTRODUODENOSCOPY N/A 7/11/2022    Procedure: EGD (ESOPHAGOGASTRODUODENOSCOPY);  Surgeon: Carlos Manuel  JEFFREY Barber MD;  Location: [facility] (4TH FLR);  Service: Endoscopy;  Laterality: N/A;  [place] [facility] [facility] to [email or web address]    ESOPHAGOGASTRODUODENOSCOPY N/A 4/16/2024    Procedure: EGD (ESOPHAGOGASTRODUODENOSCOPY);  Surgeon: Carlos Manuel Barber MD;  Location: [facility] (4TH FLR);  Service: Endoscopy;  Laterality: N/A;  Suprep  Referral Dr. Barber  prep instructions given to pt in clinic  4/11-precall complete-MS    INGUINAL HERNIA REPAIR      LAPAROSCOPIC BIOPSY OF PARA-AORTIC MASS N/A 8/7/2024    Procedure: XI Robotic BIOPSY, MASS, PARA-AORTIC, LAPAROSCOPIC, mesenteric lymph node biopsy with poss sm bowel biopsy;  Surgeon: Sanket Gallagher MD;  Location: [facility] OR 2ND FLR;  Service: General;  Laterality: N/A;  also needs immunohistologic stain for IgG4 plasma cells, flow cytometry for hematologic malignancy and TB stains and cultures (per Dr. Gallagher)    LAPAROSCOPIC NISSEN FUNDOPLICATION  2/15/16    Mohs' procedure of face      RECTO VAGINAL FISTULA  2011, 2012    UMBILICAL HERNIA REPAIR       Family History   Problem Relation Name Age of Onset    Miscarriages / Stillbirths Mother      Diabetes Mother      Hypertension Mother      Glaucoma Mother      Cataracts Mother      Arthritis Father          Rhematoid    Hyperlipidemia Father      Cataracts Father      Glaucoma Father      Heart disease Father      No Known Problems Sister      No Known Problems Brother      Heart disease Maternal Grandmother      No Known Problems Maternal Grandfather      No Known Problems Paternal Grandmother      No Known Problems Paternal Grandfather      Heart disease Maternal Aunt      No Known Problems Maternal Uncle      No Known Problems Paternal Aunt      No Known Problems Paternal Uncle      Cancer Daughter daughter         amyloidosis    No Known Problems Son      Breast cancer Neg Hx      Colon cancer Neg Hx      Ovarian cancer Neg Hx       Social History     Substance and Sexual Activity    Alcohol Use Yes    Alcohol/week: 1.0 standard drink of alcohol    Types: 1 Glasses of wine per week    Comment: Social     Social History     Tobacco Use   Smoking Status Former    Current packs/day: 0.00    Average packs/day: 0.3 packs/day for 5.0 years (1.3 ttl pk-yrs)    Types: Cigarettes    Start date: 1978    Quit date: 1983    Years since quittin.9   Smokeless Tobacco Never     Social History     Substance and Sexual Activity   Drug Use No       Current Outpatient Medications:     EScitalopram oxalate (LEXAPRO) 10 MG tablet, Take 1 tablet (10 mg total) by mouth once daily., Disp: 30 tablet, Rfl: 11    famotidine (PEPCID) 20 MG tablet, TAKE TWO TABLETS BY MOUTH DAILY IN THE EVENING., Disp: 180 tablet, Rfl: 1    ferrous gluconate (FERGON) 324 MG tablet, Take 1 tablet (324 mg total) by mouth daily with breakfast., Disp: 90 tablet, Rfl: 1    mesalamine (APRISO) 0.375 gram Cp24, Take 4 capsules (1.5 g total) by mouth once daily., Disp: 360 capsule, Rfl: 1    metoprolol tartrate (LOPRESSOR) 25 MG tablet, Take 1 tablet (25 mg total) by mouth 2 (two) times daily., Disp: 90 tablet, Rfl: 3     Objective:         Vitals:    08/15/24 1040   BP: 103/67   Pulse: 70     Physical Exam   Constitutional: She is oriented to person, place, and time. She appears well-developed.   HENT:   Head: Normocephalic.   Mouth/Throat: Mucous membranes are moist.   Cardiovascular: Normal rate and normal heart sounds.   Pulmonary/Chest: Effort normal and breath sounds normal.   Abdominal: Soft. She exhibits no distension and no mass. There is no abdominal tenderness.   Musculoskeletal:      Cervical back: Neck supple.      Comments: Right 3rd flexor retinaculum with nodule and tenderness  No synovitis noted  Bilateral knees with crepitus, no instability, no joint line tenderness  Bilateral ankles with soft tissue swelling, no synovitis.  Negative MTP squeeze    Lymphadenopathy:     She has no cervical adenopathy.  "  Neurological: She is alert and oriented to person, place, and time.   Skin: No rash noted.   No Heliotrope rash  No Gottron papules  No sclerodactyly   No Raynaud's  No Petechiae  No discoid lesions  No alopecia  Normal Capillaroscopy   Vitals reviewed.      Reviewed old and all outside pertinent medical records available.    All lab results personally reviewed and interpreted by me.  Lab Results   Component Value Date    WBC 7.84 06/26/2024    HGB 12.4 06/26/2024    HGB 12.4 06/26/2024    HCT 37.6 06/26/2024    MCV 88 06/26/2024    MCH 28.9 06/26/2024    MCHC 33.0 06/26/2024    RDW 12.6 06/26/2024     06/26/2024    MPV 8.6 (L) 06/26/2024       Lab Results   Component Value Date     05/30/2024    K 3.9 05/30/2024     05/30/2024    CO2 22 (L) 05/30/2024    GLU 92 05/30/2024    BUN 12 05/30/2024    CALCIUM 9.6 05/30/2024    PROT 7.1 05/30/2024    ALBUMIN 3.7 05/30/2024    BILITOT 0.5 05/30/2024    AST 15 05/30/2024    ALKPHOS 94 05/30/2024    ALT 16 05/30/2024       Lab Results   Component Value Date    COLORU Yellow 06/26/2024    APPEARANCEUA Clear 06/26/2024    SPECGRAV 1.020 06/26/2024    PHUR 6.0 06/26/2024    PROTEINUA Negative 06/26/2024    KETONESU Negative 06/26/2024    LEUKOCYTESUR 2+ (A) 06/26/2024    NITRITE Negative 06/26/2024    UROBILINOGEN Negative 06/26/2024       Lab Results   Component Value Date    CRP 5.5 06/26/2024       Lab Results   Component Value Date    RF <13.0 05/30/2024    SEDRATE 23 (H) 06/26/2024    CCPANTIBODIE <0.5 06/26/2024       No components found for: "25OHVITDTOT", "39ZMQBKZ9", "54CUWFOC0", "METHODNOTE"    No results found for: "URICACID"    Lab Results   Component Value Date    HEPBSAG Non-reactive 06/26/2024    HEPBSAG Negative 02/14/2017    HEPCAB Non-reactive 06/26/2024    HEPCAB Negative 02/14/2017       Imaging:  All imaging reviewed and independently interpreted by me.     ASSESSMENT / PLAN:     Rosanna Live is a 64 y.o. White female " with:    1. Mesenteric panniculitis    Mesenteric stranding and prominent mesenteric lymph nodes, stable since 2016.    Extensive review of system is essentially negative with exception of joint pain.    Lesions have been stable for years    She is asymptomatic - no unintentional weight loss, abdominal pain, signs of obstruction.    Recent mesenteric biopsy neg for malignancy. No lymph nodes identified. No IgG4 stains. Reassurance   Extensive serologic markers negative   2. Hx of ischemic colitis  3. Ulcerative colitis on mesalamine   HLAB27 positive   4. Arthralgia   Hand and feet pain with no swelling but prolonged morning stiffness    Bilateral knee pain, + crepitus - likely OA  5. Right trigger finger.     Plan  -Mesenteric biopsy negative for malignancy. No LN identified.  Serologic markers negative except for positive HLAB27 that goes along with hx of UC.   -Monitor longitudinally due to arthralgias in hands and feet.   -If worsening trigger finger, she will let me know for injection.     Follow up in about 6 months (around 2/15/2025).     Method of contact with patient concerns: [facility]    Disclaimer:  This note is prepared using voice recognition software and as such is likely to have errors and has not been proof read. Please contact me for questions.     Time spent: 20 minutes in face to face discussion concerning diagnosis, prognosis, review of lab and test results, benefits of treatment as well as management of disease, counseling of patient and coordination of care between various health care providers.  Greater than half the time spent was used for coordination of care and counseling of patient.    Helena Watkins M.D.  Rheumatology  [facility]

## 2024-08-20 ENCOUNTER — PATIENT MESSAGE (OUTPATIENT)
Dept: ADMINISTRATIVE | Facility: HOSPITAL | Age: 64
End: 2024-08-20
Payer: MEDICARE

## 2024-08-20 ENCOUNTER — OFFICE VISIT (OUTPATIENT)
Dept: SURGERY | Facility: CLINIC | Age: 64
End: 2024-08-20
Payer: MEDICARE

## 2024-08-20 VITALS
BODY MASS INDEX: 32.99 KG/M2 | WEIGHT: 205.25 LBS | HEIGHT: 66 IN | HEART RATE: 63 BPM | SYSTOLIC BLOOD PRESSURE: 142 MMHG | DIASTOLIC BLOOD PRESSURE: 72 MMHG

## 2024-08-20 DIAGNOSIS — Z09 POSTOP CHECK: Primary | ICD-10-CM

## 2024-08-20 PROCEDURE — 1159F MED LIST DOCD IN RCRD: CPT | Mod: CPTII,S$GLB,, | Performed by: SURGERY

## 2024-08-20 PROCEDURE — 99024 POSTOP FOLLOW-UP VISIT: CPT | Mod: S$GLB,,, | Performed by: SURGERY

## 2024-08-20 PROCEDURE — 3044F HG A1C LEVEL LT 7.0%: CPT | Mod: CPTII,S$GLB,, | Performed by: SURGERY

## 2024-08-20 PROCEDURE — 1160F RVW MEDS BY RX/DR IN RCRD: CPT | Mod: CPTII,S$GLB,, | Performed by: SURGERY

## 2024-08-20 PROCEDURE — 3078F DIAST BP <80 MM HG: CPT | Mod: CPTII,S$GLB,, | Performed by: SURGERY

## 2024-08-20 PROCEDURE — 1101F PT FALLS ASSESS-DOCD LE1/YR: CPT | Mod: CPTII,S$GLB,, | Performed by: SURGERY

## 2024-08-20 PROCEDURE — 3288F FALL RISK ASSESSMENT DOCD: CPT | Mod: CPTII,S$GLB,, | Performed by: SURGERY

## 2024-08-20 PROCEDURE — 3077F SYST BP >= 140 MM HG: CPT | Mod: CPTII,S$GLB,, | Performed by: SURGERY

## 2024-08-20 PROCEDURE — 99999 PR PBB SHADOW E&M-EST. PATIENT-LVL III: CPT | Mod: PBBFAC,,, | Performed by: SURGERY

## 2024-08-20 NOTE — PROGRESS NOTES
I have seen the patient, reviewed the Student's history and physical, assessment and plan. I have personally interviewed and examined the patient at bedside and: agree with the findings.     S/p small bowel excision times 2 (meckel's and another mass) and mesenteric biopsy 8/7/24.  She is doing well.  Wounds clear.  Follow up prn.  I am not sure all the path testing is done and will look into it.

## 2024-08-20 NOTE — LETTER
August 20, 2024        Geena Roman MD  [address]  [facility] [address]             [facility]  [address]  [facility] [address]  Phone: [phone]   Patient: Rosanna Live   MR Number: [id number]   YOB: 1960   Date of Visit: 8/20/2024       Dear Dr. Roman:    Thank you for referring Rosanna Live to me for evaluation. Attached you will find relevant portions of my assessment and plan of care.    If you have questions, please do not hesitate to call me. I look forward to following Rosanna Live along with you.    Sincerely,      Sanket Gallagher MD            CC  MD Juan Diego Danielosure

## 2024-08-20 NOTE — PROGRESS NOTES
Minimally Invasive Surgery  Follow Up Clinic Note    Subjective:     Rosanna Live is a 64 y.o. female who presents to clinic for follow up s/p robotic mesenteric lymph node and small bowel biopsy on 8/7. Pt reports that they are feeling well. Tolerating a regular diet and having regular bowel movements. She reports continued issues with diarrhea but these are consistent with her pre-operative symptoms related to UC, denies hematochezia. Denies fever, chills, nausea, vomiting, or constipation. Pain is well controlled. Denies redness or drainage of incision.    Medications:  Current Outpatient Medications on File Prior to Visit   Medication Sig Dispense Refill    EScitalopram oxalate (LEXAPRO) 10 MG tablet Take 1 tablet (10 mg total) by mouth once daily. 30 tablet 11    famotidine (PEPCID) 20 MG tablet TAKE TWO TABLETS BY MOUTH DAILY IN THE EVENING. 180 tablet 1    ferrous gluconate (FERGON) 324 MG tablet Take 1 tablet (324 mg total) by mouth daily with breakfast. 90 tablet 1    mesalamine (APRISO) 0.375 gram Cp24 Take 4 capsules (1.5 g total) by mouth once daily. 360 capsule 1    metoprolol tartrate (LOPRESSOR) 25 MG tablet Take 1 tablet (25 mg total) by mouth 2 (two) times daily. 90 tablet 3     No current facility-administered medications on file prior to visit.         Objective:     PHYSICAL EXAM:  Vital Signs (Most Recent)  Pulse: 63 (08/20/24 1102)  BP: (!) 142/72 (08/20/24 1102)    Physical Exam:  Gen: no apparent distress, awake and alert  CV: regular rate/rhythm  Pulm: non-labored breathing, equal and bilateral chest rise  Abd: soft, non-distended, non-tender  Ext: warm and well perfused, no edema  Skin: warm and dry, no lesions appreciated  Incision: c/d/I, no surrounding erythema or edema  Neuro: motor and sensation grossly intact and symmetric       Assessment:     Rosanna Live is a 64 y.o. female presenting to clinic today for follow up s/p robotic mesenteric lymph node and small  bowel excision/ biopsy. Current pathology results demonstrated benign adipose tissue and pancreatic heterotopia.     Plan:   - Follow up on all path results.  - Patient is recovering well  - Can advance to a regular diet as tolerated  - We discussed a gradual return to routine activity and exercise as tolerated   - Free to resume normal duty  - RTC PRN

## 2024-08-21 ENCOUNTER — PATIENT MESSAGE (OUTPATIENT)
Dept: SURGERY | Facility: CLINIC | Age: 64
End: 2024-08-21
Payer: MEDICARE

## 2024-08-25 DIAGNOSIS — K21.00 GASTROESOPHAGEAL REFLUX DISEASE WITH ESOPHAGITIS WITHOUT HEMORRHAGE: ICD-10-CM

## 2024-08-26 RX ORDER — FAMOTIDINE 20 MG/1
TABLET, FILM COATED ORAL
Qty: 180 TABLET | Refills: 1 | Status: SHIPPED | OUTPATIENT
Start: 2024-08-26

## 2024-08-29 ENCOUNTER — PATIENT MESSAGE (OUTPATIENT)
Dept: RHEUMATOLOGY | Facility: CLINIC | Age: 64
End: 2024-08-29
Payer: MEDICARE

## 2024-09-09 ENCOUNTER — OFFICE VISIT (OUTPATIENT)
Dept: INTERNAL MEDICINE | Facility: CLINIC | Age: 64
End: 2024-09-09
Payer: MEDICARE

## 2024-09-09 VITALS
DIASTOLIC BLOOD PRESSURE: 60 MMHG | BODY MASS INDEX: 33.27 KG/M2 | SYSTOLIC BLOOD PRESSURE: 118 MMHG | RESPIRATION RATE: 20 BRPM | OXYGEN SATURATION: 96 % | WEIGHT: 206.13 LBS | HEART RATE: 89 BPM

## 2024-09-09 DIAGNOSIS — J32.9 SINUSITIS, UNSPECIFIED CHRONICITY, UNSPECIFIED LOCATION: ICD-10-CM

## 2024-09-09 DIAGNOSIS — R73.9 HYPERGLYCEMIA: ICD-10-CM

## 2024-09-09 DIAGNOSIS — F41.9 ANXIETY: ICD-10-CM

## 2024-09-09 DIAGNOSIS — K22.70 BARRETT'S ESOPHAGUS WITHOUT DYSPLASIA: ICD-10-CM

## 2024-09-09 DIAGNOSIS — R59.0 MESENTERIC LYMPHADENOPATHY: ICD-10-CM

## 2024-09-09 DIAGNOSIS — M25.50 ARTHRALGIA, UNSPECIFIED JOINT: ICD-10-CM

## 2024-09-09 DIAGNOSIS — Z00.00 ANNUAL PHYSICAL EXAM: Primary | ICD-10-CM

## 2024-09-09 DIAGNOSIS — Z15.89 HLA B27 (HLA B27 POSITIVE): ICD-10-CM

## 2024-09-09 DIAGNOSIS — K51.80 OTHER ULCERATIVE COLITIS WITHOUT COMPLICATION: ICD-10-CM

## 2024-09-09 DIAGNOSIS — E78.5 HYPERLIPIDEMIA, UNSPECIFIED HYPERLIPIDEMIA TYPE: ICD-10-CM

## 2024-09-09 PROCEDURE — 1101F PT FALLS ASSESS-DOCD LE1/YR: CPT | Mod: CPTII,S$GLB,, | Performed by: INTERNAL MEDICINE

## 2024-09-09 PROCEDURE — 3288F FALL RISK ASSESSMENT DOCD: CPT | Mod: CPTII,S$GLB,, | Performed by: INTERNAL MEDICINE

## 2024-09-09 PROCEDURE — 3008F BODY MASS INDEX DOCD: CPT | Mod: CPTII,S$GLB,, | Performed by: INTERNAL MEDICINE

## 2024-09-09 PROCEDURE — 3078F DIAST BP <80 MM HG: CPT | Mod: CPTII,S$GLB,, | Performed by: INTERNAL MEDICINE

## 2024-09-09 PROCEDURE — 99999 PR PBB SHADOW E&M-EST. PATIENT-LVL III: CPT | Mod: PBBFAC,,, | Performed by: INTERNAL MEDICINE

## 2024-09-09 PROCEDURE — 1159F MED LIST DOCD IN RCRD: CPT | Mod: CPTII,S$GLB,, | Performed by: INTERNAL MEDICINE

## 2024-09-09 PROCEDURE — 1160F RVW MEDS BY RX/DR IN RCRD: CPT | Mod: CPTII,S$GLB,, | Performed by: INTERNAL MEDICINE

## 2024-09-09 PROCEDURE — 3074F SYST BP LT 130 MM HG: CPT | Mod: CPTII,S$GLB,, | Performed by: INTERNAL MEDICINE

## 2024-09-09 PROCEDURE — 3044F HG A1C LEVEL LT 7.0%: CPT | Mod: CPTII,S$GLB,, | Performed by: INTERNAL MEDICINE

## 2024-09-09 PROCEDURE — 99397 PER PM REEVAL EST PAT 65+ YR: CPT | Mod: S$GLB,,, | Performed by: INTERNAL MEDICINE

## 2024-09-09 RX ORDER — AZITHROMYCIN 250 MG/1
TABLET, FILM COATED ORAL
Qty: 6 TABLET | Refills: 0 | Status: SHIPPED | OUTPATIENT
Start: 2024-09-09

## 2024-09-09 NOTE — PROGRESS NOTES
Subjective:       Patient ID: Rosanna Live is a 64 y.o. female.    Chief Complaint: Annual Exam  This is a 64-year-old who presents today for checkup since last visit she reports that she has been doing fairly well .  She did go to surgery and had biopsy of lymph nodes which showed no significant abnormalities she continues to follow with GI for ulcerative colitis remains on mesalamine and has diarrhea intermittently she also is having issues with arthritis arthralgias triggering finger at times and stiffness when she wakes up she is following with Rheumatology did have HLA B27 antibody positive.  Currently she is having a sinusitis reports that she started with a lot of nasal congestion that is not resolving since she had COVID she reports she feels she is getting sick more often    she is planning on a trip in October    HPI  Review of Systems   HENT:  Positive for congestion and postnasal drip.    Respiratory:  Negative for shortness of breath.    Musculoskeletal:  Positive for arthralgias.       Objective:    Blood pressure 118/60, pulse 89, resp. rate 20, weight 93.5 kg (206 lb 2.1 oz), SpO2 96%.   Physical Exam  Constitutional:       General: She is not in acute distress.  HENT:      Head: Normocephalic.      Comments: Sinusitis      Mouth/Throat:      Pharynx: Oropharynx is clear.   Eyes:      General: No scleral icterus.  Cardiovascular:      Rate and Rhythm: Normal rate and regular rhythm.      Heart sounds: Normal heart sounds. No murmur heard.     No friction rub. No gallop.      Comments: Breast : normal no masses or tenderness      Pulmonary:      Effort: Pulmonary effort is normal. No respiratory distress.      Breath sounds: Normal breath sounds.   Abdominal:      General: Bowel sounds are normal.      Palpations: Abdomen is soft. There is no mass.      Tenderness: There is no abdominal tenderness.   Musculoskeletal:      Cervical back: Neck supple.   Skin:     Findings: No erythema.  "  Neurological:      Mental Status: She is alert.   Psychiatric:         Mood and Affect: Mood normal.         Assessment:       1. Annual physical exam    2. Other ulcerative colitis without complication    3. Hyperlipidemia, unspecified hyperlipidemia type    4. Hyperglycemia    5. Anxiety    6. Mesenteric lymphadenopathy    7. Sinusitis, unspecified chronicity, unspecified location    8. Brito's esophagus without dysplasia    9. HLA B27 (HLA B27 positive)        Plan:       Rosanna Dias" was seen today for annual exam.    Diagnoses and all orders for this visit:    Annual physical exam    Other ulcerative colitis without complication  Following with GI she is due for her follow-up colonoscopy soon and plans to call to schedule with her gastroenterologist  -     Hemoglobin A1C; Future  -     Lipid Panel; Future    Hyperlipidemia, unspecified hyperlipidemia type  Discussed medications with history of arthrosclerosis on imaging studies she would like to update her labs  -     Lipid Panel; Future    Hyperglycemia  Dietary measures update labs and review  -     Hemoglobin A1C; Future    Arthalgia with hla b27 postivie  Following with rheumatology     Anxiety  Stable with Lexapro some family issues but she is doing well    Mesenteric lymphadenopathy  She had biopsy that was negative    Sinusitis, unspecified chronicity, unspecified location  Discussed Flonase saline spray will treat with azithromycin call if no improvement or symptoms persist call if she would like to consider ENT    Brito's esophagus without dysplasia  History of following with GI    Other orders  -     azithromycin (Z-ANABELL) 250 MG tablet; Take two tablets  First day then one tablet daily to complete course    Recommended vaccines when she is better starting with flu and Pneumovax she is not interested in COVID but we also discussed shingles vaccine                "

## 2024-09-25 ENCOUNTER — TELEPHONE (OUTPATIENT)
Dept: INTERNAL MEDICINE | Facility: CLINIC | Age: 64
End: 2024-09-25
Payer: MEDICARE

## 2024-09-25 ENCOUNTER — TELEPHONE (OUTPATIENT)
Dept: ENDOSCOPY | Facility: HOSPITAL | Age: 64
End: 2024-09-25
Payer: MEDICARE

## 2024-09-25 ENCOUNTER — TELEPHONE (OUTPATIENT)
Dept: OBSTETRICS AND GYNECOLOGY | Facility: CLINIC | Age: 64
End: 2024-09-25
Payer: MEDICARE

## 2024-09-25 VITALS — HEIGHT: 66 IN | BODY MASS INDEX: 33.11 KG/M2 | WEIGHT: 206 LBS

## 2024-09-25 DIAGNOSIS — Z12.11 SCREEN FOR COLON CANCER: Primary | ICD-10-CM

## 2024-09-25 DIAGNOSIS — K51.919 ULCERATIVE COLITIS WITH COMPLICATION, UNSPECIFIED LOCATION: Primary | ICD-10-CM

## 2024-09-25 DIAGNOSIS — E78.49 OTHER HYPERLIPIDEMIA: Primary | ICD-10-CM

## 2024-09-25 RX ORDER — ROSUVASTATIN CALCIUM 10 MG/1
10 TABLET, COATED ORAL DAILY
Qty: 90 TABLET | Refills: 3 | Status: SHIPPED | OUTPATIENT
Start: 2024-09-25 | End: 2025-09-25

## 2024-09-25 RX ORDER — SODIUM, POTASSIUM,MAG SULFATES 17.5-3.13G
1 SOLUTION, RECONSTITUTED, ORAL ORAL DAILY
Qty: 1 KIT | Refills: 0 | Status: SHIPPED | OUTPATIENT
Start: 2024-09-25 | End: 2024-09-27

## 2024-09-25 NOTE — TELEPHONE ENCOUNTER
----- Message from Geena Roman MD sent at 9/25/2024  8:05 AM CDT -----  Called discussed with pt trial of statin  She is agreeable rosuvastatin 10 mg risk benefits reveiwe    Recheck labs in mid November on new medications  Please scheudle lipid and hapatic in November

## 2024-09-25 NOTE — TELEPHONE ENCOUNTER
Spoke to Rosanna to schedule procedure(s) Colonoscopy       Physician to perform procedure(s) Dr. LANE Barber  Date of Procedure (s) 10/24/24  Arrival Time 9:30 AM  Time of Procedure(s) 10:30 AM   Location of Procedure(s) [facility] [address]  Type of Rx Prep sent to patient: Suprep  Instructions provided to patient via [facility]    Patient was informed on the following information and verbalized understanding. Screening questionnaire reviewed with patient and complete. If procedure requires anesthesia, a responsible adult needs to be present to accompany the patient home, patient cannot drive after receiving anesthesia. Appointment details are tentative, especially check-in time. Patient will receive a prep-op call 7 days prior to confirm check-in time for procedure. If applicable the patient should contact their pharmacy to verify Rx for procedure prep is ready for pick-up. Patient was advised to call the scheduling department at [phone] if pharmacy states no Rx is available. Patient was advised to call the endoscopy scheduling department if any questions or concerns arise.      [facility] Scheduling Department

## 2024-09-25 NOTE — TELEPHONE ENCOUNTER
Pt call was return.. Pt stated she didn't leave any message stating she need some1 to return her call

## 2024-09-25 NOTE — TELEPHONE ENCOUNTER
"----- Message from Regina Gaffney sent at 2024 12:09 PM CDT -----    ----- Message -----  From: Carlos Manuel Barber MD  Sent: 9/10/2024   8:00 AM CDT  To: [facility]    Procedure: Colonoscopy    Diagnosis:  Ulcerative colitis follow-up to check for healing    Procedure Timin-12 weeks ( about 6 months from her last 1 which would be 2024)    #If within 4 weeks selected, please rashida as high priority#    #If greater than 12 weeks, please select "5-12 weeks" and delay sending until 3 months prior to requested date#    Location: [address]    Additional Scheduling Information: No scheduling concerns    Prep Specifications:Standard prep    Is the patient taking a GLP-1 Agonist:no    Have you attached a patient to this message: yes  "

## 2024-09-26 ENCOUNTER — TELEPHONE (OUTPATIENT)
Dept: INTERNAL MEDICINE | Facility: CLINIC | Age: 64
End: 2024-09-26
Payer: MEDICARE

## 2024-10-02 NOTE — PROGRESS NOTES
HISTORY OF PRESENT ILLNESS:    Rosanna Live is a 64 y.o. female , presents for a routine exam.  Denies GYN complaints. She denies vaginal bleeding, vasomotor symptoms, vaginal dryness.  She does not want STD screening.    The patient participates in regular exercise: No.  The patient does not smoke.  The patient wears seatbelts.   Pt denies any domestic violence.  No tattoos or blood transfusions.      SCREENING HISTORY:  PAP:  NILM - hpv 2019 pap nilm \\  nilm \\  nilm   MAMMOGRAM:  wnl               TC:  6.12  COLONOSCOPY:   Dexa:      Gyn FH:  Breast cancer: none  Colon cancer: none  Ovarian cancer: none  Endometrial cancer: none       Rosanna Live is a 63 y.o. female , presents for a routine exam.  Denies GYN complaints. She desnies vaginal bleeding, vasomotor symptoms, vaginal dryness.  She does not want STD screening.    LAST   Rosanna Live is a 62 y.o. female , presents for a routine exam.  No GYN complaints. She DENIES vaginal bleeding, vasomotor symptoms, vaginal dryness.  She does not want STD screening.    The patient participates in regular exercise: NO.  The patient does not smoke.  The patient wears seatbelts.   Pt denies any domestic violence.  NO-  tattoos or blood transfusions.   Last   COTEST PLANNED  AND MAMMO ORDERED.  NO GYN PROBLEMS.  DISCUSSED HER AND HER DAUGHTER GETTING THE COVID VACCINE  :   MAMMO ORDER AND NL PAP .  NO GYN C/O.  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 [employer], MAKING PLANS FOR FALL    Past Medical History:   Diagnosis Date    Allergy     Brito's esophagus     Basal cell cancer     Cataract     Diverticulosis     Edema     chronic right foot     Hiatal hernia 2017    IBS (irritable bowel syndrome)     Obesity     Rectovaginal fistula 2012     Vitamin D deficiency disease        Past Surgical History:   Procedure Laterality Date    CHOLECYSTECTOMY      COLONOSCOPY      COLONOSCOPY N/A 7/11/2022    Procedure: COLONOSCOPY;  Surgeon: Carlos Manuel Barber MD;  Location: [facility] (4TH FLR);  Service: Endoscopy;  Laterality: N/A;  golytely    COLONOSCOPY N/A 4/16/2024    Procedure: COLONOSCOPY;  Surgeon: Carlos Manuel Barber MD;  Location: [facility] ENDO (4TH FLR);  Service: Endoscopy;  Laterality: N/A;    ESOPHAGOGASTRODUODENOSCOPY      ESOPHAGOGASTRODUODENOSCOPY N/A 9/18/2019    Procedure: EGD (ESOPHAGOGASTRODUODENOSCOPY);  Surgeon: Carlos Manuel Barber MD;  Location: [facility] (4TH FLR);  Service: Endoscopy;  Laterality: N/A;  evaluate fundoplication    ESOPHAGOGASTRODUODENOSCOPY N/A 7/11/2022    Procedure: EGD (ESOPHAGOGASTRODUODENOSCOPY);  Surgeon: Carlos Manuel Barber MD;  Location: [facility] (4TH FLR);  Service: Endoscopy;  Laterality: N/A;  Covid test 7/8 [facility], instructions sent to [email or web address]    ESOPHAGOGASTRODUODENOSCOPY N/A 4/16/2024    Procedure: EGD (ESOPHAGOGASTRODUODENOSCOPY);  Surgeon: Carlos Manuel Barber MD;  Location: [facility] (4TH FLR);  Service: Endoscopy;  Laterality: N/A;  Suprep  Referral Dr. Barber  prep instructions given to pt in clinic  4/11-precall complete-MS    INGUINAL HERNIA REPAIR      LAPAROSCOPIC BIOPSY OF PARA-AORTIC MASS N/A 8/7/2024    Procedure: XI Robotic BIOPSY, MASS, PARA-AORTIC, LAPAROSCOPIC, mesenteric lymph node biopsy with poss sm bowel biopsy;  Surgeon: Sanket Gallagher MD;  Location: [facility] OR 2ND FLR;  Service: General;  Laterality: N/A;  also needs immunohistologic stain for IgG4 plasma cells, flow cytometry for hematologic malignancy and TB stains and cultures (per Dr. Gallagher)    LAPAROSCOPIC NISSEN FUNDOPLICATION  2/15/16    Mohs' procedure of face      RECTO VAGINAL FISTULA  2011, 2012    UMBILICAL HERNIA REPAIR          MEDICATIONS AND ALLERGIES:      Current Outpatient Medications:     EScitalopram oxalate  (LEXAPRO) 10 MG tablet, Take 1 tablet (10 mg total) by mouth once daily., Disp: 30 tablet, Rfl: 11    famotidine (PEPCID) 20 MG tablet, TAKE TWO TABLETS BY MOUTH DAILY IN THE EVENING., Disp: 180 tablet, Rfl: 1    mesalamine (APRISO) 0.375 gram Cp24, Take 4 capsules (1.5 g total) by mouth once daily., Disp: 360 capsule, Rfl: 1    metoprolol tartrate (LOPRESSOR) 25 MG tablet, Take 1 tablet (25 mg total) by mouth 2 (two) times daily., Disp: 90 tablet, Rfl: 3    rosuvastatin (CRESTOR) 10 MG tablet, Take 1 tablet (10 mg total) by mouth once daily., Disp: 90 tablet, Rfl: 3    Review of patient's allergies indicates:   Allergen Reactions    Penicillins Rash       Family History   Problem Relation Name Age of Onset    Miscarriages / Stillbirths Mother      Diabetes Mother      Hypertension Mother      Glaucoma Mother      Cataracts Mother      Arthritis Father          Rhematoid    Hyperlipidemia Father      Cataracts Father      Glaucoma Father      Heart disease Father      No Known Problems Sister      No Known Problems Brother      Heart disease Maternal Grandmother      No Known Problems Maternal Grandfather      No Known Problems Paternal Grandmother      No Known Problems Paternal Grandfather      Heart disease Maternal Aunt      No Known Problems Maternal Uncle      No Known Problems Paternal Aunt      No Known Problems Paternal Uncle      Cancer Daughter daughter         amyloidosis    No Known Problems Son      Breast cancer Neg Hx      Colon cancer Neg Hx      Ovarian cancer Neg Hx         Social History     Socioeconomic History    Marital status:    Occupational History    Occupation: Teacher     Employer: [employer]   Tobacco Use    Smoking status: Former     Current packs/day: 0.00     Average packs/day: 0.3 packs/day for 5.0 years (1.3 ttl pk-yrs)     Types: Cigarettes     Start date: 1978     Quit date: 1983     Years since quittin.1    Smokeless tobacco: Never    Substance and Sexual Activity    Alcohol use: Yes     Alcohol/week: 1.0 standard drink of alcohol     Types: 1 Glasses of wine per week     Comment: Social    Drug use: No    Sexual activity: Never     Partners: Male     Birth control/protection: None, Abstinence   Social History Narrative         Social Drivers of Health     Financial Resource Strain: Low Risk  (4/24/2024)    Overall Financial Resource Strain (CARDIA)     Difficulty of Paying Living Expenses: Not hard at all   Food Insecurity: No Food Insecurity (4/24/2024)    Hunger Vital Sign     Worried About Running Out of Food in the Last Year: Never true     Ran Out of Food in the Last Year: Never true   Transportation Needs: No Transportation Needs (4/24/2024)    PRAPARE - Transportation     Lack of Transportation (Medical): No     Lack of Transportation (Non-Medical): No   Physical Activity: Inactive (4/24/2024)    Exercise Vital Sign     Days of Exercise per Week: 0 days     Minutes of Exercise per Session: 0 min   Stress: Stress Concern Present (4/24/2024)    [employer] - Occupational Stress Questionnaire     Feeling of Stress : To some extent   Housing Stability: Low Risk  (4/10/2023)    Housing Stability Vital Sign     Unable to Pay for Housing in the Last Year: No     Number of Places Lived in the Last Year: 1     Unstable Housing in the Last Year: No       COMPREHENSIVE GYN HISTORY:    PAP History:  Denies abnormal Paps   Infection History: Denies STDs. Denies PID.  Benign History: Denies uterine fibroids. Denies ovarian cysts. Denies endometriosis. Denies other conditions.  Cancer History: Denies cervical cancer. Denies uterine cancer or hyperplasia. Denies ovarian cancer. Denies vulvar cancer or pre-cancer. Denies vaginal cancer or pre-cancer. Denies breast cancer. Denies colon cancer.    ROS:  GENERAL: No weight changes. No swelling. No fatigue. No fever.  CARDIOVASCULAR: No chest pain. No shortness of breath.  No leg cramps.   NEUROLOGICAL: No headaches. No vision changes.  BREASTS: No pain. No lumps. No discharge.  ABDOMEN: No pain. No nausea. No vomiting. No diarrhea. No constipation.  REPRODUCTIVE: no  abnormal bleeding.   VULVA: No pain. No lesions. No itching.   VAGINA: No relaxation. No itching. No odor. No discharge. No lesions.   URINARY: No incontinence. No nocturia. No frequency. No dysuria.    /64   Wt 93 kg (205 lb 0.4 oz)   LMP  (LMP Unknown)   BMI 33.09 kg/m²     PE:  APPEARANCE: Well nourished, well developed, in no acute distress.  AFFECT: WNL, alert and oriented x 3.  SKIN: No hirsutism or acne.  NECK: Neck symmetric without masses or thyromegaly.  NODES: No inguinal, cervical, axillary or femoral lymph node enlargement.  CHEST: Good respiratory effort.   ABDOMEN: Soft. No tenderness or masses. No hepatosplenomegaly. No hernias.  BREASTS: Symmetrical, no skin changes or visible lesions. No nipple discharge bilaterally. + TTP TO LEFT OUTER UPPER BREAST/AXILLARY TAIL WITH 2CM MASS   PELVIC: ATROPHIC EXTERNAL FEMALE GENITALIA without lesions. Normal hair distribution. Adequate perineal body, normal urethral meatus. VAGINA DRY without lesions or discharge. CERVIX STENOTIC without lesions, discharge or tenderness. No significant cystocele or rectocele. Bimanual exam shows uterus to be normal size, regular, mobile and nontender. Adnexa without masses or tenderness.  EXTREMITIES: No edema.    DIAGNOSIS:  1. Women's annual routine gynecological examination        2. Screening breast examination        3. Screening for human papillomavirus (HPV)  HPV High Risk Genotypes, PCR      4. Encounter for Papanicolaou smear for cervical cancer screening  Liquid-Based Pap Smear, Screening      5. Breast pain, left  Mammo Digital Diagnostic Left with Joe      6. Mass of upper outer quadrant of left breast  Mammo Digital Diagnostic Left with Joe            PLAN:  - Pap and HPV done today.  - Screening tests as  ordered.  - Diet and exercise encouraged.  Seat belt use encouraged.    Counseling: indications for and frequency of periodic gynecologic exam    Counseling on osteoporosis prevention, calcium supplementation, and regular weight bearing exercise listed in AVS.     FOLLOW-UP with me annually.   Zoraida Agustin PA-C

## 2024-10-03 ENCOUNTER — OFFICE VISIT (OUTPATIENT)
Dept: OBSTETRICS AND GYNECOLOGY | Facility: CLINIC | Age: 64
End: 2024-10-03
Payer: MEDICARE

## 2024-10-03 VITALS — SYSTOLIC BLOOD PRESSURE: 120 MMHG | BODY MASS INDEX: 33.09 KG/M2 | WEIGHT: 205 LBS | DIASTOLIC BLOOD PRESSURE: 64 MMHG

## 2024-10-03 DIAGNOSIS — N64.4 BREAST PAIN, LEFT: ICD-10-CM

## 2024-10-03 DIAGNOSIS — Z11.51 SCREENING FOR HUMAN PAPILLOMAVIRUS (HPV): ICD-10-CM

## 2024-10-03 DIAGNOSIS — Z12.4 ENCOUNTER FOR PAPANICOLAOU SMEAR FOR CERVICAL CANCER SCREENING: ICD-10-CM

## 2024-10-03 DIAGNOSIS — Z12.39 SCREENING BREAST EXAMINATION: ICD-10-CM

## 2024-10-03 DIAGNOSIS — Z01.419 WOMEN'S ANNUAL ROUTINE GYNECOLOGICAL EXAMINATION: Primary | ICD-10-CM

## 2024-10-03 DIAGNOSIS — N63.21 MASS OF UPPER OUTER QUADRANT OF LEFT BREAST: ICD-10-CM

## 2024-10-03 PROCEDURE — 87624 HPV HI-RISK TYP POOLED RSLT: CPT | Performed by: PHYSICIAN ASSISTANT

## 2024-10-03 PROCEDURE — 88175 CYTOPATH C/V AUTO FLUID REDO: CPT | Performed by: PHYSICIAN ASSISTANT

## 2024-10-03 PROCEDURE — 99999 PR PBB SHADOW E&M-EST. PATIENT-LVL III: CPT | Mod: PBBFAC,,, | Performed by: PHYSICIAN ASSISTANT

## 2024-10-03 NOTE — PATIENT INSTRUCTIONS
Recommend healthy life style choices including diet and exercise, calcium and vitamin D supplementation daily, healthy sexual decision making, development of healthy and safe relationships.    Calcium and Vitamin D Recommendation:   Persons nine to 18 years of age: 1,300 mg of calcium, 600 IU of vitamin D  Persons 19 to 50 years of age: 1,000 mg of calcium, 600 IU of vitamin D  Persons 51 to 70 years of age: 1,200 mg of calcium, 600 IU of vitamin D  Persons 71 years and older: 1,200 mg of calcium, 800 IU of vitamin D    I recommend a daily vitamin.

## 2024-10-04 LAB
CLINICAL INFO: NORMAL
DATE OF PREVIOUS PAP: NORMAL
DATE PREVIOUS BX: NO
LMP START DATE: NORMAL
SPECIMEN SOURCE CVX/VAG CYTO: NORMAL

## 2024-10-06 ENCOUNTER — PATIENT MESSAGE (OUTPATIENT)
Dept: GASTROENTEROLOGY | Facility: CLINIC | Age: 64
End: 2024-10-06
Payer: MEDICARE

## 2024-10-07 ENCOUNTER — PATIENT MESSAGE (OUTPATIENT)
Dept: GASTROENTEROLOGY | Facility: CLINIC | Age: 64
End: 2024-10-07
Payer: MEDICARE

## 2024-10-07 ENCOUNTER — TELEPHONE (OUTPATIENT)
Dept: ENDOSCOPY | Facility: HOSPITAL | Age: 64
End: 2024-10-07
Payer: MEDICARE

## 2024-10-07 NOTE — TELEPHONE ENCOUNTER
----- Message from MADIHA Sibley sent at 9/30/2024  2:55 PM CDT -----  Regarding: FW: Appt Access  Contact: pt [phone]    ----- Message -----  From: Yoana Daley MA  Sent: 9/30/2024   9:10 AM CDT  To: [facility] Endoscopy Schedulers  Subject: FW: Appt Access                                    ----- Message -----  From: Mini Mcclain  Sent: 9/30/2024   8:58 AM CDT  To: [facility] Staff  Subject: Appt Access                                      Patient [place] Pls call

## 2024-10-10 RX ORDER — METOPROLOL TARTRATE 25 MG/1
25 TABLET, FILM COATED ORAL 2 TIMES DAILY
Qty: 180 TABLET | Refills: 3 | Status: SHIPPED | OUTPATIENT
Start: 2024-10-10

## 2024-10-28 ENCOUNTER — OFFICE VISIT (OUTPATIENT)
Dept: RHEUMATOLOGY | Facility: CLINIC | Age: 64
End: 2024-10-28
Payer: MEDICARE

## 2024-10-28 VITALS
WEIGHT: 209.19 LBS | HEIGHT: 66 IN | HEART RATE: 69 BPM | BODY MASS INDEX: 33.62 KG/M2 | SYSTOLIC BLOOD PRESSURE: 123 MMHG | DIASTOLIC BLOOD PRESSURE: 76 MMHG

## 2024-10-28 DIAGNOSIS — M65.332 TRIGGER MIDDLE FINGER OF LEFT HAND: Primary | ICD-10-CM

## 2024-10-28 PROCEDURE — 20550 NJX 1 TENDON SHEATH/LIGAMENT: CPT | Mod: LT,S$GLB,, | Performed by: STUDENT IN AN ORGANIZED HEALTH CARE EDUCATION/TRAINING PROGRAM

## 2024-10-28 PROCEDURE — 99499 UNLISTED E&M SERVICE: CPT | Mod: S$GLB,,, | Performed by: STUDENT IN AN ORGANIZED HEALTH CARE EDUCATION/TRAINING PROGRAM

## 2024-10-28 RX ORDER — TRIAMCINOLONE ACETONIDE 40 MG/ML
20 INJECTION, SUSPENSION INTRA-ARTICULAR; INTRAMUSCULAR
Status: DISCONTINUED | OUTPATIENT
Start: 2024-10-28 | End: 2024-10-28 | Stop reason: HOSPADM

## 2024-10-28 RX ORDER — LIDOCAINE HYDROCHLORIDE 10 MG/ML
0.5 INJECTION, SOLUTION EPIDURAL; INFILTRATION; INTRACAUDAL; PERINEURAL
Status: DISCONTINUED | OUTPATIENT
Start: 2024-10-28 | End: 2024-10-28 | Stop reason: HOSPADM

## 2024-10-28 RX ADMIN — LIDOCAINE HYDROCHLORIDE 0.5 ML: 10 INJECTION, SOLUTION EPIDURAL; INFILTRATION; INTRACAUDAL; PERINEURAL at 08:10

## 2024-10-28 RX ADMIN — TRIAMCINOLONE ACETONIDE 20 MG: 40 INJECTION, SUSPENSION INTRA-ARTICULAR; INTRAMUSCULAR at 08:10

## 2024-10-29 ENCOUNTER — TELEPHONE (OUTPATIENT)
Dept: ENDOSCOPY | Facility: HOSPITAL | Age: 64
End: 2024-10-29
Payer: MEDICARE

## 2024-10-30 ENCOUNTER — TELEPHONE (OUTPATIENT)
Dept: ENDOSCOPY | Facility: HOSPITAL | Age: 64
End: 2024-10-30
Payer: MEDICARE

## 2024-11-05 ENCOUNTER — ANESTHESIA (OUTPATIENT)
Dept: ENDOSCOPY | Facility: HOSPITAL | Age: 64
End: 2024-11-05
Payer: MEDICARE

## 2024-11-05 ENCOUNTER — HOSPITAL ENCOUNTER (OUTPATIENT)
Facility: HOSPITAL | Age: 64
Discharge: HOME | End: 2024-11-05
Attending: INTERNAL MEDICINE | Admitting: INTERNAL MEDICINE
Payer: MEDICARE

## 2024-11-05 ENCOUNTER — ANESTHESIA EVENT (OUTPATIENT)
Dept: ENDOSCOPY | Facility: HOSPITAL | Age: 64
End: 2024-11-05
Payer: MEDICARE

## 2024-11-05 VITALS
HEART RATE: 75 BPM | DIASTOLIC BLOOD PRESSURE: 64 MMHG | SYSTOLIC BLOOD PRESSURE: 109 MMHG | OXYGEN SATURATION: 97 % | RESPIRATION RATE: 16 BRPM | TEMPERATURE: 98 F

## 2024-11-05 DIAGNOSIS — K52.9 COLITIS: ICD-10-CM

## 2024-11-05 PROCEDURE — 45378 DIAGNOSTIC COLONOSCOPY: CPT | Mod: ,,, | Performed by: INTERNAL MEDICINE

## 2024-11-05 PROCEDURE — 37000009 HC ANESTHESIA EA ADD 15 MINS: Performed by: INTERNAL MEDICINE

## 2024-11-05 PROCEDURE — 37000008 HC ANESTHESIA 1ST 15 MINUTES: Performed by: INTERNAL MEDICINE

## 2024-11-05 PROCEDURE — 45378 DIAGNOSTIC COLONOSCOPY: CPT | Performed by: INTERNAL MEDICINE

## 2024-11-05 PROCEDURE — 63600175 PHARM REV CODE 636 W HCPCS: Performed by: REGISTERED NURSE

## 2024-11-05 RX ORDER — SODIUM CHLORIDE 9 MG/ML
INJECTION, SOLUTION INTRAVENOUS CONTINUOUS
Status: DISCONTINUED | OUTPATIENT
Start: 2024-11-05 | End: 2024-11-05 | Stop reason: HOSPADM

## 2024-11-05 RX ORDER — PROPOFOL 10 MG/ML
INJECTION, EMULSION INTRAVENOUS CONTINUOUS PRN
Status: DISCONTINUED | OUTPATIENT
Start: 2024-11-05 | End: 2024-11-05

## 2024-11-05 RX ORDER — LIDOCAINE HYDROCHLORIDE 20 MG/ML
INJECTION INTRAVENOUS
Status: DISCONTINUED | OUTPATIENT
Start: 2024-11-05 | End: 2024-11-05

## 2024-11-05 RX ORDER — PROPOFOL 10 MG/ML
VIAL (ML) INTRAVENOUS
Status: DISCONTINUED | OUTPATIENT
Start: 2024-11-05 | End: 2024-11-05

## 2024-11-05 RX ADMIN — LIDOCAINE HYDROCHLORIDE 40 MG: 20 INJECTION INTRAVENOUS at 10:11

## 2024-11-05 RX ADMIN — PROPOFOL 175 MCG/KG/MIN: 10 INJECTION, EMULSION INTRAVENOUS at 10:11

## 2024-11-05 RX ADMIN — PROPOFOL 30 MG: 10 INJECTION, EMULSION INTRAVENOUS at 10:11

## 2024-11-05 RX ADMIN — PROPOFOL 70 MG: 10 INJECTION, EMULSION INTRAVENOUS at 10:11

## 2024-11-05 NOTE — TRANSFER OF CARE
Anesthesia Transfer of Care Note    Patient: Rosanna Live    Procedure(s) Performed: Procedure(s) (LRB):  COLONOSCOPY (N/A)    Patient location: GI    Anesthesia Type: general    Transport from OR: Transported from OR on room air with adequate spontaneous ventilation    Post pain: adequate analgesia    Post assessment: no apparent anesthetic complications and tolerated procedure well    Post vital signs: stable    Level of consciousness: awake and alert    Nausea/Vomiting: no nausea/vomiting    Complications: none    Transfer of care protocol was followed      Last vitals: Visit Vitals  BP 91/60 (BP Location: Left arm)   Pulse 76   Temp 36.7 °C (98 °F) (Temporal)   Resp 16   LMP  (LMP Unknown)   SpO2 95%   Breastfeeding No

## 2024-11-05 NOTE — H&P
[facility] 4th [facility]  History & Physical    Subjective:      Chief Complaint/Reason for Admission:     Colonoscopy for follow up colitis    Rosanna Live is a 64 y.o. female.    Past Medical History:   Diagnosis Date    Allergy     Brito's esophagus     Basal cell cancer     Cataract     Diverticulosis     Edema     chronic right foot     Hiatal hernia 1/31/2017    IBS (irritable bowel syndrome)     Obesity     Rectovaginal fistula 6/28/2012    Vitamin D deficiency disease      Past Surgical History:   Procedure Laterality Date    CHOLECYSTECTOMY      COLONOSCOPY      COLONOSCOPY N/A 7/11/2022    Procedure: COLONOSCOPY;  Surgeon: Carlos Manuel Barber MD;  Location: [facility] (4TH FLR);  Service: Endoscopy;  Laterality: N/A;  golytely    COLONOSCOPY N/A 4/16/2024    Procedure: COLONOSCOPY;  Surgeon: Carlos Manuel Barber MD;  [facility]  Service: Endoscopy;  Laterality: N/A;    ESOPHAGOGASTRODUODENOSCOPY      ESOPHAGOGASTRODUODENOSCOPY N/A 9/18/2019    Procedure: EGD (ESOPHAGOGASTRODUODENOSCOPY);  Surgeon: Carlos Manuel Barber MD;  [facility]  Service: Endoscopy;  Laterality: N/A;  evaluate fundoplication    ESOPHAGOGASTRODUODENOSCOPY N/A 7/11/2022    Procedure: EGD (ESOPHAGOGASTRODUODENOSCOPY);  Surgeon: Carlos Manuel Barber MD;  [facility]  Service: Endoscopy;  Laterality: N/A;  Covid test 7/8 [facility], instructions sent to [facility]    ESOPHAGOGASTRODUODENOSCOPY N/A 4/16/2024    Procedure: EGD (ESOPHAGOGASTRODUODENOSCOPY);  Surgeon: Carlos Manuel Barber MD;  Location: [facility] ([facility] FLR);  Service: Endoscopy;  Laterality: N/A;  Suprep  Referral Dr. Barber  prep instructions given to pt in clinic  4/11-precall complete-MS    INGUINAL HERNIA REPAIR      LAPAROSCOPIC BIOPSY OF PARA-AORTIC MASS N/A 8/7/2024    Procedure: XI Robotic BIOPSY, MASS, PARA-AORTIC, LAPAROSCOPIC, mesenteric lymph node biopsy with poss sm bowel biopsy;  Surgeon:  Sanket Gallagher MD;  Location: [facility] OR [address] [place] [address];  Service: General;  Laterality: N/A;  also needs immunohistologic stain for IgG4 plasma cells, flow cytometry for hematologic malignancy and TB stains and cultures (per Dr. Gallagher)    LAPAROSCOPIC NISSEN FUNDOPLICATION  2/15/16    Mohs' procedure of face      RECTO VAGINAL FISTULA  ,     UMBILICAL HERNIA REPAIR       Social History     Tobacco Use    Smoking status: Former     Current packs/day: 0.00     Average packs/day: 0.3 packs/day for 5.0 years (1.3 ttl pk-yrs)     Types: Cigarettes     Start date: 1978     Quit date: 1983     Years since quittin.2    Smokeless tobacco: Never   Substance Use Topics    Alcohol use: Yes     Alcohol/week: 1.0 standard drink of alcohol     Types: 1 Glasses of wine per week     Comment: Social    Drug use: No       PTA Medications   Medication Sig    EScitalopram oxalate (LEXAPRO) 10 MG tablet Take 1 tablet (10 mg total) by mouth once daily.    famotidine (PEPCID) 20 MG tablet TAKE TWO TABLETS BY MOUTH DAILY IN THE EVENING.    metoprolol tartrate (LOPRESSOR) 25 MG tablet TAKE 1 TABLET BY MOUTH TWICE A DAY    mesalamine (APRISO) 0.375 gram Cp24 Take 4 capsules (1.5 g total) by mouth once daily.    rosuvastatin (CRESTOR) 10 MG tablet Take 1 tablet (10 mg total) by mouth once daily.     Review of patient's allergies indicates:   Allergen Reactions    Penicillins Rash        Review of Systems   Constitutional:  Negative for fever.       Objective:      Vital Signs (Most Recent)       Vital Signs Range (Last 24H):       Physical Exam  Neurological:      Mental Status: She is alert and oriented to person, place, and time.         Assessment:      Colonoscopy for follow up colitis      Plan:    Colonoscopy for follow up colitis

## 2024-11-05 NOTE — ANESTHESIA POSTPROCEDURE EVALUATION
Anesthesia Post Evaluation    Patient: Rosanna Live    Procedure(s) Performed: Procedure(s) (LRB):  COLONOSCOPY (N/A)    Final Anesthesia Type: general      Patient location during evaluation: GI PACU  Patient participation: Yes- Able to Participate  Level of consciousness: awake and alert and oriented  Post-procedure vital signs: reviewed and stable  Pain management: adequate  Airway patency: patent    PONV status at discharge: No PONV  Anesthetic complications: no      Cardiovascular status: stable  Respiratory status: unassisted, spontaneous ventilation and room air  Hydration status: euvolemic  Follow-up not needed.          Vitals Value Taken Time   BP 91/60 11/05/24 1107   Temp 36.7 °C (98 °F) 11/05/24 1107   Pulse 76 11/05/24 1107   Resp 16 11/05/24 1107   SpO2 95 % 11/05/24 1107         No case tracking events are documented in the log.      Pain/Shadia Score: Shadia Score: 9 (11/5/2024 11:08 AM)

## 2024-11-05 NOTE — PROVATION PATIENT INSTRUCTIONS
Discharge Summary/Instructions after an Endoscopic Procedure  Patient Name: Rosanna Live  Patient MRN: [id number]  Patient   YOB: 1960 Tuesday, November 5, 2024  Carlos Manuel Barber MD  Dear patient,  As a result of recent federal legislation (The Federal Cures Act), you may   receive lab or pathology results from your procedure in your [facility]   account before your physician is able to contact you. Your physician or   their representative will relay the results to you with their   recommendations at their soonest availability.  Thank you,  RESTRICTIONS:  During your procedure today, you received medications for sedation.  These   medications may affect your judgment, balance and coordination.  Therefore,   for 24 hours, you have the following restrictions:   - DO NOT drive a car, operate machinery, make legal/financial decisions,   sign important papers or drink alcohol.    ACTIVITY:  Today: no heavy lifting, straining or running due to procedural   sedation/anesthesia.  The following day: return to full activity including work.  DIET:  Eat and drink normally unless instructed otherwise.     TREATMENT FOR COMMON SIDE EFFECTS:  - Mild abdominal pain, nausea, belching, bloating or excessive gas:  rest,   eat lightly and use a heating pad.  - Sore Throat: treat with throat lozenges and/or gargle with warm salt   water.  - Because air was used during the procedure, expelling large amounts of air   from your rectum or belching is normal.  - If a bowel prep was taken, you may not have a bowel movement for 1-3 days.    This is normal.  SYMPTOMS TO WATCH FOR AND REPORT TO YOUR PHYSICIAN:  1. Abdominal pain or bloating, other than gas cramps.  2. Chest pain.  3. Back pain.  4. Signs of infection such as: chills or fever occurring within 24 hours   after the procedure.  5. Rectal bleeding, which would show as bright red, maroon, or black stools.   (A tablespoon of blood from the rectum is not serious,  especially if   hemorrhoids are present.)  6. Vomiting.  7. Weakness or dizziness.  GO DIRECTLY TO THE NEAREST EMERGENCY ROOM IF YOU HAVE ANY OF THE FOLLOWING:      Difficulty breathing              Chills and/or fever over 101 F   Persistent vomiting and/or vomiting blood   Severe abdominal pain   Severe chest pain   Black, tarry stools   Bleeding- more than one tablespoon   Any other symptom or condition that you feel may need urgent attention  Your doctor recommends these additional instructions:  If any biopsies were taken, your doctors clinic will contact you in 1 to 2   weeks with any results.  - Discharge patient to home.   - Repeat colonoscopy in 2 years for surveillance.   - Return to GI clinic at the next available appointment.   - The findings and recommendations were discussed with the patient.  For questions, problems or results please call your physician - Carlos Manuel Barber MD at Work:  [phone].  [facility] PHONE NUMBER: [phone]  IF A COMPLICATION OR EMERGENCY SITUATION ARISES AND YOU ARE UNABLE TO REACH   YOUR PHYSICIAN - GO DIRECTLY TO THE EMERGENCY ROOM.  Carlos Manuel Barber MD  11/5/2024 11:11:13 AM  This report has been verified and signed electronically.  Dear patient,  As a result of recent federal legislation (The Federal Cures Act), you may   receive lab or pathology results from your procedure in your [facility]   account before your physician is able to contact you. Your physician or   their representative will relay the results to you with their   recommendations at their soonest availability.  Thank you,  PROVATION

## 2024-11-05 NOTE — ANESTHESIA PREPROCEDURE EVALUATION
11/05/2024  Rosanna Live is a 64 y.o., female.  Past Medical History:   Diagnosis Date    Allergy     Brito's esophagus     Basal cell cancer     Cataract     Diverticulosis     Edema     chronic right foot     Hiatal hernia 1/31/2017    IBS (irritable bowel syndrome)     Obesity     Rectovaginal fistula 6/28/2012    Vitamin D deficiency disease      Past Surgical History:   Procedure Laterality Date    CHOLECYSTECTOMY      COLONOSCOPY      COLONOSCOPY N/A 7/11/2022    Procedure: COLONOSCOPY;  Surgeon: Carlos Manuel Barber MD;  Location: [facility] ([address] [place] [address]);  Service: Endoscopy;  Laterality: N/A;  golytely    COLONOSCOPY N/A 4/16/2024    Procedure: COLONOSCOPY;  Surgeon: Carlos Manuel Barber MD;  [facility]  Service: Endoscopy;  Laterality: N/A;    ESOPHAGOGASTRODUODENOSCOPY      ESOPHAGOGASTRODUODENOSCOPY N/A 9/18/2019    Procedure: EGD (ESOPHAGOGASTRODUODENOSCOPY);  Surgeon: Carlos Manuel Barber MD;  Location: [facility] ([address] [place] [address]);  Service: Endoscopy;  Laterality: N/A;  evaluate fundoplication    ESOPHAGOGASTRODUODENOSCOPY N/A 7/11/2022    Procedure: EGD (ESOPHAGOGASTRODUODENOSCOPY);  Surgeon: Carlos Manuel Barber MD;  [facility]  Service: Endoscopy;  Laterality: N/A;  Covid test 7/8 [facility], instructions sent to [facility]    ESOPHAGOGASTRODUODENOSCOPY N/A 4/16/2024    Procedure: EGD (ESOPHAGOGASTRODUODENOSCOPY);  Surgeon: Carlos Manuel Barber MD;  [facility]  Service: Endoscopy;  Laterality: N/A;  Suprep  Referral Dr. Barber  prep instructions given to pt in clinic  4/11-precall complete-MS    INGUINAL HERNIA REPAIR      LAPAROSCOPIC BIOPSY OF PARA-AORTIC MASS N/A 8/7/2024    Procedure: XI Robotic BIOPSY, MASS, PARA-AORTIC, LAPAROSCOPIC, mesenteric lymph node biopsy with poss sm bowel biopsy;  Surgeon: Sanket Gallagher MD;  Location:  [facility] OR 2ND FLR;  Service: General;  Laterality: N/A;  also needs immunohistologic stain for IgG4 plasma cells, flow cytometry for hematologic malignancy and TB stains and cultures (per Dr. Gallagher)    LAPAROSCOPIC NISSEN FUNDOPLICATION  2/15/16    Mohs' procedure of face      RECTO VAGINAL FISTULA  2011, 2012    UMBILICAL HERNIA REPAIR           Pre-op Assessment    I have reviewed the Patient Summary Reports.     I have reviewed the Nursing Notes. I have reviewed the NPO Status.   I have reviewed the Medications.     Review of Systems  Anesthesia Hx:  No problems with previous Anesthesia             Denies Family Hx of Anesthesia complications.    Denies Personal Hx of Anesthesia complications.                    Hematology/Oncology:  Hematology Normal   Oncology Normal                                   EENT/Dental:  EENT/Dental Normal           Cardiovascular:  Cardiovascular Normal                                              Pulmonary:  Pulmonary Normal                       Renal/:  Renal/ Normal                 Hepatic/GI:  Bowel Prep. PUD, Hiatal Hernia,                 Musculoskeletal:  Musculoskeletal Normal                Neurological:    Neuromuscular Disease,                                   Endocrine:  Endocrine Normal            Dermatological:  Skin Normal    Psych:  Psychiatric Normal                    Physical Exam  General: Well nourished    Airway:  Mallampati: II   Mouth Opening: Normal  TM Distance: Normal  Tongue: Normal  Neck ROM: Normal ROM    Dental:  Intact        Anesthesia Plan  Type of Anesthesia, risks & benefits discussed:    Anesthesia Type: Gen Natural Airway  Intra-op Monitoring Plan: Standard ASA Monitors  Informed Consent: Informed consent signed with the Patient and all parties understand the risks and agree with anesthesia plan.  All questions answered.   ASA Score: 2  Day of Surgery Review of History & Physical: H&P Update referred to the surgeon/provider.I have  interviewed and examined the patient. I have reviewed the patient's H&P dated: There are no significant changes.     Ready For Surgery From Anesthesia Perspective.     .

## 2024-11-21 ENCOUNTER — LAB VISIT (OUTPATIENT)
Dept: LAB | Facility: HOSPITAL | Age: 64
End: 2024-11-21
Attending: INTERNAL MEDICINE
Payer: MEDICARE

## 2024-11-21 DIAGNOSIS — E78.49 OTHER HYPERLIPIDEMIA: ICD-10-CM

## 2024-11-21 LAB
ALBUMIN SERPL BCP-MCNC: 4 G/DL (ref 3.5–5.2)
ALP SERPL-CCNC: 109 U/L (ref 40–150)
ALT SERPL W/O P-5'-P-CCNC: 15 U/L (ref 10–44)
AST SERPL-CCNC: 17 U/L (ref 10–40)
BILIRUB DIRECT SERPL-MCNC: 0.2 MG/DL (ref 0.1–0.3)
BILIRUB SERPL-MCNC: 0.6 MG/DL (ref 0.1–1)
CHOLEST SERPL-MCNC: 217 MG/DL (ref 120–199)
CHOLEST/HDLC SERPL: 3.3 {RATIO} (ref 2–5)
HDLC SERPL-MCNC: 65 MG/DL (ref 40–75)
HDLC SERPL: 30 % (ref 20–50)
LDLC SERPL CALC-MCNC: 134.4 MG/DL (ref 63–159)
NONHDLC SERPL-MCNC: 152 MG/DL
PROT SERPL-MCNC: 8 G/DL (ref 6–8.4)
TRIGL SERPL-MCNC: 88 MG/DL (ref 30–150)

## 2024-11-21 PROCEDURE — 80076 HEPATIC FUNCTION PANEL: CPT | Performed by: INTERNAL MEDICINE

## 2024-11-21 PROCEDURE — 36415 COLL VENOUS BLD VENIPUNCTURE: CPT | Performed by: INTERNAL MEDICINE

## 2024-11-21 PROCEDURE — 80061 LIPID PANEL: CPT | Performed by: INTERNAL MEDICINE

## 2025-02-06 DIAGNOSIS — K21.00 GASTROESOPHAGEAL REFLUX DISEASE WITH ESOPHAGITIS WITHOUT HEMORRHAGE: ICD-10-CM

## 2025-02-06 RX ORDER — FAMOTIDINE 20 MG/1
40 TABLET, FILM COATED ORAL NIGHTLY
Qty: 180 TABLET | Refills: 3 | Status: SHIPPED | OUTPATIENT
Start: 2025-02-06 | End: 2026-02-01

## 2025-03-24 ENCOUNTER — OFFICE VISIT (OUTPATIENT)
Dept: OPTOMETRY | Facility: CLINIC | Age: 65
End: 2025-03-24
Payer: MEDICARE

## 2025-03-24 DIAGNOSIS — H52.203 HYPEROPIA OF BOTH EYES WITH ASTIGMATISM AND PRESBYOPIA: ICD-10-CM

## 2025-03-24 DIAGNOSIS — H04.123 DRY EYE SYNDROME OF BOTH EYES: ICD-10-CM

## 2025-03-24 DIAGNOSIS — H52.4 HYPEROPIA OF BOTH EYES WITH ASTIGMATISM AND PRESBYOPIA: ICD-10-CM

## 2025-03-24 DIAGNOSIS — H52.03 HYPEROPIA OF BOTH EYES WITH ASTIGMATISM AND PRESBYOPIA: ICD-10-CM

## 2025-03-24 DIAGNOSIS — H25.13 SENILE NUCLEAR CATARACT, BILATERAL: Primary | ICD-10-CM

## 2025-03-24 PROCEDURE — 1126F AMNT PAIN NOTED NONE PRSNT: CPT | Mod: CPTII,S$GLB,, | Performed by: OPTOMETRIST

## 2025-03-24 PROCEDURE — 92015 DETERMINE REFRACTIVE STATE: CPT | Mod: S$GLB,,, | Performed by: OPTOMETRIST

## 2025-03-24 PROCEDURE — 99999 PR PBB SHADOW E&M-EST. PATIENT-LVL II: CPT | Mod: PBBFAC,,, | Performed by: OPTOMETRIST

## 2025-03-24 PROCEDURE — 1159F MED LIST DOCD IN RCRD: CPT | Mod: CPTII,S$GLB,, | Performed by: OPTOMETRIST

## 2025-03-24 PROCEDURE — 1101F PT FALLS ASSESS-DOCD LE1/YR: CPT | Mod: CPTII,S$GLB,, | Performed by: OPTOMETRIST

## 2025-03-24 PROCEDURE — 3288F FALL RISK ASSESSMENT DOCD: CPT | Mod: CPTII,S$GLB,, | Performed by: OPTOMETRIST

## 2025-03-24 PROCEDURE — 92014 COMPRE OPH EXAM EST PT 1/>: CPT | Mod: S$GLB,,, | Performed by: OPTOMETRIST

## 2025-03-24 NOTE — PROGRESS NOTES
HPI    VANDANA: 12/2/2022  Chief complaint (CC): 64 yo F presents today for routine eye exam. Pt   reports noticeable vision changes at both ranges.  Glasses? +  Contacts? -  H/o eye surgery, injections or laser: -  H/o eye injury: -  Known eye conditions?    Nuclear sclerosis, bilateral   Hyperopia of both eyes with regular astigmatism and presbyopia  Family h/o eye conditions? -  Eye gtts? -      (-) Flashes (+) Floaters, occasional (-) Mucous   (-)  Tearing (-) Itching (-) Burning   (-) Headaches (-) Eye Pain/discomfort (-) Irritation   (-)  Redness (-) Double vision (-) Blurry vision    Diabetic? -  A1c? -     Last edited by Stephanie Brooks MA on 3/24/2025  9:41 AM.            Assessment /Plan     For exam results, see Encounter Report.    Senile nuclear cataract, bilateral    Hyperopia of both eyes with astigmatism and presbyopia    Dry eye syndrome of both eyes      MONITOR.ED PT ON ALL EXAM FINDINGS  RX FINAL SPECS   OCULAR HEALTH STABLE OD, OS  MILD NS OU; UV PROTECTION; PRESURGICAL; MONITOR.   RTC 1 YR//PRN FOR REE/DFE

## 2025-04-24 ENCOUNTER — OFFICE VISIT (OUTPATIENT)
Dept: RHEUMATOLOGY | Facility: CLINIC | Age: 65
End: 2025-04-24
Payer: MEDICARE

## 2025-04-24 VITALS
OXYGEN SATURATION: 95 % | WEIGHT: 214.94 LBS | HEIGHT: 66 IN | RESPIRATION RATE: 17 BRPM | BODY MASS INDEX: 34.55 KG/M2 | HEART RATE: 87 BPM | SYSTOLIC BLOOD PRESSURE: 101 MMHG | DIASTOLIC BLOOD PRESSURE: 64 MMHG

## 2025-04-24 DIAGNOSIS — M65.331 TRIGGER MIDDLE FINGER OF RIGHT HAND: Primary | ICD-10-CM

## 2025-04-24 PROCEDURE — 99999 PR PBB SHADOW E&M-EST. PATIENT-LVL III: CPT | Mod: PBBFAC,,, | Performed by: STUDENT IN AN ORGANIZED HEALTH CARE EDUCATION/TRAINING PROGRAM

## 2025-04-24 RX ORDER — LIDOCAINE HYDROCHLORIDE 10 MG/ML
0.5 INJECTION, SOLUTION EPIDURAL; INFILTRATION; INTRACAUDAL; PERINEURAL
Status: DISCONTINUED | OUTPATIENT
Start: 2025-04-24 | End: 2025-04-24 | Stop reason: HOSPADM

## 2025-04-24 RX ORDER — TRIAMCINOLONE ACETONIDE 40 MG/ML
40 INJECTION, SUSPENSION INTRA-ARTICULAR; INTRAMUSCULAR
Status: DISCONTINUED | OUTPATIENT
Start: 2025-04-24 | End: 2025-04-24 | Stop reason: HOSPADM

## 2025-04-24 RX ADMIN — TRIAMCINOLONE ACETONIDE 40 MG: 40 INJECTION, SUSPENSION INTRA-ARTICULAR; INTRAMUSCULAR at 04:04

## 2025-04-24 RX ADMIN — LIDOCAINE HYDROCHLORIDE 0.5 ML: 10 INJECTION, SOLUTION EPIDURAL; INFILTRATION; INTRACAUDAL; PERINEURAL at 04:04

## 2025-04-24 NOTE — PROCEDURES
Tendon Sheath    Date/Time: 4/24/2025 4:20 PM    Performed by: Helena Watkins MD  Authorized by: Helena Watkins MD    Consent Done?:  Yes (Written)  Indications:  Pain  Local anesthesia used?: Yes    Local anesthetic:  Co-phenylcaine spray  Location:  Long finger  Site:  R long flexor tendon sheath  Ultrasonic guidance for needle placement?: No    Needle size:  25 G  Medications:  0.5 mL LIDOcaine (PF) 10 mg/ml (1%) 10 mg/mL (1 %); 40 mg triamcinolone acetonide 40 mg/mL  Patient tolerance:  Patient tolerated the procedure well with no immediate complications

## 2025-04-24 NOTE — TELEPHONE ENCOUNTER
No care due was identified.  [facility] Embedded Care Due Messages. Reference number: [id number].   4/24/2025 4:26:23 PM CDT

## 2025-04-24 NOTE — PROGRESS NOTES
"     RHEUMATOLOGY OUTPATIENT CLINIC NOTE    4/24/2025    Attending Rheumatologist: Helena Watkins  Primary Care Provider: Geena Roman MD   Physician Requesting Consultation: No referring provider defined for this encounter.  Chief Complaint/Reason For Consultation:  Follow-up      Subjective:       HPI  Rosanna Live is a 65 y.o. 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     Today 4/2025  -patient comes today because worsening pain in right middle trigger finger. She would like to have injection.   -left trigger finger injection done last visit worked great.     Disease history    She has history of gill's esophagus in the past. She is on daily Pepcid as she is intolerant to PPI.   In 7/2022 had findings of ischemic colitis in colonoscopy. In late 2022, she started having RUQ and epigastric pain. CT enterography at the time showed Mesenteric stranding and prominent mesenteric lymph nodes, similar to prior exam. Findings could suggest mesenteric adenitis. Colonoscopy on 4/2024 showed findings not definitive for inflammatory bowel disease but not consistent with ischemic colitis. She was started on mesalamine tablets and plan is to follow up colonoscopy in 6 months.   She has repeated CT enterography on 5/30 that showed findings of " Similar appearance of the mesentery with mesenteric stranding/ana mesentery sign, unchanged from 12/14/2016. Findings are nonspecific, but may suggest mesenteric panniculitis. Prominent mesenteric lymph nodes, unchanged from 2016. No retroperitoneal adenopathy. "   She was referred to general surgery for lymph node biopsy which is scheduled for 8/7/24.   She was referred to rheumatology for further evaluation of mesenteric panniculitis.     Reports Left 3rd trigger finger for the past month, it can be very painful at times. She feels like the right 3rd finger is starting to trigger.   Reports numbness in hands and feet in the " morning, improves after moving the hand   Reports morning stiffness for about 1 hour  Reports pain in bilateral knees, right worse than left, worse when climbing stairs, no swelling.   Reports swelling in bilateral ankles and erythema after prolonged walking.     +dysphagia - hx of esophageal dilation many years ago.     No unintentional weight loss. Doing WW.     No malar rash,photosensitivity   No telangiectasias   No calcinosis   No patchy alopecia   No oral and nasal ulcers   No dry eyes   Has dry mouth + intermittent.   No pleurisy or any cardiopulmonary complaints   No dysphagia,diplopia and dysphonia and muscle weakness   No raynaud's   No digital ulcers   No cytopenias   No renal issues   No blood clots - prior hx of ischemic colitis  No fever,chills,night sweats,weight loss and loss of appetite   No new onset headaches   No recurrent conjunctivitis or uveitis or scleritis or episcleritis   No swollen glands  No seizures,strokes,psychosis  No sclerodactyly  No puffy hands  No perioral tightness      She is retired. Used to work as a .   Quit smoking 40 years ago.     Mother with MG, father with RA, daughter with amyloidosis, nephew with Crohn's     ------    8/7 mesenteric LN biopsy  1. MESENTERY, EXCISION:   Benign mature adipose tissue with features of fat necrosis   Negative for neoplasia or malignancy   No lymph nodes identified     2. MESENTERY, EXCISION:   Benign mature adipose tissue with features of fat necrosis   Negative for neoplasia or malignancy   No lymph nodes identified     3. SMALL BOWEL, DISTAL, PARTIAL RESECTION:   Portion of benign small bowel with focal pancreatic heterotopia   Negative for neoplasia or malignancy     4. 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    HENT:  Negative for mouth sores and trouble swallowing.    Eyes:  Negative for redness.   Respiratory:  Negative for cough and shortness of breath.    Cardiovascular:  Negative for chest pain.   Gastrointestinal:  Positive for diarrhea. Negative for constipation.   Genitourinary:  Negative for dysuria and genital sores.   Integumentary:  Positive for rash.   Neurological:  Negative for headaches.   Hematological:  Bruises/bleeds easily.        Chronic comorbid conditions affecting medical decision making today:  Past Medical History:   Diagnosis Date    Allergy     Brito's esophagus     Basal cell cancer     Cataract     Diverticulosis     Edema     chronic right foot     Hiatal hernia 1/31/2017    IBS (irritable bowel syndrome)     Obesity     Rectovaginal fistula 6/28/2012    Vitamin D deficiency disease      Past Surgical History:   Procedure Laterality Date    CHOLECYSTECTOMY      COLONOSCOPY      COLONOSCOPY N/A 7/11/2022    Procedure: COLONOSCOPY;  Surgeon: Carlos Manuel Barber MD;  Location: [address]);  Service: Endoscopy;  Laterality: N/A;  golytely    COLONOSCOPY N/A 4/16/2024    Procedure: COLONOSCOPY;  Surgeon: Carlos Manuel Barber MD;  Location: [address]);  Service: Endoscopy;  Laterality: N/A;    COLONOSCOPY N/A 11/5/2024    Procedure: COLONOSCOPY;  Surgeon: Carlos Manuel Barber MD;  Location: [facility] ([address] [place] [address]);  Service: Endoscopy;  Laterality: N/A;  soraya pt / prep inst sent to pt via portal / suprep/  pt rescheduled procedure  Instructions sent through [place] 10/29-  10/30-pre call complete-tb    ESOPHAGOGASTRODUODENOSCOPY      ESOPHAGOGASTRODUODENOSCOPY N/A 9/18/2019    Procedure: EGD (ESOPHAGOGASTRODUODENOSCOPY);  Surgeon: Carlos Manuel Barber MD;  Location: [address]);  Service: Endoscopy;  Laterality: N/A;  evaluate fundoplication    ESOPHAGOGASTRODUODENOSCOPY N/A 7/11/2022    Procedure: EGD (ESOPHAGOGASTRODUODENOSCOPY);  Surgeon: Carlos Manuel Barber MD;  Location:  [facility] ENDO (4TH FLR);  Service: Endoscopy;  Laterality: N/A;  [place] [facility] [place] to [email or web address]    ESOPHAGOGASTRODUODENOSCOPY N/A 4/16/2024    Procedure: EGD (ESOPHAGOGASTRODUODENOSCOPY);  Surgeon: Carlos Manuel Barber MD;  Location: [facility] (4TH FLR);  Service: Endoscopy;  Laterality: N/A;  Suprep  Referral Dr. Barber  prep instructions given to pt in clinic  4/11-precall complete-MS    INGUINAL HERNIA REPAIR      LAPAROSCOPIC BIOPSY OF PARA-AORTIC MASS N/A 8/7/2024    Procedure: XI Robotic BIOPSY, MASS, PARA-AORTIC, LAPAROSCOPIC, mesenteric lymph node biopsy with poss sm bowel biopsy;  Surgeon: Sanket Gallagher MD;  Location: [facility] OR 2ND [facility];  Service: General;  Laterality: N/A;  also needs immunohistologic stain for IgG4 plasma cells, flow cytometry for hematologic malignancy and TB stains and cultures (per Dr. Gallagher)    LAPAROSCOPIC NISSEN FUNDOPLICATION  2/15/16    Mohs' procedure of face      RECTO VAGINAL FISTULA  2011, 2012    UMBILICAL HERNIA REPAIR       Family History   Problem Relation Name Age of Onset    Miscarriages / Stillbirths Mother      Diabetes Mother      Hypertension Mother      Glaucoma Mother      Cataracts Mother      Arthritis Father          Rhematoid    Hyperlipidemia Father      Cataracts Father      Glaucoma Father      Heart disease Father      No Known Problems Sister      No Known Problems Brother      Heart disease Maternal Grandmother      No Known Problems Maternal Grandfather      No Known Problems Paternal Grandmother      No Known Problems Paternal Grandfather      Heart disease Maternal Aunt      No Known Problems Maternal Uncle      No Known Problems Paternal Aunt      No Known Problems Paternal Uncle      Cancer Daughter daughter         amyloidosis    No Known Problems Son      Breast cancer Neg Hx      Colon cancer Neg Hx      Ovarian cancer Neg Hx       Social History     Substance and Sexual Activity   Alcohol Use Yes    Alcohol/week:  1.0 standard drink of alcohol    Types: 1 Glasses of wine per week    Comment: Social     Social History     Tobacco Use   Smoking Status Former    Current packs/day: 0.00    Average packs/day: 0.3 packs/day for 5.0 years (1.3 ttl pk-yrs)    Types: Cigarettes    Start date: 1978    Quit date: 1983    Years since quittin.6   Smokeless Tobacco Never     Social History     Substance and Sexual Activity   Drug Use No       Current Outpatient Medications:     famotidine (PEPCID) 20 MG tablet, Take 2 tablets (40 mg total) by mouth every evening., Disp: 180 tablet, Rfl: 3    metoprolol tartrate (LOPRESSOR) 25 MG tablet, TAKE 1 TABLET BY MOUTH TWICE A DAY, Disp: 180 tablet, Rfl: 3    EScitalopram oxalate (LEXAPRO) 10 MG tablet, Take 1 tablet (10 mg total) by mouth once daily., Disp: 30 tablet, Rfl: 11     Objective:         Vitals:    25 1502   BP: 101/64   Pulse: 87   Resp: 17     Physical Exam   Constitutional: She is oriented to person, place, and time. She appears well-developed.   HENT:   Head: Normocephalic.   Mouth/Throat: Mucous membranes are moist.   Cardiovascular: Normal rate and normal heart sounds.   Pulmonary/Chest: Effort normal and breath sounds normal.   Abdominal: Soft. She exhibits no distension and no mass. There is no abdominal tenderness.   Musculoskeletal:      Cervical back: Neck supple.      Comments: Right 3rd flexor retinaculum with nodule and tenderness  No synovitis noted  Bilateral knees with crepitus, no instability, no joint line tenderness  Bilateral ankles with soft tissue swelling, no synovitis.  Negative MTP squeeze    Lymphadenopathy:     She has no cervical adenopathy.   Neurological: She is alert and oriented to person, place, and time.   Skin: No rash noted.   No Heliotrope rash  No Gottron papules  No sclerodactyly   No Raynaud's  No Petechiae  No discoid lesions  No alopecia  Normal Capillaroscopy   Vitals reviewed.      Reviewed old and all outside pertinent  "medical records available.    All lab results personally reviewed and interpreted by me.  Lab Results   Component Value Date    WBC 7.84 06/26/2024    HGB 12.4 06/26/2024    HGB 12.4 06/26/2024    HCT 37.6 06/26/2024    MCV 88 06/26/2024    MCH 28.9 06/26/2024    MCHC 33.0 06/26/2024    RDW 12.6 06/26/2024     06/26/2024    MPV 8.6 (L) 06/26/2024       Lab Results   Component Value Date     05/30/2024    K 3.9 05/30/2024     05/30/2024    CO2 22 (L) 05/30/2024    GLU 92 05/30/2024    BUN 12 05/30/2024    CALCIUM 9.6 05/30/2024    PROT 8.0 11/21/2024    ALBUMIN 4.0 11/21/2024    BILITOT 0.6 11/21/2024    AST 17 11/21/2024    ALKPHOS 109 11/21/2024    ALT 15 11/21/2024       Lab Results   Component Value Date    COLORU Yellow 06/26/2024    APPEARANCEUA Clear 06/26/2024    SPECGRAV 1.020 06/26/2024    PHUR 6.0 06/26/2024    PROTEINUA Negative 06/26/2024    KETONESU Negative 06/26/2024    LEUKOCYTESUR 2+ (A) 06/26/2024    NITRITE Negative 06/26/2024    UROBILINOGEN Negative 06/26/2024       Lab Results   Component Value Date    CRP 5.5 06/26/2024       Lab Results   Component Value Date    RF <13.0 05/30/2024    SEDRATE 23 (H) 06/26/2024    CCPANTIBODIE <0.5 06/26/2024       No components found for: "25OHVITDTOT", "97YWCVHE2", "84BKEMDP0", "METHODNOTE"    No results found for: "URICACID"    Lab Results   Component Value Date    HEPBSAG Non-reactive 06/26/2024    HEPBSAG Negative 02/14/2017    HEPCAB Non-reactive 06/26/2024    HEPCAB Negative 02/14/2017       Imaging:  All imaging reviewed and independently interpreted by me.     ASSESSMENT / PLAN:     Rosanna Rolando Crovetto is a 65 y.o. White female with:    1. Mesenteric panniculitis    Mesenteric stranding and prominent mesenteric lymph nodes, stable since 2016.    Extensive review of system is essentially negative with exception of joint pain.    Lesions have been stable for years    She is asymptomatic - no unintentional weight loss, abdominal " pain, signs of obstruction.    Recent mesenteric biopsy neg for malignancy. No lymph nodes identified. No IgG4 stains. Reassurance   Extensive serologic markers negative   2. Hx of ischemic colitis  3. Ulcerative colitis on mesalamine   HLAB27 positive   4. Arthralgia   Hand and feet pain with no swelling but prolonged morning stiffness    Bilateral knee pain, + crepitus - likely OA  5. Right trigger finger.     Plan  -Mesenteric biopsy negative for malignancy. No LN identified.  Serologic markers negative except for positive HLAB27 that goes along with hx of UC.   -Monitor longitudinally due to arthralgias in hands and feet.   -proceed with right 3rd finger injection now. See procedure note. Post injection care discussed with patient.     Follow up if symptoms worsen or fail to improve.     Method of contact with patient concerns: Sergei attn Rheumatology    Disclaimer:  This note is prepared using voice recognition software and as such is likely to have errors and has not been proof read. Please contact me for questions.     Time spent: 10 minutes in face to face discussion concerning diagnosis, prognosis, review of lab and test results, benefits of treatment as well as management of disease, counseling of patient and coordination of care between various health care providers.      Helena Watkins M.D.  Rheumatology  [facility]

## 2025-04-25 RX ORDER — ESCITALOPRAM OXALATE 10 MG/1
10 TABLET ORAL DAILY
Qty: 30 TABLET | Refills: 11 | Status: SHIPPED | OUTPATIENT
Start: 2025-04-25 | End: 2026-04-25

## 2025-05-05 ENCOUNTER — OFFICE VISIT (OUTPATIENT)
Dept: CARDIOLOGY | Facility: CLINIC | Age: 65
End: 2025-05-05
Payer: MEDICARE

## 2025-05-05 VITALS
RESPIRATION RATE: 16 BRPM | HEIGHT: 66 IN | OXYGEN SATURATION: 96 % | DIASTOLIC BLOOD PRESSURE: 68 MMHG | WEIGHT: 215.63 LBS | HEART RATE: 68 BPM | BODY MASS INDEX: 34.65 KG/M2 | SYSTOLIC BLOOD PRESSURE: 106 MMHG

## 2025-05-05 DIAGNOSIS — I70.0 AORTIC ATHEROSCLEROSIS: ICD-10-CM

## 2025-05-05 DIAGNOSIS — E66.811 CLASS 1 OBESITY DUE TO EXCESS CALORIES WITHOUT SERIOUS COMORBIDITY WITH BODY MASS INDEX (BMI) OF 33.0 TO 33.9 IN ADULT: ICD-10-CM

## 2025-05-05 DIAGNOSIS — I87.2 VENOUS INSUFFICIENCY: Primary | ICD-10-CM

## 2025-05-05 DIAGNOSIS — E66.09 CLASS 1 OBESITY DUE TO EXCESS CALORIES WITHOUT SERIOUS COMORBIDITY WITH BODY MASS INDEX (BMI) OF 33.0 TO 33.9 IN ADULT: ICD-10-CM

## 2025-05-05 DIAGNOSIS — E78.5 HYPERLIPIDEMIA, UNSPECIFIED HYPERLIPIDEMIA TYPE: ICD-10-CM

## 2025-05-05 DIAGNOSIS — K51.00 CHRONIC ULCERATIVE ENTEROCOLITIS WITHOUT COMPLICATION: ICD-10-CM

## 2025-05-05 DIAGNOSIS — R00.2 PALPITATIONS: ICD-10-CM

## 2025-05-05 PROCEDURE — 99999 PR PBB SHADOW E&M-EST. PATIENT-LVL III: CPT | Mod: PBBFAC,,, | Performed by: INTERNAL MEDICINE

## 2025-05-05 RX ORDER — DICYCLOMINE HYDROCHLORIDE 20 MG/1
20 TABLET ORAL EVERY 6 HOURS
COMMUNITY

## 2025-05-05 NOTE — PROGRESS NOTES
[facility] Cardiology     Subjective:    Patient ID:  Rosanna Live is a 65 y.o. female who presents for follow-up of Palpitations, Chronic Venous Insufficiency, and Hyperlipidemia    Review of patient's allergies indicates:   Allergen Reactions    Penicillins Rash     She  takes Lopressor 25 mg b.I.d. for palpitations.  Her Holter showed PVCs as well as short runs of atrial tachycardia.  She has had a very favorable response to beta-blockers.  She has no history of hypertension.  She experiences rare palpitations, she believes it is triggered by alcohol ingestion, wine.      She has chronic venous insufficiency.  She has reflux.  She has some superficial varicosities.  She states that 1 of her superficial varicosities bother her right thigh area with throbbing like discomfort.  She wore compression stockings in the past and had equivocal response.  She is going to see a vein specialist in [place]  in the future.      Her LDL is 144 mg%.  She does not have a history of atherosclerotic heart disease.      She was diagnosed with ulcerative colitis and is now on medication which has helped her.         Review of Systems   Constitutional: Negative for chills, decreased appetite, diaphoresis, fever, malaise/fatigue, night sweats, weight gain and weight loss.   HENT:  Negative for congestion, ear discharge, ear pain, hearing loss, hoarse voice, nosebleeds, odynophagia, sore throat, stridor and tinnitus.    Eyes:  Negative for blurred vision, discharge, double vision, pain, photophobia, redness, vision loss in left eye, vision loss in right eye, visual disturbance and visual halos.   Cardiovascular:  Positive for leg swelling and palpitations. Negative for chest pain, claudication, cyanosis, dyspnea on exertion, irregular heartbeat, near-syncope, orthopnea, paroxysmal nocturnal dyspnea and syncope.   Respiratory:  Negative for cough, hemoptysis,  "shortness of breath, sleep disturbances due to breathing, snoring, sputum production and wheezing.    Endocrine: Negative for cold intolerance, heat intolerance, polydipsia, polyphagia and polyuria.   Hematologic/Lymphatic: Negative for adenopathy and bleeding problem. Does not bruise/bleed easily.   Skin:  Negative for color change, dry skin, flushing, itching, nail changes, poor wound healing, rash, skin cancer, suspicious lesions and unusual hair distribution.   Musculoskeletal:  Negative for arthritis, back pain, falls, gout, joint pain, joint swelling, muscle cramps, muscle weakness, myalgias, neck pain and stiffness.   Gastrointestinal:  Negative for bloating, abdominal pain, anorexia, change in bowel habit, bowel incontinence, constipation, diarrhea, dysphagia, excessive appetite, flatus, heartburn, hematemesis, hematochezia, hemorrhoids, jaundice, melena, nausea and vomiting.   Genitourinary:  Negative for bladder incontinence, decreased libido, dysuria, flank pain, frequency, genital sores, hematuria, hesitancy, incomplete emptying, nocturia and urgency.   Neurological:  Negative for aphonia, brief paralysis, difficulty with concentration, disturbances in coordination, excessive daytime sleepiness, dizziness, focal weakness, headaches, light-headedness, loss of balance, numbness, paresthesias, seizures, sensory change, tremors, vertigo and weakness.   Psychiatric/Behavioral:  Negative for altered mental status, depression, hallucinations, memory loss, substance abuse, suicidal ideas and thoughts of violence. The patient does not have insomnia and is not nervous/anxious.    Allergic/Immunologic: Negative for hives and persistent infections.        Objective:       Vitals:    05/05/25 0853   BP: 106/68   BP Location: Left arm   Patient Position: Sitting   Pulse: 68   Resp: 16   SpO2: 96%   Weight: 97.8 kg (215 lb 9.8 oz)   Height: 5' 6" (1.676 m)    Physical Exam  Constitutional:       General: She is not in " acute distress.     Appearance: She is well-developed. She is not diaphoretic.   HENT:      Head: Normocephalic and atraumatic.      Nose: Nose normal.   Eyes:      General: No scleral icterus.        Right eye: No discharge.      Conjunctiva/sclera: Conjunctivae normal.      Pupils: Pupils are equal, round, and reactive to light.   Neck:      Thyroid: No thyromegaly.      Vascular: No JVD.      Trachea: No tracheal deviation.   Cardiovascular:      Rate and Rhythm: Normal rate and regular rhythm.      Pulses:           Carotid pulses are 2+ on the right side and 2+ on the left side.       Radial pulses are 2+ on the right side and 2+ on the left side.        Dorsalis pedis pulses are 2+ on the right side and 2+ on the left side.        Posterior tibial pulses are 2+ on the right side and 2+ on the left side.      Heart sounds: Normal heart sounds. No murmur heard.     No friction rub. No gallop.   Pulmonary:      Effort: Pulmonary effort is normal. No respiratory distress.      Breath sounds: Normal breath sounds. No stridor. No wheezing or rales.   Chest:      Chest wall: No tenderness.   Abdominal:      General: Bowel sounds are normal. There is no distension.      Palpations: Abdomen is soft. There is no mass.      Tenderness: There is no abdominal tenderness. There is no guarding or rebound.   Musculoskeletal:         General: No tenderness. Normal range of motion.      Cervical back: Normal range of motion and neck supple.      Comments:   Bilateral superficial varicosities of the legs noted.   Lymphadenopathy:      Cervical: No cervical adenopathy.   Skin:     General: Skin is warm and dry.      Coloration: Skin is not pale.      Findings: No erythema or rash.   Neurological:      Mental Status: She is alert and oriented to person, place, and time.      Cranial Nerves: No cranial nerve deficit.      Coordination: Coordination normal.   Psychiatric:         Behavior: Behavior normal.         Thought Content:  Thought content normal.         Judgment: Judgment normal.           Assessment:       1. Venous insufficiency    2. Palpitations    3. Hyperlipidemia, unspecified hyperlipidemia type    4. Aortic atherosclerosis    5. Class 1 obesity due to excess calories without serious comorbidity with body mass index (BMI) of 33.0 to 33.9 in adult    6. Chronic ulcerative enterocolitis without complication      Results for orders placed or performed in visit on 11/21/24   Lipid Panel    Collection Time: 11/21/24  8:08 AM   Result Value Ref Range    Cholesterol 217 (H) 120 - 199 mg/dL    Triglycerides 88 30 - 150 mg/dL    HDL 65 40 - 75 mg/dL    LDL Cholesterol 134.4 63.0 - 159.0 mg/dL    HDL/Cholesterol Ratio 30.0 20.0 - 50.0 %    Total Cholesterol/HDL Ratio 3.3 2.0 - 5.0    Non-HDL Cholesterol 152 mg/dL   Hepatic Function Panel    Collection Time: 11/21/24  8:08 AM   Result Value Ref Range    Total Protein 8.0 6.0 - 8.4 g/dL    Albumin 4.0 3.5 - 5.2 g/dL    Total Bilirubin 0.6 0.1 - 1.0 mg/dL    Bilirubin, Direct 0.2 0.1 - 0.3 mg/dL    AST 17 10 - 40 U/L    ALT 15 10 - 44 U/L    Alkaline Phosphatase 109 40 - 150 U/L       Current Medications[1]     Lab Results   Component Value Date    WBC 7.84 06/26/2024    RBC 4.29 06/26/2024    HGB 12.4 06/26/2024    HGB 12.4 06/26/2024    HCT 37.6 06/26/2024    MCV 88 06/26/2024    MCH 28.9 06/26/2024    MCHC 33.0 06/26/2024    RDW 12.6 06/26/2024     06/26/2024    MPV 8.6 (L) 06/26/2024    GRAN 4.8 06/26/2024    GRAN 61.6 06/26/2024    LYMPH 1.8 06/26/2024    LYMPH 23.5 06/26/2024    MONO 0.9 06/26/2024    MONO 11.0 06/26/2024    EOS 0.3 06/26/2024    BASO 0.05 06/26/2024    EOSINOPHIL 3.3 06/26/2024    BASOPHIL 0.6 06/26/2024    MG 2.1 12/09/2019        CMP  Lab Results   Component Value Date     05/30/2024    K 3.9 05/30/2024     05/30/2024    CO2 22 (L) 05/30/2024    GLU 92 05/30/2024    BUN 12 05/30/2024    CREATININE 0.7 05/30/2024    CALCIUM 9.6 05/30/2024    PROT  8.0 11/21/2024    ALBUMIN 4.0 11/21/2024    BILITOT 0.6 11/21/2024    ALKPHOS 109 11/21/2024    AST 17 11/21/2024    ALT 15 11/21/2024    ANIONGAP 9 05/30/2024    ESTGFRAFRICA >60.0 04/07/2022    EGFRNONAA >60.0 04/07/2022        Lab Results   Component Value Date    LABURIN  12/11/2014     STREPTOCOCCUS AGALACTIAE (GROUP B)  > 100,000 cfu/ml  Susceptibility testing not routinely performed              Results for orders placed or performed during the hospital encounter of 07/16/24   SCHEDULED EKG 12-LEAD (to Muse)    Collection Time: 07/16/24  9:19 AM   Result Value Ref Range    QRS Duration 92 ms    [facility] QTC Calculation 396 ms    Narrative    Test Reason : Z01.818,    Vent. Rate : 059 BPM     Atrial Rate : 059 BPM     P-R Int : 158 ms          QRS Dur : 092 ms      QT Int : 400 ms       P-R-T Axes : 051 079 055 degrees     QTc Int : 396 ms    Sinus bradycardia  Otherwise normal ECG  When compared with ECG of 10-APR-2023 11:21,  No significant change was found  Confirmed by Roderick Eugene MD ([id number] on 7/16/2024 12:46:28 PM    Referred By: ERNESTINA DYKES           Confirmed By:Roderick Eugene MD                   Plan:       Problem List Items Addressed This Visit          Cardiac/Vascular    Venous insufficiency - Primary      Moderate severity.  She does not want to wear compression stockings.  She has minimal swelling problems, mostly superficial varicosities as well as deep venous insufficiency reported on previous workup.         Palpitations      Condition stable on beta-blocker therapy.         Aortic atherosclerosis      Condition stable.         Hyperlipidemia      Condition unchanged.  No treatment recommended.  She is considered primary prevention.            Endocrine    Class 1 obesity due to excess calories without serious comorbidity with body mass index (BMI) of 33.0 to 33.9 in adult      Weight loss encouraged.            GI    Chronic ulcerative enterocolitis without complication     She states  that she was started on a new medication.  It is helping.  Condition stable.            1 year follow-up recommended.      She will continue Lopressor.      We discussed her lower extremity venous disease.  Her main complaint is related to a superficial varicosity right popliteal position.      No changes in treatment recommended.                       Lane Simpson MD  05/05/2025   9:37 AM               [1]   Current Outpatient Medications:     dicyclomine (BENTYL) 20 mg tablet, Take 20 mg by mouth every 6 (six) hours., Disp: , Rfl:     EScitalopram oxalate (LEXAPRO) 10 MG tablet, Take 1 tablet (10 mg total) by mouth once daily., Disp: 30 tablet, Rfl: 11    famotidine (PEPCID) 20 MG tablet, Take 2 tablets (40 mg total) by mouth every evening., Disp: 180 tablet, Rfl: 3    metoprolol tartrate (LOPRESSOR) 25 MG tablet, TAKE 1 TABLET BY MOUTH TWICE A DAY, Disp: 180 tablet, Rfl: 3

## 2025-05-30 ENCOUNTER — OFFICE VISIT (OUTPATIENT)
Dept: URGENT CARE | Facility: CLINIC | Age: 65
End: 2025-05-30
Payer: MEDICARE

## 2025-05-30 VITALS
RESPIRATION RATE: 16 BRPM | WEIGHT: 215.63 LBS | DIASTOLIC BLOOD PRESSURE: 73 MMHG | BODY MASS INDEX: 34.65 KG/M2 | HEART RATE: 73 BPM | TEMPERATURE: 98 F | HEIGHT: 66 IN | OXYGEN SATURATION: 95 % | SYSTOLIC BLOOD PRESSURE: 116 MMHG

## 2025-05-30 DIAGNOSIS — J02.9 SORE THROAT: ICD-10-CM

## 2025-05-30 DIAGNOSIS — J02.0 STREP PHARYNGITIS: Primary | ICD-10-CM

## 2025-05-30 LAB
CTP QC/QA: YES
MOLECULAR STREP A: POSITIVE

## 2025-05-30 RX ORDER — AZITHROMYCIN 250 MG/1
TABLET, FILM COATED ORAL
Qty: 6 TABLET | Refills: 0 | Status: SHIPPED | OUTPATIENT
Start: 2025-05-30 | End: 2025-06-04

## 2025-05-30 NOTE — PATIENT INSTRUCTIONS
Be sure to take all of the prescribed antibiotics until completed. Change your toothbrush after being on the antibiotics for 24 hours.     Thank you for choosing [facility]!     Our goal in the Urgent Care is to always provide outstanding medical care. You may receive a survey by mail or e-mail in the next week regarding your experience today. We would greatly appreciate you completing and returning the survey. Your feedback provides us with a way to recognize our staff who provide very good care, and it helps us learn how to improve when your experience was below our aspiration of excellence.       We appreciate you trusting us with your medical care. We hope you feel better soon. We will be happy to take care of you for all of your future medical needs.    You must understand that you've received an Urgent Care treatment only and that you may be released before all your medical problems are known or treated. You, the patient, will arrange for follow up care as instructed.      Follow up with your PCP or specialty clinic as instructed in the next 2-3 days if not improved or as needed. You can call [phone] to schedule an appointment with appropriate provider.      If you condition worsens, we recommend that you receive another evaluation at the emergency room immediately or contact your primary medical clinic's after hours call service to discuss your concerns.      Please return here or go to the Emergency Department for any concerns or worsening condition.             - rate controlled  - c/w digoxin every other day   - not on ac due to past hemorrhagic stroke

## 2025-05-30 NOTE — PROGRESS NOTES
"Subjective:      Patient ID: Rosanna Live is a 65 y.o. female.    Vitals:  height is 5' 6" (1.676 m) and weight is 97.8 kg (215 lb 9.8 oz). Her oral temperature is 97.6 °F (36.4 °C). Her blood pressure is 116/73 and her pulse is 73. Her respiration is 16 and oxygen saturation is 95%.     Chief Complaint: Sore Throat    Pt presents with sore throat. Symptoms started two days ago.    Patient provider note starts here:  Patient presents with complaints of having a sore throat x2 days now but worsened this morning. Reports that she was camping this week with a large group of people, one adult which had strep. She is concerned that she has strep as well. Denies fevers or nasal congestion.     Sore Throat   This is a new problem. The current episode started in the past 7 days. The problem has been unchanged. There has been no fever. The pain is at a severity of 9/10. Pertinent negatives include no abdominal pain, diarrhea, neck pain or vomiting. Treatments tried: claritin.       Constitution: Negative for fever.   HENT:  Positive for sore throat.    Neck: Negative for neck pain.   Cardiovascular:  Negative for chest pain, palpitations and sob on exertion.   Respiratory:  Negative for chest tightness and wheezing.    Gastrointestinal:  Negative for abdominal pain, vomiting and diarrhea.   Skin:  Negative for color change and wound.   Neurological:  Negative for numbness and tingling.      Objective:     Physical Exam   Constitutional: She is oriented to person, place, and time. She appears well-developed. She is cooperative.  Non-toxic appearance. She does not appear ill. No distress.   HENT:   Head: Normocephalic and atraumatic.   Ears:   Right Ear: Hearing, tympanic membrane, external ear and ear canal normal.   Left Ear: Hearing, tympanic membrane, external ear and ear canal normal.   Nose: Nose normal. No mucosal edema, rhinorrhea, nasal deformity or congestion. No epistaxis. Right sinus exhibits no " maxillary sinus tenderness and no frontal sinus tenderness. Left sinus exhibits no maxillary sinus tenderness and no frontal sinus tenderness.   Mouth/Throat: Uvula is midline and mucous membranes are normal. No trismus in the jaw. Normal dentition. No uvula swelling. Oropharyngeal exudate and posterior oropharyngeal erythema present. No posterior oropharyngeal edema.      Comments: Posterior oropharyngeal erythema is noted with exudate on the right side. Uvula is midline, tolerating secretions well.     Eyes: Conjunctivae and lids are normal. No scleral icterus.   Neck: Trachea normal and phonation normal. Neck supple. No edema present. No erythema present. No neck rigidity present.   Cardiovascular: Normal rate, regular rhythm, normal heart sounds and normal pulses.   Pulmonary/Chest: Effort normal and breath sounds normal. No respiratory distress. She has no decreased breath sounds. She has no wheezes. She has no rhonchi.   Abdominal: Normal appearance.   Musculoskeletal: Normal range of motion.         General: No deformity. Normal range of motion.   Lymphadenopathy:     She has cervical adenopathy.   Neurological: She is alert and oriented to person, place, and time. She exhibits normal muscle tone. Coordination normal.   Skin: Skin is warm, dry, intact, not diaphoretic and not pale.   Psychiatric: Her speech is normal and behavior is normal. Judgment and thought content normal.   Nursing note and vitals reviewed.      Assessment:     1. Strep pharyngitis    2. Sore throat      Results for orders placed or performed in visit on 05/30/25   POCT Strep A, Molecular    Collection Time: 05/30/25 10:30 AM   Result Value Ref Range    Molecular Strep A, POC Positive (A) Negative     Acceptable Yes        Plan:       Strep pharyngitis  -     azithromycin (Z-ANABELL) 250 MG tablet; Take 2 tablets by mouth on day 1; Take 1 tablet by mouth on days 2-5  Dispense: 6 tablet; Refill: 0    Sore throat  -     POCT  Strep A, Molecular          Medical Decision Making:   History:   Old Medical Records: I decided to obtain old medical records.  Old Records Summarized: records from clinic visits.  Clinical Tests:   Lab Tests: Ordered and Reviewed  Urgent Care Management:  A. Problem List:   -Acute: Strep pharyngitis    -Chronic: IBS, Brito's esophagus, vitamin D deficiency   B. Differential diagnosis: Viral pharyngitis, strep pharyngitis, allergic rhinitis, post nasal drip, retropharyngeal abscess, peritonsillar abscess  C. Diagnostic Testing Ordered: Strep   D. Diagnostic Testing Considered: None  E. Independent Historians: None  F. Urgent Care Midlevel Independent Results Interpretation: Strep positive   G. Radiology:  H. Review of Previous Medical Records:  I. Home Medications Reviewed  J. Social Determinants of Health considered  K. Medical Decision Making and Disposition:  Patient presents to the clinic with complaints of a sore throat with known exposure to strep pharyngitis.  On exam, she is afebrile and nontoxic in appearance.  Posterior oropharyngeal erythema is noted with exudate on the right side.  She is tolerating secretions well.  Strep test is positive.  Advised to obtain a new toothbrush after being on antibiotics for 24 hours and ED precautions discussed.  She verbalized understanding and agreed with this plan.           Patient Instructions   Be sure to take all of the prescribed antibiotics until completed. Change your toothbrush after being on the antibiotics for 24 hours.     Thank you for choosing [facility]!     Our goal in the Urgent Care is to always provide outstanding medical care. You may receive a survey by mail or e-mail in the next week regarding your experience today. We would greatly appreciate you completing and returning the survey. Your feedback provides us with a way to recognize our staff who provide very good care, and it helps us learn how to improve when your experience was below  our aspiration of excellence.       We appreciate you trusting us with your medical care. We hope you feel better soon. We will be happy to take care of you for all of your future medical needs.    You must understand that you've received an Urgent Care treatment only and that you may be released before all your medical problems are known or treated. You, the patient, will arrange for follow up care as instructed.      Follow up with your PCP or specialty clinic as instructed in the next 2-3 days if not improved or as needed. You can call [phone] to schedule an appointment with appropriate provider.      If you condition worsens, we recommend that you receive another evaluation at the emergency room immediately or contact your primary medical clinic's after hours call service to discuss your concerns.      Please return here or go to the Emergency Department for any concerns or worsening condition.

## 2025-07-07 ENCOUNTER — PATIENT MESSAGE (OUTPATIENT)
Dept: GASTROENTEROLOGY | Facility: CLINIC | Age: 65
End: 2025-07-07
Payer: MEDICARE

## 2025-07-07 DIAGNOSIS — Z79.899 ENCOUNTER FOR LONG-TERM CURRENT USE OF HIGH RISK MEDICATION: ICD-10-CM

## 2025-07-07 DIAGNOSIS — K52.9 COLITIS: Primary | ICD-10-CM

## 2025-07-07 RX ORDER — MESALAMINE 0.38 G/1
1.5 CAPSULE, EXTENDED RELEASE ORAL DAILY
Qty: 360 CAPSULE | Refills: 1 | Status: SHIPPED | OUTPATIENT
Start: 2025-07-07 | End: 2026-01-03

## 2025-07-20 ENCOUNTER — PATIENT MESSAGE (OUTPATIENT)
Dept: GASTROENTEROLOGY | Facility: CLINIC | Age: 65
End: 2025-07-20
Payer: MEDICARE

## 2025-07-21 ENCOUNTER — PATIENT MESSAGE (OUTPATIENT)
Dept: ADMINISTRATIVE | Facility: HOSPITAL | Age: 65
End: 2025-07-21
Payer: MEDICARE

## 2025-08-04 ENCOUNTER — PATIENT MESSAGE (OUTPATIENT)
Dept: OBSTETRICS AND GYNECOLOGY | Facility: CLINIC | Age: 65
End: 2025-08-04
Payer: MEDICARE

## 2025-08-04 ENCOUNTER — PATIENT MESSAGE (OUTPATIENT)
Dept: RHEUMATOLOGY | Facility: CLINIC | Age: 65
End: 2025-08-04
Payer: MEDICARE

## 2025-08-04 DIAGNOSIS — M65.311 TRIGGER FINGER OF RIGHT THUMB: Primary | ICD-10-CM

## 2025-08-11 ENCOUNTER — TELEPHONE (OUTPATIENT)
Facility: CLINIC | Age: 65
End: 2025-08-11
Payer: MEDICARE

## 2025-08-18 ENCOUNTER — PATIENT MESSAGE (OUTPATIENT)
Dept: ADMINISTRATIVE | Facility: HOSPITAL | Age: 65
End: 2025-08-18
Payer: MEDICARE

## 2025-08-21 ENCOUNTER — PATIENT OUTREACH (OUTPATIENT)
Dept: ADMINISTRATIVE | Facility: HOSPITAL | Age: 65
End: 2025-08-21
Payer: MEDICARE

## 2025-08-21 DIAGNOSIS — M79.641 RIGHT HAND PAIN: ICD-10-CM

## 2025-08-21 DIAGNOSIS — Z12.31 ENCOUNTER FOR SCREENING MAMMOGRAM FOR MALIGNANT NEOPLASM OF BREAST: Primary | ICD-10-CM

## 2025-08-26 ENCOUNTER — HOSPITAL ENCOUNTER (OUTPATIENT)
Facility: HOSPITAL | Age: 65
Discharge: HOME | End: 2025-08-26
Attending: PHYSICIAN ASSISTANT
Payer: MEDICARE

## 2025-08-26 ENCOUNTER — OFFICE VISIT (OUTPATIENT)
Dept: ORTHOPEDICS | Facility: CLINIC | Age: 65
End: 2025-08-26
Payer: MEDICARE

## 2025-08-26 VITALS — BODY MASS INDEX: 34.65 KG/M2 | WEIGHT: 215.63 LBS | HEIGHT: 66 IN

## 2025-08-26 DIAGNOSIS — M79.641 RIGHT HAND PAIN: ICD-10-CM

## 2025-08-26 DIAGNOSIS — M65.331 TRIGGER FINGER, RIGHT MIDDLE FINGER: ICD-10-CM

## 2025-08-26 DIAGNOSIS — M65.311 TRIGGER FINGER OF RIGHT THUMB: Primary | ICD-10-CM

## 2025-08-26 DIAGNOSIS — G56.03 BILATERAL CARPAL TUNNEL SYNDROME: ICD-10-CM

## 2025-08-26 PROCEDURE — 99999 PR PBB SHADOW E&M-EST. PATIENT-LVL III: CPT | Mod: PBBFAC,,, | Performed by: PHYSICIAN ASSISTANT

## 2025-08-26 RX ORDER — TRIAMCINOLONE ACETONIDE 40 MG/ML
20 INJECTION, SUSPENSION INTRA-ARTICULAR; INTRAMUSCULAR
Status: DISCONTINUED | OUTPATIENT
Start: 2025-08-26 | End: 2025-08-26 | Stop reason: HOSPADM

## 2025-08-26 RX ADMIN — TRIAMCINOLONE ACETONIDE 20 MG: 40 INJECTION, SUSPENSION INTRA-ARTICULAR; INTRAMUSCULAR at 10:08

## 2025-09-03 ENCOUNTER — TELEPHONE (OUTPATIENT)
Dept: OBSTETRICS AND GYNECOLOGY | Facility: CLINIC | Age: 65
End: 2025-09-03
Payer: MEDICARE

## 2025-09-05 ENCOUNTER — OFFICE VISIT (OUTPATIENT)
Dept: OBSTETRICS AND GYNECOLOGY | Facility: CLINIC | Age: 65
End: 2025-09-05
Payer: MEDICARE

## 2025-09-05 VITALS
HEIGHT: 66 IN | DIASTOLIC BLOOD PRESSURE: 78 MMHG | SYSTOLIC BLOOD PRESSURE: 126 MMHG | WEIGHT: 216.06 LBS | BODY MASS INDEX: 34.72 KG/M2

## 2025-09-05 DIAGNOSIS — K51.80 OTHER ULCERATIVE COLITIS WITHOUT COMPLICATION: ICD-10-CM

## 2025-09-05 DIAGNOSIS — N76.6 VULVAR ULCER: ICD-10-CM

## 2025-09-05 DIAGNOSIS — N82.3 RECTOVAGINAL FISTULA: Primary | ICD-10-CM

## 2025-09-05 PROCEDURE — 99999 PR PBB SHADOW E&M-EST. PATIENT-LVL III: CPT | Mod: PBBFAC,,, | Performed by: OBSTETRICS & GYNECOLOGY

## (undated) DEVICE — OBTURATOR BLADELESS 8MM XI CLR

## (undated) DEVICE — TUBE SET SINGLE LUMEN FILTERED

## (undated) DEVICE — DRAPE ABDOMINAL TIBURON 14X11

## (undated) DEVICE — SEAL UNIVERSAL 5MM-8MM XI

## (undated) DEVICE — DEVICE SYNCHROSEAL DA VINCI

## (undated) DEVICE — ELECTRODE BLADE INSULATED 1 IN

## (undated) DEVICE — IRRIGATOR ENDOSCOPY DISP.

## (undated) DEVICE — ADHESIVE DERMABOND ADVANCED

## (undated) DEVICE — PAD PINK TRENDELENBURG POS XL

## (undated) DEVICE — SOL ELECTROLUBE ANTI-STIC

## (undated) DEVICE — SUT 3-0 VICRYL / SH (J416)

## (undated) DEVICE — STAPLER SUREFORM 60 SPU

## (undated) DEVICE — RELOAD SUREFORM 60 2.5 WHT 6R

## (undated) DEVICE — BLADE SURG CARBON STEEL SZ11

## (undated) DEVICE — SUT PROLENE 4-0 RB-1 BL MO

## (undated) DEVICE — CONTAINER SPECIMEN STRL 4OZ

## (undated) DEVICE — KIT PROCEDURE STER INLET CLOSU

## (undated) DEVICE — TRAY MINOR GEN SURG OMC

## (undated) DEVICE — TUBING SUC UNIV W/CONN 12FT

## (undated) DEVICE — DRAPE COLUMN DAVINCI XI

## (undated) DEVICE — ELECTRODE REM PLYHSV RETURN 9

## (undated) DEVICE — COVER TIP CURVED SCISSORS XI

## (undated) DEVICE — SUT PROLENE 3-0 SH DA 36 BL

## (undated) DEVICE — SUT VICRYL+ 27 UR-6 VIOL

## (undated) DEVICE — DRAPE STERI INSTRUMENT 1018

## (undated) DEVICE — COVER PROXIMA MAYO STAND

## (undated) DEVICE — DRAPE ARM DAVINCI XI

## (undated) DEVICE — SUT MONOCRYL 4-0 PS-2

## (undated) DEVICE — SYS SEE SHARP SCP ANTIFG LNG

## (undated) DEVICE — NDL INSUF ULTRA VERESS 120MM

## (undated) DEVICE — DRAPE SCOPE PILLOW WARMER

## (undated) DEVICE — TROCAR ENDOPATH XCEL 5X100MM